# Patient Record
Sex: MALE | Race: WHITE | NOT HISPANIC OR LATINO | Employment: OTHER | URBAN - METROPOLITAN AREA
[De-identification: names, ages, dates, MRNs, and addresses within clinical notes are randomized per-mention and may not be internally consistent; named-entity substitution may affect disease eponyms.]

---

## 2023-01-30 PROBLEM — M19.049 OSTEOARTHRITIS, HAND: Status: ACTIVE | Noted: 2023-01-30

## 2023-01-30 PROBLEM — M25.569 PAIN IN JOINT, LOWER LEG: Status: ACTIVE | Noted: 2023-01-30

## 2023-01-30 PROBLEM — M19.039 OSTEOARTHRITIS OF WRIST: Status: ACTIVE | Noted: 2023-01-30

## 2023-01-30 PROBLEM — E78.2 COMBINED HYPERLIPIDEMIA: Status: ACTIVE | Noted: 2023-01-30

## 2023-01-30 PROBLEM — H93.13 TINNITUS OF BOTH EARS: Status: ACTIVE | Noted: 2023-01-30

## 2023-01-30 PROBLEM — R73.01 IMPAIRED FASTING GLUCOSE: Status: ACTIVE | Noted: 2023-01-30

## 2023-01-30 PROBLEM — R61 HYPERHIDROSIS: Status: ACTIVE | Noted: 2023-01-30

## 2023-01-30 PROBLEM — M47.816 DEGENERATIVE ARTHRITIS OF LUMBAR SPINE: Status: ACTIVE | Noted: 2023-01-30

## 2023-01-30 PROBLEM — M47.812 SPONDYLOSIS OF CERVICAL REGION WITHOUT MYELOPATHY OR RADICULOPATHY: Status: ACTIVE | Noted: 2023-01-30

## 2023-01-30 PROBLEM — M54.12 CERVICAL RADICULITIS: Status: ACTIVE | Noted: 2023-01-30

## 2023-01-30 PROBLEM — G89.29 BILATERAL CHRONIC KNEE PAIN: Status: ACTIVE | Noted: 2023-01-30

## 2023-01-30 PROBLEM — M25.561 BILATERAL CHRONIC KNEE PAIN: Status: ACTIVE | Noted: 2023-01-30

## 2023-01-30 PROBLEM — E53.8 B12 DEFICIENCY: Status: ACTIVE | Noted: 2023-01-30

## 2023-01-30 PROBLEM — H90.3 SENSORINEURAL HEARING LOSS, BILATERAL: Status: ACTIVE | Noted: 2023-01-30

## 2023-01-30 PROBLEM — M48.02 CERVICAL STENOSIS OF SPINE: Status: ACTIVE | Noted: 2023-01-30

## 2023-01-30 PROBLEM — M25.512 LEFT SHOULDER PAIN: Status: ACTIVE | Noted: 2023-01-30

## 2023-01-30 PROBLEM — M51.36 DISC DEGENERATION, LUMBAR: Status: ACTIVE | Noted: 2023-01-30

## 2023-01-30 PROBLEM — Z96.21 COCHLEAR IMPLANT STATUS: Status: ACTIVE | Noted: 2023-01-30

## 2023-01-30 PROBLEM — M96.1 LUMBAR POSTLAMINECTOMY SYNDROME: Status: ACTIVE | Noted: 2023-01-30

## 2023-01-30 PROBLEM — M54.50 LOW BACK PAIN: Status: ACTIVE | Noted: 2023-01-30

## 2023-01-30 PROBLEM — R53.1 WEAKNESS: Status: ACTIVE | Noted: 2023-01-30

## 2023-01-30 PROBLEM — R07.81 CHEST PAIN, PLEURITIC: Status: ACTIVE | Noted: 2023-01-30

## 2023-01-30 PROBLEM — H91.90 HEARING LOSS: Status: ACTIVE | Noted: 2023-01-30

## 2023-01-30 PROBLEM — K21.9 GERD (GASTROESOPHAGEAL REFLUX DISEASE): Status: ACTIVE | Noted: 2023-01-30

## 2023-01-30 PROBLEM — M25.562 BILATERAL CHRONIC KNEE PAIN: Status: ACTIVE | Noted: 2023-01-30

## 2023-01-30 PROBLEM — M75.42 IMPINGEMENT SYNDROME OF LEFT SHOULDER: Status: ACTIVE | Noted: 2023-01-30

## 2023-01-30 PROBLEM — R91.8 LUNG MASS: Status: ACTIVE | Noted: 2023-01-30

## 2023-01-30 PROBLEM — M79.18 MYOFASCIAL PAIN SYNDROME, CERVICAL: Status: ACTIVE | Noted: 2023-01-30

## 2023-01-30 PROBLEM — Z98.1 STATUS POST LUMBAR SPINAL FUSION: Status: ACTIVE | Noted: 2023-01-30

## 2023-01-30 PROBLEM — M51.369 DISC DEGENERATION, LUMBAR: Status: ACTIVE | Noted: 2023-01-30

## 2023-01-30 RX ORDER — ACETAMINOPHEN 500 MG
1 TABLET ORAL
COMMUNITY

## 2023-01-30 RX ORDER — ALUMINUM CHLORIDE 20 %
SOLUTION, NON-ORAL TOPICAL
COMMUNITY
Start: 2022-09-13 | End: 2024-02-08 | Stop reason: HOSPADM

## 2023-01-30 RX ORDER — OMEPRAZOLE 20 MG/1
1 CAPSULE, DELAYED RELEASE ORAL DAILY
COMMUNITY
Start: 2018-05-24 | End: 2023-05-08

## 2023-01-30 RX ORDER — ROSUVASTATIN CALCIUM 5 MG/1
1 TABLET, COATED ORAL DAILY
COMMUNITY
Start: 2020-10-27 | End: 2024-01-25

## 2023-01-30 RX ORDER — MULTIVIT-MIN/FA/LYCOPEN/LUTEIN .4-300-25
TABLET ORAL
COMMUNITY
Start: 2020-08-05

## 2023-01-30 RX ORDER — MELOXICAM 15 MG/1
1 TABLET ORAL DAILY
COMMUNITY
Start: 2020-06-01 | End: 2023-09-18 | Stop reason: SDUPTHER

## 2023-03-10 RX ORDER — OMEPRAZOLE 20 MG/1
1 TABLET, DELAYED RELEASE ORAL DAILY
COMMUNITY
End: 2023-10-24 | Stop reason: ALTCHOICE

## 2023-03-14 ENCOUNTER — OFFICE VISIT (OUTPATIENT)
Dept: PRIMARY CARE | Facility: CLINIC | Age: 73
End: 2023-03-14
Payer: MEDICARE

## 2023-03-14 ENCOUNTER — LAB (OUTPATIENT)
Dept: LAB | Facility: LAB | Age: 73
End: 2023-03-14
Payer: MEDICARE

## 2023-03-14 VITALS
DIASTOLIC BLOOD PRESSURE: 60 MMHG | SYSTOLIC BLOOD PRESSURE: 130 MMHG | HEART RATE: 77 BPM | BODY MASS INDEX: 24.88 KG/M2 | HEIGHT: 69 IN | WEIGHT: 168 LBS | OXYGEN SATURATION: 95 %

## 2023-03-14 DIAGNOSIS — E78.2 COMBINED HYPERLIPIDEMIA: Primary | ICD-10-CM

## 2023-03-14 DIAGNOSIS — Z12.5 SCREENING FOR MALIGNANT NEOPLASM OF PROSTATE: ICD-10-CM

## 2023-03-14 DIAGNOSIS — R73.01 IMPAIRED FASTING GLUCOSE: ICD-10-CM

## 2023-03-14 DIAGNOSIS — E78.2 COMBINED HYPERLIPIDEMIA: ICD-10-CM

## 2023-03-14 DIAGNOSIS — Z00.00 HEALTH MAINTENANCE EXAMINATION: ICD-10-CM

## 2023-03-14 LAB
ALANINE AMINOTRANSFERASE (SGPT) (U/L) IN SER/PLAS: 30 U/L (ref 10–52)
ALBUMIN (G/DL) IN SER/PLAS: 4.6 G/DL (ref 3.4–5)
ALKALINE PHOSPHATASE (U/L) IN SER/PLAS: 60 U/L (ref 33–136)
ANION GAP IN SER/PLAS: 13 MMOL/L (ref 10–20)
ASPARTATE AMINOTRANSFERASE (SGOT) (U/L) IN SER/PLAS: 24 U/L (ref 9–39)
BASOPHILS (10*3/UL) IN BLOOD BY AUTOMATED COUNT: 0.11 X10E9/L (ref 0–0.1)
BASOPHILS/100 LEUKOCYTES IN BLOOD BY AUTOMATED COUNT: 1.4 % (ref 0–2)
BILIRUBIN TOTAL (MG/DL) IN SER/PLAS: 0.9 MG/DL (ref 0–1.2)
CALCIUM (MG/DL) IN SER/PLAS: 9.5 MG/DL (ref 8.6–10.3)
CARBON DIOXIDE, TOTAL (MMOL/L) IN SER/PLAS: 29 MMOL/L (ref 21–32)
CHLORIDE (MMOL/L) IN SER/PLAS: 103 MMOL/L (ref 98–107)
CHOLESTEROL (MG/DL) IN SER/PLAS: 213 MG/DL (ref 0–199)
CHOLESTEROL IN HDL (MG/DL) IN SER/PLAS: 38.8 MG/DL
CHOLESTEROL/HDL RATIO: 5.5
CREATININE (MG/DL) IN SER/PLAS: 1.02 MG/DL (ref 0.5–1.3)
EOSINOPHILS (10*3/UL) IN BLOOD BY AUTOMATED COUNT: 0.49 X10E9/L (ref 0–0.4)
EOSINOPHILS/100 LEUKOCYTES IN BLOOD BY AUTOMATED COUNT: 6.1 % (ref 0–6)
ERYTHROCYTE DISTRIBUTION WIDTH (RATIO) BY AUTOMATED COUNT: 13.2 % (ref 11.5–14.5)
ERYTHROCYTE MEAN CORPUSCULAR HEMOGLOBIN CONCENTRATION (G/DL) BY AUTOMATED: 32.9 G/DL (ref 32–36)
ERYTHROCYTE MEAN CORPUSCULAR VOLUME (FL) BY AUTOMATED COUNT: 98 FL (ref 80–100)
ERYTHROCYTES (10*6/UL) IN BLOOD BY AUTOMATED COUNT: 5.05 X10E12/L (ref 4.5–5.9)
GFR MALE: 78 ML/MIN/1.73M2
GLUCOSE (MG/DL) IN SER/PLAS: 85 MG/DL (ref 74–99)
HEMATOCRIT (%) IN BLOOD BY AUTOMATED COUNT: 49.5 % (ref 41–52)
HEMOGLOBIN (G/DL) IN BLOOD: 16.3 G/DL (ref 13.5–17.5)
IMMATURE GRANULOCYTES/100 LEUKOCYTES IN BLOOD BY AUTOMATED COUNT: 0.4 % (ref 0–0.9)
LDL: 141 MG/DL (ref 0–99)
LEUKOCYTES (10*3/UL) IN BLOOD BY AUTOMATED COUNT: 8.1 X10E9/L (ref 4.4–11.3)
LYMPHOCYTES (10*3/UL) IN BLOOD BY AUTOMATED COUNT: 2.55 X10E9/L (ref 0.8–3)
LYMPHOCYTES/100 LEUKOCYTES IN BLOOD BY AUTOMATED COUNT: 31.6 % (ref 13–44)
MONOCYTES (10*3/UL) IN BLOOD BY AUTOMATED COUNT: 0.81 X10E9/L (ref 0.05–0.8)
MONOCYTES/100 LEUKOCYTES IN BLOOD BY AUTOMATED COUNT: 10 % (ref 2–10)
NEUTROPHILS (10*3/UL) IN BLOOD BY AUTOMATED COUNT: 4.08 X10E9/L (ref 1.6–5.5)
NEUTROPHILS/100 LEUKOCYTES IN BLOOD BY AUTOMATED COUNT: 50.5 % (ref 40–80)
PLATELETS (10*3/UL) IN BLOOD AUTOMATED COUNT: 337 X10E9/L (ref 150–450)
POTASSIUM (MMOL/L) IN SER/PLAS: 4.6 MMOL/L (ref 3.5–5.3)
PROSTATE SPECIFIC AG (NG/ML) IN SER/PLAS: 1.2 NG/ML (ref 0–4)
PROTEIN TOTAL: 7 G/DL (ref 6.4–8.2)
SODIUM (MMOL/L) IN SER/PLAS: 140 MMOL/L (ref 136–145)
TRIGLYCERIDE (MG/DL) IN SER/PLAS: 165 MG/DL (ref 0–149)
UREA NITROGEN (MG/DL) IN SER/PLAS: 23 MG/DL (ref 6–23)
VLDL: 33 MG/DL (ref 0–40)

## 2023-03-14 PROCEDURE — 80053 COMPREHEN METABOLIC PANEL: CPT

## 2023-03-14 PROCEDURE — 1160F RVW MEDS BY RX/DR IN RCRD: CPT | Performed by: FAMILY MEDICINE

## 2023-03-14 PROCEDURE — G0439 PPPS, SUBSEQ VISIT: HCPCS | Performed by: FAMILY MEDICINE

## 2023-03-14 PROCEDURE — 99214 OFFICE O/P EST MOD 30 MIN: CPT | Performed by: FAMILY MEDICINE

## 2023-03-14 PROCEDURE — 80061 LIPID PANEL: CPT

## 2023-03-14 PROCEDURE — 85025 COMPLETE CBC W/AUTO DIFF WBC: CPT

## 2023-03-14 PROCEDURE — 1159F MED LIST DOCD IN RCRD: CPT | Performed by: FAMILY MEDICINE

## 2023-03-14 PROCEDURE — 1170F FXNL STATUS ASSESSED: CPT | Performed by: FAMILY MEDICINE

## 2023-03-14 PROCEDURE — 1157F ADVNC CARE PLAN IN RCRD: CPT | Performed by: FAMILY MEDICINE

## 2023-03-14 PROCEDURE — 36415 COLL VENOUS BLD VENIPUNCTURE: CPT

## 2023-03-14 PROCEDURE — 1036F TOBACCO NON-USER: CPT | Performed by: FAMILY MEDICINE

## 2023-03-14 ASSESSMENT — ACTIVITIES OF DAILY LIVING (ADL)
BATHING: INDEPENDENT
DRESSING: INDEPENDENT
TAKING_MEDICATION: INDEPENDENT
MANAGING_FINANCES: INDEPENDENT
DOING_HOUSEWORK: INDEPENDENT
GROCERY_SHOPPING: INDEPENDENT

## 2023-03-14 ASSESSMENT — ENCOUNTER SYMPTOMS
UNEXPECTED WEIGHT CHANGE: 0
PALPITATIONS: 0
CONSTIPATION: 0
VOMITING: 0

## 2023-03-14 ASSESSMENT — PATIENT HEALTH QUESTIONNAIRE - PHQ9
SUM OF ALL RESPONSES TO PHQ9 QUESTIONS 1 AND 2: 0
1. LITTLE INTEREST OR PLEASURE IN DOING THINGS: NOT AT ALL
2. FEELING DOWN, DEPRESSED OR HOPELESS: NOT AT ALL

## 2023-03-14 NOTE — PROGRESS NOTES
"Subjective   Patient ID: Klever Moran is a 72 y.o. male who presents for Medicare Annual Wellness Visit Subsequent (Denies any concerns).    HPI   For follow-up of hyperlipidemia, able  Review of Systems   Constitutional:  Negative for unexpected weight change.   HENT:  Negative for congestion and ear discharge.    Cardiovascular:  Negative for chest pain and palpitations.   Gastrointestinal:  Negative for constipation and vomiting.   All other systems reviewed and are negative.  Last colonoscopy 2018, due again 2025, Dr Montana    Objective   /84   Pulse 77   Ht 1.746 m (5' 8.75\")   Wt 76.2 kg (168 lb)   SpO2 95%   BMI 24.99 kg/m²     Physical Exam  HENT:      Head: Normocephalic and atraumatic.      Nose: Nose normal.      Mouth/Throat:      Mouth: Mucous membranes are moist.      Pharynx: No oropharyngeal exudate.   Eyes:      Extraocular Movements: Extraocular movements intact.      Conjunctiva/sclera: Conjunctivae normal.      Pupils: Pupils are equal, round, and reactive to light.   Cardiovascular:      Rate and Rhythm: Normal rate and regular rhythm.   Pulmonary:      Effort: Pulmonary effort is normal.   Abdominal:      General: There is no distension.      Palpations: Abdomen is soft.   Musculoskeletal:      Cervical back: Normal range of motion and neck supple.   Lymphadenopathy:      Cervical: No cervical adenopathy.   Neurological:      General: No focal deficit present.      Mental Status: He is alert.   Psychiatric:         Attention and Perception: Attention normal.         Speech: Speech normal.         Behavior: Behavior is cooperative.         Assessment/Plan   Diagnoses and all orders for this visit:  Combined hyperlipidemia  -     Lipid Panel; Future  -     CBC and Auto Differential; Future  -     Comprehensive Metabolic Panel; Future  Impaired fasting glucose  Health maintenance examination  Screening for malignant neoplasm of prostate    PSA ordered, written, unable to put in " computer due to a glitch system

## 2023-03-15 ENCOUNTER — TELEPHONE (OUTPATIENT)
Dept: PRIMARY CARE | Facility: CLINIC | Age: 73
End: 2023-03-15
Payer: MEDICARE

## 2023-03-15 NOTE — TELEPHONE ENCOUNTER
Result Communication    Resulted Orders   Prostate Specific Antigen   Result Value Ref Range    PSA 1.20 0.00 - 4.00 ng/mL      Comment:      The FDA requires that the method used for PSA assay be   reported to the physician. Values obtained with different   assay methods must not be used interchangeably. This test   was performed at [facility] using the Siemens  Innometrix IncllA.P Avanashiappa Silk PSA method, which is a sandwich immunoassay using   chemiluminescence for quantitation. The assay is approved  for measurement of prostate-specific antigen (PSA) in   serum and may be used in conjunction with a digital rectal  examination in men 50 years and older as an aid in   detection of prostate cancer.   5-Alpha-reductase inhibitors (e.g. Proscar, Finasteride,   Avodart, Dutasteride and Brandy) for the treatment of BPH   have been shown to lower PSA levels by an average of 50%   after 6 months of treatment.         5:18 PM      Results were successfully communicated with the pts spouse and they acknowledged their understanding.

## 2023-03-16 ENCOUNTER — TELEPHONE (OUTPATIENT)
Dept: PRIMARY CARE | Facility: CLINIC | Age: 73
End: 2023-03-16
Payer: MEDICARE

## 2023-03-16 DIAGNOSIS — Z12.11 ENCOUNTER FOR SCREENING COLONOSCOPY: Primary | ICD-10-CM

## 2023-03-16 NOTE — PROGRESS NOTES
Pt. Called Dr. Low office to arrange a colonoscopy and they asked him to get verification from us. Reviewed with JU and will place a referral to Dr. Montana for colonoscopy.   Pt. Notified and will call her office tomorrow to arrange.

## 2023-03-16 NOTE — TELEPHONE ENCOUNTER
----- Message from Howard Wright MD sent at 3/14/2023  2:56 PM EDT -----  Labs look good except that cholesterol and triglycerides are up quite a bit from last time  Recommend low-fat low-cholesterol diet and stay active

## 2023-04-21 ENCOUNTER — HOSPITAL ENCOUNTER (OUTPATIENT)
Dept: DATA CONVERSION | Facility: HOSPITAL | Age: 73
End: 2023-04-21
Attending: SURGERY | Admitting: SURGERY
Payer: MEDICARE

## 2023-04-21 DIAGNOSIS — K21.9 GASTRO-ESOPHAGEAL REFLUX DISEASE WITHOUT ESOPHAGITIS: ICD-10-CM

## 2023-04-21 DIAGNOSIS — K63.3 ULCER OF INTESTINE: ICD-10-CM

## 2023-04-21 DIAGNOSIS — I10 ESSENTIAL (PRIMARY) HYPERTENSION: ICD-10-CM

## 2023-04-21 DIAGNOSIS — Z86.010 PERSONAL HISTORY OF COLONIC POLYPS: ICD-10-CM

## 2023-04-21 DIAGNOSIS — Z12.11 ENCOUNTER FOR SCREENING FOR MALIGNANT NEOPLASM OF COLON: ICD-10-CM

## 2023-05-03 LAB
COMPLETE PATHOLOGY REPORT: NORMAL
CONVERTED CLINICAL DIAGNOSIS-HISTORY: NORMAL
CONVERTED FINAL DIAGNOSIS: NORMAL
CONVERTED FINAL REPORT PDF LINK TO COPY AND PASTE: NORMAL
CONVERTED GROSS DESCRIPTION: NORMAL

## 2023-05-07 DIAGNOSIS — K21.9 GASTRO-ESOPHAGEAL REFLUX DISEASE WITHOUT ESOPHAGITIS: ICD-10-CM

## 2023-05-08 PROBLEM — Z86.0100 HISTORY OF COLON POLYPS: Status: ACTIVE | Noted: 2023-05-08

## 2023-05-08 PROBLEM — Z86.010 HISTORY OF COLON POLYPS: Status: ACTIVE | Noted: 2023-05-08

## 2023-05-08 RX ORDER — DIPHENHYDRAMINE HCL 25 MG
1 TABLET ORAL EVERY 4 HOURS PRN
COMMUNITY
End: 2023-10-24 | Stop reason: ALTCHOICE

## 2023-05-08 RX ORDER — OMEPRAZOLE 20 MG/1
CAPSULE, DELAYED RELEASE ORAL
Qty: 90 CAPSULE | Refills: 3 | Status: SHIPPED | OUTPATIENT
Start: 2023-05-08 | End: 2024-05-07

## 2023-05-08 RX ORDER — ACETAMINOPHEN AND CODEINE PHOSPHATE 300; 30 MG/1; MG/1
1 TABLET ORAL
COMMUNITY
Start: 2023-04-12 | End: 2023-10-24 | Stop reason: ALTCHOICE

## 2023-09-14 NOTE — H&P
History & Physical Reviewed:   I have reviewed the History and Physical dated:  13-Apr-2023   History and Physical reviewed and relevant findings noted. Patient examined to review pertinent physical  findings.: No significant changes   Home Medications Reviewed: no changes noted   Allergies Reviewed: no changes noted       ERAS (Enhanced Recovery After Surgery):  ·  ERAS Patient: no     Consent:   COVID-19 Consent:  ·  COVID-19 Risk Consent Surgeon has reviewed key risks related to the risk of luis fernando COVID-19 and if they contract COVID-19 what the risks are.       Electronic Signatures:  Gloria Montana)  (Signed 21-Apr-2023 10:02)   Authored: History & Physical Reviewed, ERAS, Consent,  Note Completion      Last Updated: 21-Apr-2023 10:02 by Gloria Montana)

## 2023-09-18 DIAGNOSIS — M25.569 ARTHRALGIA OF LOWER LEG, UNSPECIFIED LATERALITY: Primary | ICD-10-CM

## 2023-09-18 NOTE — TELEPHONE ENCOUNTER
MEDICATION REFILL    Pharmacy Name:  [facility]/   Medication requested  Meloxicam 15 mg  Dosage  1x daily  Quantity  90 days    Allergies     none given  Date of last visit    03/2023  Date of Next Appointment   10/16/2023    Pt is completely out of Rx.

## 2023-09-19 RX ORDER — MELOXICAM 15 MG/1
15 TABLET ORAL DAILY
Qty: 30 TABLET | Refills: 0 | Status: SHIPPED | OUTPATIENT
Start: 2023-09-19 | End: 2023-10-19

## 2023-09-21 ENCOUNTER — TELEPHONE (OUTPATIENT)
Dept: PRIMARY CARE | Facility: CLINIC | Age: 73
End: 2023-09-21
Payer: MEDICARE

## 2023-09-21 NOTE — TELEPHONE ENCOUNTER
Rx Refill Request Telephone Encounter      Pharmacy: [facility] [place]  Rx: Maloxican  Dose: 15mg once daily  Quantity: 90  Allergies: NKA  Last Office Visit: March 2023  Next Office Visit:   October 2023

## 2023-10-16 ENCOUNTER — APPOINTMENT (OUTPATIENT)
Dept: PRIMARY CARE | Facility: CLINIC | Age: 73
End: 2023-10-16
Payer: MEDICARE

## 2023-10-19 DIAGNOSIS — M25.569 ARTHRALGIA OF LOWER LEG, UNSPECIFIED LATERALITY: ICD-10-CM

## 2023-10-19 PROBLEM — C44.329 SQUAMOUS CELL CARCINOMA OF SKIN OF OTHER PARTS OF FACE: Status: ACTIVE | Noted: 2021-09-21

## 2023-10-19 PROBLEM — M19.012 PRIMARY OSTEOARTHRITIS, LEFT SHOULDER: Status: ACTIVE | Noted: 2023-10-19

## 2023-10-19 PROBLEM — C44.42 SQUAMOUS CELL CARCINOMA OF SKIN OF SCALP AND NECK: Status: ACTIVE | Noted: 2021-09-21

## 2023-10-19 RX ORDER — MELOXICAM 15 MG/1
15 TABLET ORAL DAILY
Qty: 30 TABLET | Refills: 0 | Status: SHIPPED | OUTPATIENT
Start: 2023-10-19 | End: 2023-10-24 | Stop reason: SDUPTHER

## 2023-10-24 ENCOUNTER — LAB (OUTPATIENT)
Dept: LAB | Facility: LAB | Age: 73
End: 2023-10-24
Payer: MEDICARE

## 2023-10-24 ENCOUNTER — OFFICE VISIT (OUTPATIENT)
Dept: PRIMARY CARE | Facility: CLINIC | Age: 73
End: 2023-10-24
Payer: MEDICARE

## 2023-10-24 VITALS
SYSTOLIC BLOOD PRESSURE: 130 MMHG | HEART RATE: 76 BPM | BODY MASS INDEX: 21.95 KG/M2 | DIASTOLIC BLOOD PRESSURE: 65 MMHG | OXYGEN SATURATION: 98 % | WEIGHT: 153 LBS

## 2023-10-24 DIAGNOSIS — M25.569 ARTHRALGIA OF LOWER LEG, UNSPECIFIED LATERALITY: ICD-10-CM

## 2023-10-24 DIAGNOSIS — Z23 NEED FOR PROPHYLACTIC VACCINATION AND INOCULATION AGAINST INFLUENZA: ICD-10-CM

## 2023-10-24 DIAGNOSIS — K21.9 GASTROESOPHAGEAL REFLUX DISEASE, UNSPECIFIED WHETHER ESOPHAGITIS PRESENT: ICD-10-CM

## 2023-10-24 DIAGNOSIS — E78.2 COMBINED HYPERLIPIDEMIA: ICD-10-CM

## 2023-10-24 DIAGNOSIS — E78.2 COMBINED HYPERLIPIDEMIA: Primary | ICD-10-CM

## 2023-10-24 LAB
ALBUMIN SERPL BCP-MCNC: 4.3 G/DL (ref 3.4–5)
ALP SERPL-CCNC: 61 U/L (ref 33–136)
ALT SERPL W P-5'-P-CCNC: 19 U/L (ref 10–52)
ANION GAP SERPL CALC-SCNC: 13 MMOL/L (ref 10–20)
AST SERPL W P-5'-P-CCNC: 20 U/L (ref 9–39)
BASOPHILS # BLD AUTO: 0.11 X10*3/UL (ref 0–0.1)
BASOPHILS NFR BLD AUTO: 1.8 %
BILIRUB SERPL-MCNC: 0.8 MG/DL (ref 0–1.2)
BUN SERPL-MCNC: 22 MG/DL (ref 6–23)
CALCIUM SERPL-MCNC: 8.8 MG/DL (ref 8.6–10.3)
CHLORIDE SERPL-SCNC: 105 MMOL/L (ref 98–107)
CHOLEST SERPL-MCNC: 164 MG/DL (ref 0–199)
CHOLESTEROL/HDL RATIO: 4.1
CO2 SERPL-SCNC: 27 MMOL/L (ref 21–32)
CREAT SERPL-MCNC: 0.9 MG/DL (ref 0.5–1.3)
EOSINOPHIL # BLD AUTO: 0.36 X10*3/UL (ref 0–0.4)
EOSINOPHIL NFR BLD AUTO: 5.9 %
ERYTHROCYTE [DISTWIDTH] IN BLOOD BY AUTOMATED COUNT: 13.3 % (ref 11.5–14.5)
GFR SERPL CREATININE-BSD FRML MDRD: 90 ML/MIN/1.73M*2
GLUCOSE SERPL-MCNC: 84 MG/DL (ref 74–99)
HCT VFR BLD AUTO: 46.3 % (ref 41–52)
HDLC SERPL-MCNC: 40.3 MG/DL
HGB BLD-MCNC: 14.8 G/DL (ref 13.5–17.5)
IMM GRANULOCYTES # BLD AUTO: 0.01 X10*3/UL (ref 0–0.5)
IMM GRANULOCYTES NFR BLD AUTO: 0.2 % (ref 0–0.9)
LDLC SERPL CALC-MCNC: 107 MG/DL
LYMPHOCYTES # BLD AUTO: 1.63 X10*3/UL (ref 0.8–3)
LYMPHOCYTES NFR BLD AUTO: 26.9 %
MCH RBC QN AUTO: 32.4 PG (ref 26–34)
MCHC RBC AUTO-ENTMCNC: 32 G/DL (ref 32–36)
MCV RBC AUTO: 101 FL (ref 80–100)
MONOCYTES # BLD AUTO: 0.47 X10*3/UL (ref 0.05–0.8)
MONOCYTES NFR BLD AUTO: 7.8 %
NEUTROPHILS # BLD AUTO: 3.48 X10*3/UL (ref 1.6–5.5)
NEUTROPHILS NFR BLD AUTO: 57.4 %
NON HDL CHOLESTEROL: 124 MG/DL (ref 0–149)
NRBC BLD-RTO: 0 /100 WBCS (ref 0–0)
PLATELET # BLD AUTO: 268 X10*3/UL (ref 150–450)
PMV BLD AUTO: 10.9 FL (ref 7.5–11.5)
POTASSIUM SERPL-SCNC: 4.9 MMOL/L (ref 3.5–5.3)
PROT SERPL-MCNC: 6.6 G/DL (ref 6.4–8.2)
RBC # BLD AUTO: 4.57 X10*6/UL (ref 4.5–5.9)
SODIUM SERPL-SCNC: 140 MMOL/L (ref 136–145)
TRIGL SERPL-MCNC: 86 MG/DL (ref 0–149)
VLDL: 17 MG/DL (ref 0–40)
WBC # BLD AUTO: 6.1 X10*3/UL (ref 4.4–11.3)

## 2023-10-24 PROCEDURE — 1160F RVW MEDS BY RX/DR IN RCRD: CPT | Performed by: FAMILY MEDICINE

## 2023-10-24 PROCEDURE — 80061 LIPID PANEL: CPT

## 2023-10-24 PROCEDURE — 1036F TOBACCO NON-USER: CPT | Performed by: FAMILY MEDICINE

## 2023-10-24 PROCEDURE — 1125F AMNT PAIN NOTED PAIN PRSNT: CPT | Performed by: FAMILY MEDICINE

## 2023-10-24 PROCEDURE — 1159F MED LIST DOCD IN RCRD: CPT | Performed by: FAMILY MEDICINE

## 2023-10-24 PROCEDURE — 36415 COLL VENOUS BLD VENIPUNCTURE: CPT

## 2023-10-24 PROCEDURE — G0008 ADMIN INFLUENZA VIRUS VAC: HCPCS | Performed by: FAMILY MEDICINE

## 2023-10-24 PROCEDURE — 80053 COMPREHEN METABOLIC PANEL: CPT

## 2023-10-24 PROCEDURE — 85025 COMPLETE CBC W/AUTO DIFF WBC: CPT

## 2023-10-24 PROCEDURE — 90662 IIV NO PRSV INCREASED AG IM: CPT | Performed by: FAMILY MEDICINE

## 2023-10-24 PROCEDURE — 99214 OFFICE O/P EST MOD 30 MIN: CPT | Performed by: FAMILY MEDICINE

## 2023-10-24 RX ORDER — MELOXICAM 15 MG/1
15 TABLET ORAL DAILY
Qty: 90 TABLET | Refills: 1 | Status: SHIPPED | OUTPATIENT
Start: 2023-10-24 | End: 2024-04-21

## 2023-10-24 ASSESSMENT — ENCOUNTER SYMPTOMS
CONSTIPATION: 0
VOMITING: 0
UNEXPECTED WEIGHT CHANGE: 0
PALPITATIONS: 0

## 2023-10-24 NOTE — PROGRESS NOTES
Doing well except for a lot of body aches all the time. Has been getting dizzy on standing doesn't last very long maybe a couple sec. Started last spring  Subjective   Patient ID: Klever Moran is a 73 y.o. male who presents for Follow-up.    HPI   Patient has hx of stable hypertension, hyperlipidemia.  Pt denies chest pain, shortness of breath and edema.  Patient's current treatment as listed in Rx.  Patient is compliant with treatment and complains of no side effects associated treatment.    Review of Systems   Constitutional:  Negative for unexpected weight change.   HENT:  Negative for congestion and ear discharge.    Cardiovascular:  Negative for chest pain and palpitations.   Gastrointestinal:  Negative for constipation and vomiting.   All other systems reviewed and are negative.      Objective   /65   Pulse 76   Wt 69.4 kg (153 lb)   SpO2 98%   BMI 21.95 kg/m²     Physical Exam  HENT:      Head: Normocephalic and atraumatic.      Nose: Nose normal.      Mouth/Throat:      Mouth: Mucous membranes are moist.      Pharynx: No oropharyngeal exudate.   Eyes:      Extraocular Movements: Extraocular movements intact.      Conjunctiva/sclera: Conjunctivae normal.      Pupils: Pupils are equal, round, and reactive to light.   Cardiovascular:      Rate and Rhythm: Normal rate and regular rhythm.   Pulmonary:      Effort: Pulmonary effort is normal.   Abdominal:      General: There is no distension.      Palpations: Abdomen is soft.   Musculoskeletal:      Cervical back: Normal range of motion and neck supple.   Lymphadenopathy:      Cervical: No cervical adenopathy.   Neurological:      General: No focal deficit present.      Mental Status: He is alert.   Psychiatric:         Attention and Perception: Attention normal.         Speech: Speech normal.         Behavior: Behavior is cooperative.         Assessment/Plan   Diagnoses and all orders for this visit:  Combined hyperlipidemia  Comments:  Continue  statin  Reviewed labs and recommend increasing to 10 or 20 mg daily but patient defers  Diet exercise weight discussed  Orders:  -     Lipid Panel; Future  -     CBC and Auto Differential; Future  -     Comprehensive Metabolic Panel; Future  Need for prophylactic vaccination and inoculation against influenza  -     Flu vaccine, quadrivalent, high-dose, preservative free, age 65y+ (FLUZONE)  -     Lipid Panel; Future  -     CBC and Auto Differential; Future  -     Comprehensive Metabolic Panel; Future  Gastroesophageal reflux disease, unspecified whether esophagitis present  Comments:  Controlled  Orders:  -     Lipid Panel; Future  -     CBC and Auto Differential; Future  -     Comprehensive Metabolic Panel; Future  Arthralgia of lower leg, unspecified laterality  Comments:  Lifestyle modification discussed  Orders:  -     meloxicam (Mobic) 15 mg tablet; Take 1 tablet (15 mg) by mouth once daily.  HM LM discussed  Check labs  Recheck 6 months sooner if any issues arise

## 2023-12-19 ENCOUNTER — TELEPHONE (OUTPATIENT)
Dept: PRIMARY CARE | Facility: CLINIC | Age: 73
End: 2023-12-19

## 2023-12-19 ENCOUNTER — OFFICE VISIT (OUTPATIENT)
Dept: ORTHOPEDIC SURGERY | Facility: CLINIC | Age: 73
End: 2023-12-19
Payer: MEDICARE

## 2023-12-19 DIAGNOSIS — Z01.818 PREOP EXAMINATION: ICD-10-CM

## 2023-12-19 DIAGNOSIS — M19.012 OSTEOARTHRITIS OF LEFT SHOULDER, UNSPECIFIED OSTEOARTHRITIS TYPE: Primary | ICD-10-CM

## 2023-12-19 PROCEDURE — 1125F AMNT PAIN NOTED PAIN PRSNT: CPT | Performed by: ORTHOPAEDIC SURGERY

## 2023-12-19 PROCEDURE — 1159F MED LIST DOCD IN RCRD: CPT | Performed by: ORTHOPAEDIC SURGERY

## 2023-12-19 PROCEDURE — 20611 DRAIN/INJ JOINT/BURSA W/US: CPT | Performed by: ORTHOPAEDIC SURGERY

## 2023-12-19 PROCEDURE — 1160F RVW MEDS BY RX/DR IN RCRD: CPT | Performed by: ORTHOPAEDIC SURGERY

## 2023-12-19 PROCEDURE — 1036F TOBACCO NON-USER: CPT | Performed by: ORTHOPAEDIC SURGERY

## 2023-12-19 PROCEDURE — 99213 OFFICE O/P EST LOW 20 MIN: CPT | Performed by: ORTHOPAEDIC SURGERY

## 2023-12-19 PROCEDURE — 2500000004 HC RX 250 GENERAL PHARMACY W/ HCPCS (ALT 636 FOR OP/ED): Performed by: ORTHOPAEDIC SURGERY

## 2023-12-19 PROCEDURE — 2500000005 HC RX 250 GENERAL PHARMACY W/O HCPCS: Performed by: ORTHOPAEDIC SURGERY

## 2023-12-19 RX ORDER — SODIUM CHLORIDE, SODIUM LACTATE, POTASSIUM CHLORIDE, CALCIUM CHLORIDE 600; 310; 30; 20 MG/100ML; MG/100ML; MG/100ML; MG/100ML
100 INJECTION, SOLUTION INTRAVENOUS CONTINUOUS
Status: CANCELLED | OUTPATIENT
Start: 2023-12-19

## 2023-12-19 RX ORDER — LIDOCAINE HYDROCHLORIDE 10 MG/ML
0.5 INJECTION INFILTRATION; PERINEURAL
Status: COMPLETED | OUTPATIENT
Start: 2023-12-19 | End: 2023-12-19

## 2023-12-19 RX ADMIN — TRIAMCINOLONE ACETONIDE 40 MG: 10 INJECTION, SUSPENSION INTRA-ARTICULAR; INTRALESIONAL at 10:09

## 2023-12-19 RX ADMIN — LIDOCAINE HYDROCHLORIDE 0.5 ML: 10 INJECTION, SOLUTION INFILTRATION; PERINEURAL at 10:09

## 2023-12-19 ASSESSMENT — ENCOUNTER SYMPTOMS
FATIGUE: 0
CHILLS: 0
WOUND: 0
FEVER: 0
WHEEZING: 0
TROUBLE SWALLOWING: 0
SHORTNESS OF BREATH: 0

## 2023-12-19 ASSESSMENT — PAIN SCALES - GENERAL: PAINLEVEL_OUTOF10: 7

## 2023-12-19 ASSESSMENT — PAIN - FUNCTIONAL ASSESSMENT: PAIN_FUNCTIONAL_ASSESSMENT: 0-10

## 2023-12-19 NOTE — PROGRESS NOTES
Reason for Appointment  Chief Complaint   Patient presents with    Right Shoulder - Injections    Left Shoulder - Pain     History of Present Illness  Patient is a 73 y.o. male here today for follow-up evaluation of continued left shoulder pain.  He has severe left shoulder rotator cuff arthritis.  He has had multiple injections in the past that have only given him short-term relief.  He is still very active and is having significant difficulties doing even simple daily activities due to pain and dysfunction in the shoulder.  His right shoulder is having more issues as well as he is having to use the right shoulder more.  Other changes in his past medical history, allergies, or medications.    Past Medical History:   Diagnosis Date    Encounter for screening for cardiovascular disorders 10/08/2019    Screening for abdominal aortic aneurysm    Encounter for screening for cardiovascular disorders 02/02/2021    Screening, heart disease, ischemic    Injury of conjunctiva and corneal abrasion without foreign body, left eye, initial encounter 04/16/2021    Abrasion of left cornea, initial encounter    Occipital neuralgia 09/12/2018    Occipital neuralgia of right side    Other chest pain 04/16/2021    Chest pain, atypical    Other conditions influencing health status     Adverse Reaction To Anesthesia    Other malaise 04/16/2021    Malaise and fatigue    Personal history of other diseases of the musculoskeletal system and connective tissue 04/16/2021    History of neck pain    Personal history of other diseases of the musculoskeletal system and connective tissue 06/01/2020    History of stiff neck    Personal history of other diseases of the nervous system and sense organs     History of hearing loss    Personal history of other specified conditions 06/05/2017    History of paresthesia    Personal history of other specified conditions 06/24/2020    History of weakness    Radiculopathy, lumbar region 09/10/2020    Lumbar  radiculitis    Superficial foreign body of unspecified hand, initial encounter 04/16/2021    Acute foreign body of hand    Unspecified hearing loss, right ear 04/16/2021    Hearing loss of right ear, unspecified hearing loss type       Past Surgical History:   Procedure Laterality Date    COCHLEAR IMPLANT  11/11/2022    HAND SURGERY  07/08/2013    Hand Surgery                                                                                                                                                          HERNIA REPAIR  06/25/2013    Hernia Repair    KNEE SURGERY  06/25/2013    Knee Surgery Right       Medication Documentation Review Audit       Reviewed by Kathy Jamison PA-C (Physician Assistant) on 12/19/23 at 1000      Medication Order Taking? Sig Documenting Provider Last Dose Status   acetaminophen (Tylenol) 500 mg tablet [id number] Yes Take 1 tablet (500 mg) by mouth. Repeat every 4 to 6 hours as needed Historical Provider, MD Taking Active   aluminum chloride (Drysol Dab-O-Matic) 20 % external solution [id number] Yes APPLY TO AFFECTED AREA DAILY AS NEEDED TO CONTROL SWEATING. Historical Provider, MD Taking Active   meloxicam (Mobic) 15 mg tablet [id number] Yes Take 1 tablet (15 mg) by mouth once daily. Howard Wright MD Taking Active   multivitamin-iron-minerals-folic acid (Centrum Silver) 0.4 mg-300 mcg- 250 mcg tablet [id number] Yes Centrum Silver Oral Tablet   Refills: 0        Start : 5-Aug-2020   Active Historical Provider, MD Taking Active   omeprazole (PriLOSEC) 20 mg DR capsule [id number] Yes TAKE 1 CAPSULE BY MOUTH EVERY DAY Howard Wright MD Taking Active   rosuvastatin (Crestor) 5 mg tablet [id number] Yes Take 1 tablet (5 mg) by mouth once daily. Historical Provider, MD Taking Active                    No Known Allergies    Review of Systems   Constitutional:  Negative for chills, fatigue and fever.   HENT:  Negative for postnasal drip and trouble swallowing.    Respiratory:  Negative for shortness  of breath and wheezing.    Cardiovascular:  Negative for chest pain and leg swelling.   Skin:  Negative for rash and wound.     Exam   On exam the left shoulder shows 100 degrees of active forward flexion, he has 140 degrees of active forward flexion on the right shoulder.  He has severe weakness with resisted external rotation on the left side, not as much  Assessment   Encounter Diagnoses   Name Primary?    Osteoarthritis of left shoulder, unspecified osteoarthritis type Yes    Preop examination        Plan   We discussed operative treatment today for the left shoulder, he has had multiple injections in with severe rotator cuff arthritis, his best chance for long-term function and outcome would be a reverse shoulder replacement.  He understands the lengthy recovery time needed and postoperative protocol fully.  He understands the risk of surgery including nerve, artery, tendon damage, infection, continued pain, need for future surgery.  He will call and schedule a left reverse shoulder replacement.  He would like a right shoulder injection today.  We sterilely injected under ultrasound guidance Kenalog and lidocaine in the right shoulder subacromial space.  Patient understands the small risk of infection and the signs look for as well as flare reaction.  Hopefully this gives him significant relief.    L Inj/Asp: R subacromial bursa on 12/19/2023 10:09 AM  Indications: pain  Details: 22 G needle, ultrasound-guided  Medications: 0.5 mL lidocaine 10 mg/mL (1 %); 40 mg triamcinolone acetonide 10 mg/mL  Outcome: tolerated well, no immediate complications    After discussing the risks and benefits of the procedure with proceeded with an injection.  Using ultrasound guidance we identified the acromion, humeral head and the subacromial bursa, images obtained. We then sterilely injected the right subacrominal space with a mixture of 40 mg of Kenalog and 2 cc of 1 % lidocaine. Pt tolerated the procedure well without any  adverse reactions.   Procedure, treatment alternatives, risks and benefits explained, specific risks discussed. Consent was given by the patient. Immediately prior to procedure a time out was called to verify the correct patient, procedure, equipment, support staff and site/side marked as required. Patient was prepped and draped in the usual sterile fashion.       Written by Kathy Blanco saw, evaluated, and treated the patient with the PA

## 2023-12-19 NOTE — TELEPHONE ENCOUNTER
Pt called in stating he has always taken rosuvastatin 5 mg but just realized he has rosuvastatin calcium 5 mg. Pt is wondering if the meds are the same or if he has the wrong medication. Please advise

## 2023-12-21 ENCOUNTER — HOSPITAL ENCOUNTER (OUTPATIENT)
Dept: RADIOLOGY | Facility: HOSPITAL | Age: 73
Discharge: HOME | End: 2023-12-21
Payer: MEDICARE

## 2023-12-21 DIAGNOSIS — M19.012 OSTEOARTHRITIS OF LEFT SHOULDER, UNSPECIFIED OSTEOARTHRITIS TYPE: ICD-10-CM

## 2023-12-21 DIAGNOSIS — Z01.818 PREOP EXAMINATION: ICD-10-CM

## 2023-12-21 PROCEDURE — 73200 CT UPPER EXTREMITY W/O DYE: CPT | Mod: LT

## 2023-12-21 PROCEDURE — 73030 X-RAY EXAM OF SHOULDER: CPT | Mod: LT

## 2024-01-09 ENCOUNTER — ANCILLARY PROCEDURE (OUTPATIENT)
Dept: RADIOLOGY | Facility: CLINIC | Age: 74
End: 2024-01-09
Payer: MEDICARE

## 2024-01-09 ENCOUNTER — FOLLOW-UP (OUTPATIENT)
Dept: NEUROSURGERY | Facility: CLINIC | Age: 74
End: 2024-01-09
Payer: MEDICARE

## 2024-01-09 VITALS
OXYGEN SATURATION: 98 % | TEMPERATURE: 97.4 F | WEIGHT: 161 LBS | BODY MASS INDEX: 23.05 KG/M2 | HEIGHT: 70 IN | SYSTOLIC BLOOD PRESSURE: 158 MMHG | RESPIRATION RATE: 18 BRPM | DIASTOLIC BLOOD PRESSURE: 87 MMHG | HEART RATE: 99 BPM

## 2024-01-09 DIAGNOSIS — G89.29 CHRONIC MIDLINE THORACIC BACK PAIN: ICD-10-CM

## 2024-01-09 DIAGNOSIS — M54.50 LUMBAR PAIN: ICD-10-CM

## 2024-01-09 DIAGNOSIS — M54.6 CHRONIC MIDLINE THORACIC BACK PAIN: ICD-10-CM

## 2024-01-09 DIAGNOSIS — M54.50 LUMBAR PAIN: Primary | ICD-10-CM

## 2024-01-09 PROCEDURE — 72100 X-RAY EXAM L-S SPINE 2/3 VWS: CPT

## 2024-01-09 PROCEDURE — 99213 OFFICE O/P EST LOW 20 MIN: CPT | Performed by: NURSE PRACTITIONER

## 2024-01-09 PROCEDURE — 72070 X-RAY EXAM THORAC SPINE 2VWS: CPT

## 2024-01-09 PROCEDURE — 99203 OFFICE O/P NEW LOW 30 MIN: CPT | Performed by: NURSE PRACTITIONER

## 2024-01-09 RX ORDER — AMOXICILLIN 500 MG/1
500 CAPSULE ORAL
COMMUNITY
End: 2024-01-22 | Stop reason: WASHOUT

## 2024-01-09 RX ORDER — CYCLOBENZAPRINE HCL 10 MG
10 TABLET ORAL 3 TIMES DAILY PRN
Qty: 90 TABLET | Refills: 0 | Status: SHIPPED | OUTPATIENT
Start: 2024-01-09 | End: 2024-04-29 | Stop reason: ALTCHOICE

## 2024-01-09 NOTE — PATIENT INSTRUCTIONS
I recommend conservative medical management at this time.  This includes over-the-counter Tylenol and naproxen sodium twice a day, topical pain relief medications.    I prescribed flexeril for pain as needed.  We will get spine x-rays today, I will call you if there is anything concerning.  Results should be available on [facility].  Please return for follow-up visit if your symptoms do not improve with this conservative management or in fact they worsen.  Ayesha Palmer, APRN-CNP

## 2024-01-09 NOTE — PROGRESS NOTES
Chief Complaint:   Klever Moran is a 73 y.o. year old man here for evaluation after sustaining a traumatic fall 10 days ago.    HPI  Mr Moran is a very nice 73 year old man wit Hx of lumbar fusion in 2015 and C3-7 ACDF in 2021 who now presents after experiencing a traumatic fall down several stairs off his deck 10 days ago.  He reports he landed flat on his back on the steps, he needed assistance to get up after several minutes she was able to walk.  He did not lose consciousness or hit his head.  Gait within normal limits he now still has thoracic paraspinal muscle soreness left shoulder soreness, lumbar paravertebral tenderness and pain.  He denies neck pain, no numbness or tingling or weakness in extremities.  Overall very sore with movement but able to walk.  Has trouble sleeping at night due to back pain.  Takes Tylenol and occasionally meloxicam with moderate relief.    Review of Systems  All other systems reviewed and negative other than what is already stated in this note.      Past Medical History:   Diagnosis Date    Encounter for screening for cardiovascular disorders 10/08/2019    Screening for abdominal aortic aneurysm    Encounter for screening for cardiovascular disorders 02/02/2021    Screening, heart disease, ischemic    Injury of conjunctiva and corneal abrasion without foreign body, left eye, initial encounter 04/16/2021    Abrasion of left cornea, initial encounter    Occipital neuralgia 09/12/2018    Occipital neuralgia of right side    Other chest pain 04/16/2021    Chest pain, atypical    Other conditions influencing health status     Adverse Reaction To Anesthesia    Other malaise 04/16/2021    Malaise and fatigue    Personal history of other diseases of the musculoskeletal system and connective tissue 04/16/2021    History of neck pain    Personal history of other diseases of the musculoskeletal system and connective tissue 06/01/2020    History of stiff neck    Personal history of  other diseases of the nervous system and sense organs     History of hearing loss    Personal history of other specified conditions 06/05/2017    History of paresthesia    Personal history of other specified conditions 06/24/2020    History of weakness    Radiculopathy, lumbar region 09/10/2020    Lumbar radiculitis    Superficial foreign body of unspecified hand, initial encounter 04/16/2021    Acute foreign body of hand    Unspecified hearing loss, right ear 04/16/2021    Hearing loss of right ear, unspecified hearing loss type       Patient Active Problem List   Diagnosis    B12 deficiency    Cervical radiculitis    Cervical stenosis of spine    Chest pain, pleuritic    Pain in joint, lower leg    Combined hyperlipidemia    Degenerative arthritis of lumbar spine    Disc degeneration, lumbar    GERD (gastroesophageal reflux disease)    Hearing loss    Hyperhidrosis    Impaired fasting glucose    Impingement syndrome of left shoulder    Left shoulder pain    Low back pain    Lumbar postlaminectomy syndrome    Lung mass    Myofascial pain syndrome, cervical    Osteoarthritis of wrist    Osteoarthritis, hand    Sensorineural hearing loss, bilateral    Spondylosis of cervical region without myelopathy or radiculopathy    Status post lumbar spinal fusion    Tinnitus of both ears    Weakness    Bilateral chronic knee pain    Cochlear implant status    Health maintenance examination    History of colon polyps    Primary osteoarthritis, left shoulder    Squamous cell carcinoma of skin of other parts of face    Squamous cell carcinoma of skin of scalp and neck    Osteoarthritis of left shoulder       Past Surgical History:   Procedure Laterality Date    COCHLEAR IMPLANT  11/11/2022    HAND SURGERY  07/08/2013    Hand Surgery                                                                                                                                                          HERNIA REPAIR  06/25/2013    Hernia Repair     KNEE SURGERY  06/25/2013    Knee Surgery Right       Family History   Problem Relation Name Age of Onset    No Known Problems Mother         Social History     Tobacco Use    Smoking status: Never    Smokeless tobacco: Never   Vaping Use    Vaping Use: Never used   Substance Use Topics    Alcohol use: Not Currently    Drug use: Never         Current Outpatient Medications:     acetaminophen (Tylenol) 500 mg tablet, Take 1 tablet (500 mg) by mouth. Repeat every 4 to 6 hours as needed, Disp: , Rfl:     aluminum chloride (Drysol Dab-O-Matic) 20 % external solution, APPLY TO AFFECTED AREA DAILY AS NEEDED TO CONTROL SWEATING., Disp: , Rfl:     amoxicillin (Amoxil) 500 mg capsule, Take 1 capsule (500 mg) by mouth., Disp: , Rfl:     meloxicam (Mobic) 15 mg tablet, Take 1 tablet (15 mg) by mouth once daily., Disp: 90 tablet, Rfl: 1    multivitamin-iron-minerals-folic acid (Centrum Silver) 0.4 mg-300 mcg- 250 mcg tablet, Centrum Silver Oral Tablet  Refills: 0      Start : 5-Aug-2020  Active, Disp: , Rfl:     omeprazole (PriLOSEC) 20 mg DR capsule, TAKE 1 CAPSULE BY MOUTH EVERY DAY, Disp: 90 capsule, Rfl: 3    rosuvastatin (Crestor) 5 mg tablet, Take 1 tablet (5 mg) by mouth once daily., Disp: , Rfl:     cyclobenzaprine (Flexeril) 10 mg tablet, Take 1 tablet (10 mg) by mouth 3 times a day as needed for muscle spasms., Disp: 90 tablet, Rfl: 0        Objective   Vitals:    01/09/24 0935   BP: 158/87   Pulse: 99   Resp: 18   Temp: 36.3 °C (97.4 °F)   SpO2: 98%       Exam  no acute distress, well developed man appearing his  stated age  normal sclera  moist mucus membranes  no peripheral edema   symmetric chest rise  nondistended abdomen  alert and oriented, pupils equal and round, extraocular movements intact, full strength in all extremities, normal sensation to light touch throughout, normal symmetric reflexes,   Vertebral muscle soreness in thoracic and lumbar spine as well as medial to left shoulder blade and and right  hip.  normal mood      Assessment/Plan   The primary encounter diagnosis was Lumbar pain. A diagnosis of Chronic midline thoracic back pain was also pertinent to this visit. Secondary to traumatic mechanical fall.  Mr. Moran continues to have paravertebral muscle soreness in the thoracic and lumbar spine after sustaining this fall 10 days ago, however he does not have any neurologic symptoms.exam within normal limits.  Recommendations are for conservative home management of muscle ache and strain with OTC NSAIDs, Tylenol, heat/cold locally, topical analgesic pain preparations.  Getting x-rays of thorax thoracic and lumbar spine to ensure lumbar hardware is intact and no fractures.        Ayesha Palmer, APRN-CNP      This note was created in part after personal review of documents in EMR including recent labs and available radiologic imaging. Total time spent in review of EMR, relevant imaging, time with patient and completion of this document is 30 minutes.

## 2024-01-22 PROBLEM — K63.3 ULCERATION OF INTESTINE: Status: ACTIVE | Noted: 2023-04-21

## 2024-01-22 PROBLEM — I10 PRIMARY HYPERTENSION: Status: ACTIVE | Noted: 2023-04-21

## 2024-01-22 PROBLEM — S05.02XA ABRASION OF LEFT CORNEA: Status: ACTIVE | Noted: 2024-01-22

## 2024-01-22 PROBLEM — M75.40 IMPINGEMENT SYNDROME OF SHOULDER REGION: Status: ACTIVE | Noted: 2024-01-22

## 2024-01-22 PROBLEM — Z86.69 HISTORY OF HEARING LOSS: Status: ACTIVE | Noted: 2024-01-22

## 2024-01-22 PROBLEM — Z98.890 POSTOPERATIVE STATE: Status: ACTIVE | Noted: 2024-01-22

## 2024-01-22 PROBLEM — R20.2 PARESTHESIA: Status: ACTIVE | Noted: 2024-01-22

## 2024-01-22 PROBLEM — Z86.39 HISTORY OF METABOLIC DISORDER: Status: ACTIVE | Noted: 2024-01-22

## 2024-01-22 RX ORDER — CALCIUM CARBONATE 600 MG
TABLET ORAL
COMMUNITY
End: 2024-04-29 | Stop reason: ALTCHOICE

## 2024-01-24 ENCOUNTER — PRE-ADMISSION TESTING (OUTPATIENT)
Dept: PREADMISSION TESTING | Facility: HOSPITAL | Age: 74
End: 2024-01-24
Payer: MEDICARE

## 2024-01-24 VITALS
SYSTOLIC BLOOD PRESSURE: 173 MMHG | HEIGHT: 70 IN | RESPIRATION RATE: 18 BRPM | HEART RATE: 97 BPM | DIASTOLIC BLOOD PRESSURE: 93 MMHG | BODY MASS INDEX: 23.19 KG/M2 | TEMPERATURE: 97 F | OXYGEN SATURATION: 100 % | WEIGHT: 162 LBS

## 2024-01-24 DIAGNOSIS — Z01.818 PRE-OP TESTING: Primary | ICD-10-CM

## 2024-01-24 LAB
ANION GAP SERPL CALC-SCNC: 9 MMOL/L
APPEARANCE UR: CLEAR
BASOPHILS # BLD AUTO: 0.09 X10*3/UL (ref 0–0.1)
BASOPHILS NFR BLD AUTO: 1.5 %
BILIRUB UR STRIP.AUTO-MCNC: NEGATIVE MG/DL
BUN SERPL-MCNC: 17 MG/DL (ref 8–25)
CALCIUM SERPL-MCNC: 9.5 MG/DL (ref 8.5–10.4)
CHLORIDE SERPL-SCNC: 104 MMOL/L (ref 97–107)
CO2 SERPL-SCNC: 26 MMOL/L (ref 24–31)
COLOR UR: COLORLESS
CREAT SERPL-MCNC: 1 MG/DL (ref 0.4–1.6)
EGFRCR SERPLBLD CKD-EPI 2021: 79 ML/MIN/1.73M*2
EOSINOPHIL # BLD AUTO: 0.41 X10*3/UL (ref 0–0.4)
EOSINOPHIL NFR BLD AUTO: 6.7 %
ERYTHROCYTE [DISTWIDTH] IN BLOOD BY AUTOMATED COUNT: 12.8 % (ref 11.5–14.5)
GLUCOSE SERPL-MCNC: 90 MG/DL (ref 65–99)
GLUCOSE UR STRIP.AUTO-MCNC: NORMAL MG/DL
HCT VFR BLD AUTO: 46 % (ref 41–52)
HGB BLD-MCNC: 15.4 G/DL (ref 13.5–17.5)
IMM GRANULOCYTES # BLD AUTO: 0.01 X10*3/UL (ref 0–0.5)
IMM GRANULOCYTES NFR BLD AUTO: 0.2 % (ref 0–0.9)
KETONES UR STRIP.AUTO-MCNC: NEGATIVE MG/DL
LEUKOCYTE ESTERASE UR QL STRIP.AUTO: NEGATIVE
LYMPHOCYTES # BLD AUTO: 1.42 X10*3/UL (ref 0.8–3)
LYMPHOCYTES NFR BLD AUTO: 23.4 %
MCH RBC QN AUTO: 33 PG (ref 26–34)
MCHC RBC AUTO-ENTMCNC: 33.5 G/DL (ref 32–36)
MCV RBC AUTO: 99 FL (ref 80–100)
MONOCYTES # BLD AUTO: 0.6 X10*3/UL (ref 0.05–0.8)
MONOCYTES NFR BLD AUTO: 9.9 %
NEUTROPHILS # BLD AUTO: 3.55 X10*3/UL (ref 1.6–5.5)
NEUTROPHILS NFR BLD AUTO: 58.3 %
NITRITE UR QL STRIP.AUTO: NEGATIVE
NRBC BLD-RTO: 0 /100 WBCS (ref 0–0)
PH UR STRIP.AUTO: 6 [PH]
PLATELET # BLD AUTO: 272 X10*3/UL (ref 150–450)
POTASSIUM SERPL-SCNC: 4.4 MMOL/L (ref 3.4–5.1)
PROT UR STRIP.AUTO-MCNC: NEGATIVE MG/DL
RBC # BLD AUTO: 4.67 X10*6/UL (ref 4.5–5.9)
RBC # UR STRIP.AUTO: NEGATIVE /UL
SODIUM SERPL-SCNC: 139 MMOL/L (ref 133–145)
SP GR UR STRIP.AUTO: 1
UROBILINOGEN UR STRIP.AUTO-MCNC: NORMAL MG/DL
WBC # BLD AUTO: 6.1 X10*3/UL (ref 4.4–11.3)

## 2024-01-24 PROCEDURE — 85025 COMPLETE CBC W/AUTO DIFF WBC: CPT | Performed by: PHYSICIAN ASSISTANT

## 2024-01-24 PROCEDURE — 93005 ELECTROCARDIOGRAM TRACING: CPT

## 2024-01-24 PROCEDURE — 80048 BASIC METABOLIC PNL TOTAL CA: CPT | Performed by: PHYSICIAN ASSISTANT

## 2024-01-24 PROCEDURE — 93010 ELECTROCARDIOGRAM REPORT: CPT | Performed by: INTERNAL MEDICINE

## 2024-01-24 PROCEDURE — 81003 URINALYSIS AUTO W/O SCOPE: CPT | Performed by: PHYSICIAN ASSISTANT

## 2024-01-24 PROCEDURE — 36415 COLL VENOUS BLD VENIPUNCTURE: CPT

## 2024-01-24 PROCEDURE — 87081 CULTURE SCREEN ONLY: CPT | Mod: TRILAB,WESLAB | Performed by: PHYSICIAN ASSISTANT

## 2024-01-24 PROCEDURE — 99204 OFFICE O/P NEW MOD 45 MIN: CPT | Performed by: PHYSICIAN ASSISTANT

## 2024-01-24 RX ORDER — DICLOFENAC SODIUM 10 MG/G
4 GEL TOPICAL 4 TIMES DAILY PRN
COMMUNITY
End: 2024-04-29 | Stop reason: ALTCHOICE

## 2024-01-24 RX ORDER — CHLORHEXIDINE GLUCONATE ORAL RINSE 1.2 MG/ML
SOLUTION DENTAL
Qty: 473 ML | Refills: 0 | Status: SHIPPED | OUTPATIENT
Start: 2024-02-06 | End: 2024-02-08 | Stop reason: HOSPADM

## 2024-01-24 ASSESSMENT — DUKE ACTIVITY SCORE INDEX (DASI)
CAN YOU PARTICIPATE IN STRENOUS SPORTS LIKE SWIMMING, SINGLES TENNIS, FOOTBALL, BASKETBALL, OR SKIING: YES
TOTAL_SCORE: 58.2
CAN YOU WALK A BLOCK OR TWO ON LEVEL GROUND: YES
DASI METS SCORE: 9.9
CAN YOU RUN A SHORT DISTANCE: YES
CAN YOU PARTICIPATE IN MODERATE RECREATIONAL ACTIVITIES LIKE GOLF, BOWLING, DANCING, DOUBLES TENNIS OR THROWING A BASEBALL OR FOOTBALL: YES
CAN YOU WALK INDOORS, SUCH AS AROUND YOUR HOUSE: YES
CAN YOU DO YARD WORK LIKE RAKING LEAVES, WEEDING OR PUSHING A MOWER: YES
CAN YOU DO MODERATE WORK AROUND THE HOUSE LIKE VACUUMING, SWEEPING FLOORS OR CARRYING GROCERIES: YES
CAN YOU DO LIGHT WORK AROUND THE HOUSE LIKE DUSTING OR WASHING DISHES: YES
CAN YOU CLIMB A FLIGHT OF STAIRS OR WALK UP A HILL: YES
CAN YOU DO HEAVY WORK AROUND THE HOUSE LIKE SCRUBBING FLOORS OR LIFTING AND MOVING HEAVY FURNITURE: YES
CAN YOU TAKE CARE OF YOURSELF (EAT, DRESS, BATHE, OR USE TOILET): YES
CAN YOU HAVE SEXUAL RELATIONS: YES

## 2024-01-24 ASSESSMENT — PAIN DESCRIPTION - DESCRIPTORS: DESCRIPTORS: ACHING;HEAVINESS

## 2024-01-24 ASSESSMENT — ENCOUNTER SYMPTOMS
PSYCHIATRIC NEGATIVE: 1
NEUROLOGICAL NEGATIVE: 1
EYES NEGATIVE: 1
ARTHRALGIAS: 1
ENDOCRINE NEGATIVE: 1
GASTROINTESTINAL NEGATIVE: 1
CARDIOVASCULAR NEGATIVE: 1
RESPIRATORY NEGATIVE: 1
CONSTITUTIONAL NEGATIVE: 1

## 2024-01-24 ASSESSMENT — ACTIVITIES OF DAILY LIVING (ADL): EFFECT OF PAIN ON DAILY ACTIVITIES: LIMITED ROM

## 2024-01-24 ASSESSMENT — PAIN SCALES - GENERAL: PAINLEVEL_OUTOF10: 3

## 2024-01-24 ASSESSMENT — PAIN - FUNCTIONAL ASSESSMENT: PAIN_FUNCTIONAL_ASSESSMENT: 0-10

## 2024-01-24 ASSESSMENT — CHADS2 SCORE
HYPERTENSION: YES
CHADS2 SCORE: 1
DIABETES: NO
AGE GREATER THAN OR EQUAL TO 75: NO
CHF: NO
PRIOR STROKE OR TIA OR THROMBOEMBOLISM: NO

## 2024-01-24 NOTE — PREPROCEDURE INSTRUCTIONS
PAT DISCHARGE INSTRUCTIONS    Please call the Same Day Surgery (SDS) Department of the hospital where your procedure will be performed after 2:00 PM the day before your surgery. If you are scheduled on a Monday, or a Tuesday following a Monday holiday, you will need to call on the last business day prior to your surgery.    [facility]  [address] [facility] [address], [address]  [phone]    [facility]  [address] [place] [address] [phone]    [facility]  [address]  [place] [id number]  [phone]    Please let your surgeon know if:      You develop any open sores, shingles, burning or painful urination as these may increase your risk of an infection.   You no longer wish to have the surgery.   Any other personal circumstances change that may lead to the need to cancel or defer this surgery-such as being sick or getting admitted to any hospital within one week of your planned procedure.    Your contact details change, such as a change of address or phone number.    Starting now:     Please DO NOT drink alcohol or smoke for 24 hours before surgery. It is well known that quitting smoking can make a huge difference to your health and recovery from surgery. The longer you abstain from smoking, the better your chances of a healthy recovery. If you need help with quitting, call 9-800-QUIT-NOW to be connected to a trained counselor who will discuss the best methods to help you quit.     Before your surgery:    Please stop all supplements 7 days prior to surgery. Or as directed by your surgeon.   Please stop taking NSAID pain medicine such as Advil and Motrin 7 days before surgery.    If you develop any fever, cough, cold, rashes, cuts, scratches, scrapes, urinary symptoms or infection anywhere on your body (including teeth and gums) prior to surgery, please call your surgeon’s office as soon as  possible. This may require treatment to reduce the chance of cancellation on the day of surgery.    The day before your surgery:   DIET- Do not eat any food after MIDNIGHT. May have 10 ounces of CLEAR LIQUIDS until TWO HOURS before your arrival time. This includes water, black tea or coffee (no milk ir cream), apple juice and electrolyte drinks (Gatorade). May chew gum until TWO hours before your surgery time.   Get a good night’s rest.  Use the special soap for bathing if you have been instructed to use one.    Scheduled surgery times may change and you will be notified if this occurs - please check your personal voicemail for any updates.     On the morning of surgery:   Wear comfortable, loose fitting clothes which open in the front. Please do not wear moisturizers, creams, lotions, makeup or perfume.    Please bring with you to surgery:   Photo ID and insurance card   Current list of medicines and allergies   Pacemaker/ Defibrillator/Heart stent cards   CPAP machine and mask    Slings/ splints/ crutches   A copy of your complete advanced directive/DHPOA.    Please do NOT bring with you to surgery:   All jewelry and valuables should be left at home.   Prosthetic devices such as contact lenses, hearing aids, dentures, eyelash extensions, hairpins and body piercings must be removed prior to going in to the surgical suite.    After outpatient surgery:   A responsible adult MUST accompany you at the time of discharge and stay with you for 24 hours after your surgery. You may NOT drive yourself home after surgery.    Do not drive, operate machinery, make critical decisions or do activities that require co-ordination or balance until after a night’s sleep.   Do not drink alcoholic beverages for 24 hours.   Instructions for resuming your medications will be provided by your surgeon.    CALL YOUR DOCTOR AFTER SURGERY IF YOU HAVE:     Chills and/or a fever of 101° F or higher.    Redness, swelling, pus or drainage from  your surgical wound or a bad smell from the wound.    Lightheadedness, fainting or confusion.    Persistent vomiting (throwing up) and are not able to eat or drink for 12 hours.    Three or more loose, watery bowel movements in 24 hours (diarrhea).   Difficulty or pain while urinating( after non-urological surgery)    Pain and swelling in your legs, especially if it is only on one side.    Difficulty breathing or are breathing faster than normal.    Any new concerning symptoms.     Home Preoperative Antibacterial Shower    What is a home preoperative antibacterial shower?  This shower is a way of cleaning the skin with a germ killing solution before surgery. The solution contains chlorhexidine, commonly known as CHG. CHG is a skin cleanser with germ killing ability. Let your doctor know if you are allergic to chlorhexidine.      Why do I need to take a preoperative antibacterial shower?  Skin is not sterile. It is best to try to make your skin as free of germs as possible before surgery. Proper cleansing with a germ killing soap before surgery can lower the number of germs on your skin. This helps to reduce the risk of infection at the surgical site. Following the instructions listed below will help you prepare your skin for surgery.    How do I use the solution?      Steps: Begin using your CHG soap FIVE DAYS BEFORE your scheduled surgery on __________________________________________________.  First, wash and rinse your hair using the CHG soap. Keep CHG away soap away from ear canals and eyes.  Rinse completely, do not condition. Hair extensions should be removed.  Wash your face with your normal soap and rinse.  Apply the CHG solution to a clean wet washcloth. Turn the water off or move away from the water spray to avoid premature rinsing of the CHG soap as you are applying.  Firmly lather your entire body from neck down. Do not use on face.  Pay special attention to the areas(s) where your incision(s) will be  located unless they are on your face.  Avoid scrubbing your skin too hard.  The important point is to have the CHG soap sit on your skin for 3 minutes.  DO NOT wash with regular soap after you have used the CHG soap solution.  Pat yourself dry with a clean, freshly laundered towel.  DO NOT apply powders, deodorants or lotions.  Dress in clean, freshly laundered night clothes.  Be sure to sleep with clean, freshly laundered sheets.  Be aware that CHG will cause stains on fabrics; if you wash them with bleach after use. Rinse your washcloth and other linens that have contact with CHG completely. Use only non-chlorine detergents to launder the items used.  The morning of surgery is the fifth day. Repeat the above steps and dress in clean comfortable clothing.      Who should I call if I have any questions regarding the use of CHG soap?  Call the [facility] at [phone] if you have any questions.       Patient Information: Oral/Dental Rinse    What is oral/dental rinse?  It is a mouthwash. It is a way of cleaning the mouth with a germ killing solution before your surgery. The solution contains chlorhexidine, commonly know as CHG.  It is used inside the mouth to kill bacteria known as Staphylococcus aureus.  Let your doctor know if you are allergic to chlorhexidine.    Why do I need to use CHG oral/dental rinse?  The CHG oral/dental rinse helps to kill bacteria in your mouth known as Staphylococcus aureus. This reduces the risk of infection at the surgical site.    Using your CHG oral/dental rinse.  STEPS: use your CHG oral/dental rinse after you brush your teeth the night before (at bedtime) and the morning of your surgery. Follow the directions on your prescription label.  Use the cap on the container to measure 15ml (fill cap to fill line)  Swish (gargle if you can) the mouthwash in your mouth for at least 30 seconds, (do not swallow) spit  out.  After you use your CHG rinse, do not rinse your mouth with water, drink or eat. Please refer to prescription label for the appropriate time to resume oral intake.    What side effects might I have using the CHG oral/dental rinse?  CHG rinse will stick to the plaque on the teeth. Brush and floss just before use. Teeth brushing will help avoid staining of plaque during use.    Who should I contact if I have questions about the CHG oral/dental rinse?  Please call [facility], [facility] at [phone] if you have any questions. Patient Information: Oral/Dental Rinse    What is oral/dental rinse?  It is a mouthwash. It is a way of cleaning the mouth with a germ killing solution before your surgery. The solution contains chlorhexidine, commonly know as CHG.  It is used inside the mouth to kill bacteria known as Staphylococcus aureus.  Let your doctor know if you are allergic to chlorhexidine.    Why do I need to use CHG oral/dental rinse?  The CHG oral/dental rinse helps to kill bacteria in your mouth known as Staphylococcus aureus. This reduces the risk of infection at the surgical site.    Using your CHG oral/dental rinse.  STEPS: use your CHG oral/dental rinse after you brush your teeth the night before (at bedtime) and the morning of your surgery. Follow the directions on your prescription label.  Use the cap on the container to measure 15ml (fill cap to fill line)  Swish (gargle if you can) the mouthwash in your mouth for at least 30 seconds, (do not swallow) spit out.  After you use your CHG rinse, do not rinse your mouth with water, drink or eat. Please refer to prescription label for the appropriate time to resume oral intake.    What side effects might I have using the CHG oral/dental rinse?  CHG rinse will stick to the plaque on the teeth. Brush and floss just before use. Teeth brushing will help avoid staining of plaque during use.    Who should I  contact if I have questions about the CHG oral/dental rinse?  Please call [facility], [facility] at [phone] if you have any questions.     Medication List            Accurate as of January 24, 2024  9:47 AM. Always use your most recent med list.                acetaminophen 500 mg tablet  Commonly known as: Tylenol  Medication Adjustments for Surgery: Take morning of surgery with sip of water, no other fluids     calcium carbonate 600 mg calcium (1,500 mg) tablet  Medication Adjustments for Surgery: Stop 7 days before surgery     Centrum Silver  Generic drug: multivitamin with minerals iron-free  Medication Adjustments for Surgery: Stop 7 days before surgery     cyclobenzaprine 10 mg tablet  Commonly known as: Flexeril  Take 1 tablet (10 mg) by mouth 3 times a day as needed for muscle spasms.  Medication Adjustments for Surgery: Stop 7 days before surgery     Drysol Dab-O-Matic 20 % external solution  Generic drug: aluminum chloride  Medication Adjustments for Surgery: Continue until night before surgery     meloxicam 15 mg tablet  Commonly known as: Mobic  Take 1 tablet (15 mg) by mouth once daily.  Medication Adjustments for Surgery: Stop 7 days before surgery     omeprazole 20 mg DR capsule  Commonly known as: PriLOSEC  TAKE 1 CAPSULE BY MOUTH EVERY DAY  Medication Adjustments for Surgery: Take morning of surgery with sip of water, no other fluids     rosuvastatin 5 mg tablet  Commonly known as: Crestor  Medication Adjustments for Surgery: Continue until night before surgery     Voltaren Arthritis Pain 1 % gel gel  Generic drug: diclofenac sodium  Medication Adjustments for Surgery: Stop 7 days before surgery

## 2024-01-24 NOTE — CPM/PAT H&P
"CPM/PAT Evaluation       Name: Klever Moran (Klever Moran)  /Age: 1950/73 y.o.     In-Person       Chief Complaint: \"shoulder pain\"    HPI  The patient is a 73 year old male.  For the past several years, but especially in the past several months he has experienced non-radiating left shoulder pain with reaching and lifting.  He has associated left shoulder weakness, but no swelling, numbness or tingling.  The pain resolves with rest and the frequency depends on his activities.  He was seen by Dr. Blanco and on 2023 a left shoulder CT demonstrated severe glenohumeral osteoarthrosis.  Surgical intervention is recommended.    Past Medical History:   Diagnosis Date    Encounter for screening for cardiovascular disorders 10/08/2019    Screening for abdominal aortic aneurysm    Encounter for screening for cardiovascular disorders 2021    Screening, heart disease, ischemic    GERD (gastroesophageal reflux disease)     Hyperlipidemia     Hypertension     Injury of conjunctiva and corneal abrasion without foreign body, left eye, initial encounter 2021    Abrasion of left cornea, initial encounter    Occipital neuralgia 2018    Occipital neuralgia of right side    Other chest pain 2021    Chest pain, atypical    Other conditions influencing health status     Adverse Reaction To Anesthesia    Other malaise 2021    Malaise and fatigue    Personal history of other diseases of the musculoskeletal system and connective tissue 2021    History of neck pain    Personal history of other diseases of the nervous system and sense organs     History of hearing loss    Personal history of other specified conditions 2017    History of paresthesia    Personal history of other specified conditions 2020    History of weakness    PONV (postoperative nausea and vomiting)     Post laminectomy syndrome     Radiculopathy, lumbar region 09/10/2020    Lumbar radiculitis    " Superficial foreign body of unspecified hand, initial encounter 2021    Acute foreign body of hand    Unspecified hearing loss, right ear 2021    Hearing loss of right ear, unspecified hearing loss type       Past Surgical History:   Procedure Laterality Date    ARTHROPLASTY Right     Right thumb arthroplasty    ARTHROPLASTY Left     Left thumb arthroplasty    CATARACT EXTRACTION Bilateral     CERVICAL FUSION      C3-C7    COCHLEAR IMPLANT  2022    COLONOSCOPY      ,     HAND SURGERY  2013    Hand Surgery                                                                                                                                                          HERNIA REPAIR  2009    Hernia Repair    KNEE ARTHROPLASTY      KNEE SURGERY  2009    Knee Surgery Right    ROTATOR CUFF REPAIR Right     ,     SPINAL FUSION  2015    L4-S1 fusion     Family History   Problem Relation Name Age of Onset    No Known Problems Mother       Social History     Tobacco Use    Smoking status: Former     Packs/day: 2.00     Years: 14.00     Additional pack years: 0.00     Total pack years: 28.00     Types: Cigarettes     Start date:      Quit date:      Years since quittin.0    Smokeless tobacco: Never   Substance Use Topics    Alcohol use: Not Currently     Social History     Substance and Sexual Activity   Drug Use Never     No Known Allergies    Current Outpatient Medications   Medication Sig Dispense Refill    acetaminophen (Tylenol) 500 mg tablet Take 1 tablet (500 mg) by mouth. Repeat every 4 to 6 hours as needed      aluminum chloride (Drysol Dab-O-Matic) 20 % external solution APPLY TO AFFECTED AREA DAILY AS NEEDED TO CONTROL SWEATING.      calcium carbonate 600 mg calcium (1,500 mg) tablet Calcium 600 MG Oral Tablet Refills: 0 DO Active      [START ON 2024] chlorhexidine (Peridex) 0.12 % solution Use as directed - patient may  prescription prior to  "2/6/2024. Do not start before February 6, 2024. 473 mL 0    cyclobenzaprine (Flexeril) 10 mg tablet Take 1 tablet (10 mg) by mouth 3 times a day as needed for muscle spasms. 90 tablet 0    diclofenac sodium (Voltaren Arthritis Pain) 1 % gel gel Apply 1 Application topically 4 times a day as needed.      meloxicam (Mobic) 15 mg tablet Take 1 tablet (15 mg) by mouth once daily. 90 tablet 1    multivitamin-iron-minerals-folic acid (Centrum Silver) 0.4 mg-300 mcg- 250 mcg tablet Centrum Silver Oral Tablet   Refills: 0        Start : 5-Aug-2020   Active      omeprazole (PriLOSEC) 20 mg DR capsule TAKE 1 CAPSULE BY MOUTH EVERY DAY 90 capsule 3    rosuvastatin (Crestor) 5 mg tablet Take 1 tablet (5 mg) by mouth once daily.       No current facility-administered medications for this visit.     Review of Systems   Constitutional: Negative.    HENT: Negative.     Eyes: Negative.    Respiratory: Negative.     Cardiovascular: Negative.    Gastrointestinal: Negative.    Endocrine: Negative.    Genitourinary: Negative.    Musculoskeletal:  Positive for arthralgias.   Neurological: Negative.    Psychiatric/Behavioral: Negative.       BP (!) 173/93   Pulse 97   Temp 36.1 °C (97 °F) (Temporal)   Resp 18   Ht 1.778 m (5' 10\")   Wt 73.5 kg (162 lb)   SpO2 100%   BMI 23.24 kg/m²     Physical Exam  Vitals reviewed.   Constitutional:       Appearance: Normal appearance.   HENT:      Head: Normocephalic and atraumatic.      Ears:      Comments: Left cochlear implant in place.     Mouth/Throat:      Mouth: Mucous membranes are moist.      Pharynx: Oropharynx is clear.   Eyes:      Extraocular Movements: Extraocular movements intact.      Pupils: Pupils are equal, round, and reactive to light.   Cardiovascular:      Rate and Rhythm: Normal rate and regular rhythm.      Heart sounds: Normal heart sounds.   Pulmonary:      Breath sounds: Normal breath sounds.   Abdominal:      General: Bowel sounds are normal.      Palpations: Abdomen " is soft.   Musculoskeletal:      Comments: Tenderness with palpation left shoulder.  Limited range of motion left shoulder.   Skin:     General: Skin is warm and dry.   Neurological:      General: No focal deficit present.      Mental Status: He is alert and oriented to person, place, and time.   Psychiatric:         Mood and Affect: Mood normal.         Behavior: Behavior normal.        PAT AIRWAY:   Airway:     Mallampati::  II    TM distance::  >3 FB    Neck ROM::  Full    ASA:  II  DASI RISK SCORE:  58.2  METS SCORE:  9.9  CHAD2 SCORE:  2.8%  REVISED CARDIAC RISK INDEX:  0.4%  STOP BANG SCORE:  2    Assessment and Plan:     Osteoarthritis of left shoulder, unspecified osteoarthritis type:  Left reverse total shoulder arthroplasty  Essential Hypertension - currently no medications - home BP readings are normal    Kaylee Gonzalez PA-C

## 2024-01-25 ENCOUNTER — OFFICE VISIT (OUTPATIENT)
Dept: PRIMARY CARE | Facility: CLINIC | Age: 74
End: 2024-01-25
Payer: MEDICARE

## 2024-01-25 VITALS
OXYGEN SATURATION: 99 % | HEIGHT: 70 IN | DIASTOLIC BLOOD PRESSURE: 60 MMHG | SYSTOLIC BLOOD PRESSURE: 126 MMHG | BODY MASS INDEX: 23.05 KG/M2 | WEIGHT: 161 LBS | HEART RATE: 86 BPM

## 2024-01-25 DIAGNOSIS — I10 PRIMARY HYPERTENSION: Primary | ICD-10-CM

## 2024-01-25 DIAGNOSIS — M19.012 PRIMARY OSTEOARTHRITIS, LEFT SHOULDER: ICD-10-CM

## 2024-01-25 DIAGNOSIS — E78.2 MIXED HYPERLIPIDEMIA: ICD-10-CM

## 2024-01-25 LAB
ATRIAL RATE: 101 BPM
P AXIS: 55 DEGREES
P OFFSET: 189 MS
P ONSET: 133 MS
PR INTERVAL: 182 MS
Q ONSET: 224 MS
QRS COUNT: 17 BEATS
QRS DURATION: 138 MS
QT INTERVAL: 372 MS
QTC CALCULATION(BAZETT): 482 MS
QTC FREDERICIA: 442 MS
R AXIS: -3 DEGREES
T AXIS: 44 DEGREES
T OFFSET: 410 MS
VENTRICULAR RATE: 101 BPM

## 2024-01-25 PROCEDURE — 1125F AMNT PAIN NOTED PAIN PRSNT: CPT | Performed by: FAMILY MEDICINE

## 2024-01-25 PROCEDURE — 1159F MED LIST DOCD IN RCRD: CPT | Performed by: FAMILY MEDICINE

## 2024-01-25 PROCEDURE — 1036F TOBACCO NON-USER: CPT | Performed by: FAMILY MEDICINE

## 2024-01-25 PROCEDURE — 99214 OFFICE O/P EST MOD 30 MIN: CPT | Performed by: FAMILY MEDICINE

## 2024-01-25 PROCEDURE — 1157F ADVNC CARE PLAN IN RCRD: CPT | Performed by: FAMILY MEDICINE

## 2024-01-25 PROCEDURE — 3078F DIAST BP <80 MM HG: CPT | Performed by: FAMILY MEDICINE

## 2024-01-25 PROCEDURE — 3074F SYST BP LT 130 MM HG: CPT | Performed by: FAMILY MEDICINE

## 2024-01-25 RX ORDER — ROSUVASTATIN CALCIUM 5 MG/1
5 TABLET, COATED ORAL DAILY
Qty: 90 TABLET | Refills: 3 | Status: SHIPPED | OUTPATIENT
Start: 2024-01-25

## 2024-01-25 ASSESSMENT — ENCOUNTER SYMPTOMS
SHORTNESS OF BREATH: 0
FATIGUE: 0
PALPITATIONS: 0

## 2024-01-25 ASSESSMENT — COLUMBIA-SUICIDE SEVERITY RATING SCALE - C-SSRS
1. IN THE PAST MONTH, HAVE YOU WISHED YOU WERE DEAD OR WISHED YOU COULD GO TO SLEEP AND NOT WAKE UP?: NO
2. HAVE YOU ACTUALLY HAD ANY THOUGHTS OF KILLING YOURSELF?: NO
6. HAVE YOU EVER DONE ANYTHING, STARTED TO DO ANYTHING, OR PREPARED TO DO ANYTHING TO END YOUR LIFE?: NO

## 2024-01-25 ASSESSMENT — PATIENT HEALTH QUESTIONNAIRE - PHQ9
2. FEELING DOWN, DEPRESSED OR HOPELESS: NOT AT ALL
1. LITTLE INTEREST OR PLEASURE IN DOING THINGS: NOT AT ALL
SUM OF ALL RESPONSES TO PHQ9 QUESTIONS 1 AND 2: 0

## 2024-01-25 NOTE — PROGRESS NOTES
"Subjective   Patient ID: Klever Moran is a 73 y.o. male who presents for Pre-op Exam (Arthroplasty Reverse Shoulder with Dr. Blanco on 2/7/24).    HPI     Here for pre-surgery clearance for L shoulder replacement. Has had several surgeries before. Typically gets emesis from anesthesia after. Never had issue waking up from anesthesia. Has hardware in R knee and back. No chest pain, shortness of breath, syncopal episodes. He has not been sick recently.    No side effects from current medications.     Dermatology gave document recommending nicotinamide and he wants to know if he should be taking supplement    Review of Systems   Constitutional:  Negative for fatigue.   Respiratory:  Negative for shortness of breath.    Cardiovascular:  Negative for chest pain and palpitations.       Objective   /60   Pulse 86   Ht 1.778 m (5' 10\")   Wt 73 kg (161 lb)   SpO2 99%   BMI 23.10 kg/m²     Physical Exam  NAD alert and oriented.  HEENT EOMI, PERRL No scleral icterus. Neck supple.  No lymphadenopathy or thyromegaly.      Lungs clear to auscultation.  Heart regular rate and rhythm.      Assessment/Plan   Problem List Items Addressed This Visit             ICD-10-CM    Primary osteoarthritis, left shoulder M19.012    Primary hypertension - Primary I10       Plan  Left shoulder arthroplasty; patient looks good for surgery will send clearance letter to surgeon  Discussed nicotinamide with patient, counseled him that he could take the supplement if he wanted to, risks are minimal but it is up to him    Note authored by Mary Cowden, MS4   Patient has already been instructed to stop NSAID and multivitamin  Moderate risk surgery, average cardiac risk, proceed with surgery  Recheck 6 months sooner if any issues arise  "

## 2024-01-26 ENCOUNTER — TELEPHONE (OUTPATIENT)
Dept: ORTHOPEDIC SURGERY | Facility: HOSPITAL | Age: 74
End: 2024-01-26
Payer: MEDICARE

## 2024-01-26 LAB — STAPHYLOCOCCUS SPEC CULT: NORMAL

## 2024-01-26 NOTE — TELEPHONE ENCOUNTER
Please call [phone] at your earliest convenience to discuss the medical equipment ordered by your surgeon for after your upcoming surgery.

## 2024-02-05 ENCOUNTER — ANESTHESIA EVENT (OUTPATIENT)
Dept: OPERATING ROOM | Facility: HOSPITAL | Age: 74
End: 2024-02-05
Payer: MEDICARE

## 2024-02-07 ENCOUNTER — APPOINTMENT (OUTPATIENT)
Dept: RADIOLOGY | Facility: HOSPITAL | Age: 74
End: 2024-02-07
Payer: MEDICARE

## 2024-02-07 ENCOUNTER — ANESTHESIA (OUTPATIENT)
Dept: OPERATING ROOM | Facility: HOSPITAL | Age: 74
End: 2024-02-07
Payer: MEDICARE

## 2024-02-07 ENCOUNTER — HOSPITAL ENCOUNTER (OUTPATIENT)
Facility: HOSPITAL | Age: 74
Setting detail: OBSERVATION
Discharge: HOME | End: 2024-02-08
Attending: ORTHOPAEDIC SURGERY | Admitting: ORTHOPAEDIC SURGERY
Payer: MEDICARE

## 2024-02-07 DIAGNOSIS — M19.012 PRIMARY OSTEOARTHRITIS OF LEFT SHOULDER: ICD-10-CM

## 2024-02-07 DIAGNOSIS — K59.03 DRUG-INDUCED CONSTIPATION: ICD-10-CM

## 2024-02-07 DIAGNOSIS — R33.9 URINARY RETENTION: ICD-10-CM

## 2024-02-07 DIAGNOSIS — M19.012 OSTEOARTHRITIS OF LEFT SHOULDER, UNSPECIFIED OSTEOARTHRITIS TYPE: Primary | ICD-10-CM

## 2024-02-07 PROBLEM — Z98.890 PONV (POSTOPERATIVE NAUSEA AND VOMITING): Status: ACTIVE | Noted: 2024-02-07

## 2024-02-07 PROBLEM — R11.2 PONV (POSTOPERATIVE NAUSEA AND VOMITING): Status: ACTIVE | Noted: 2024-02-07

## 2024-02-07 PROCEDURE — 2720000007 HC OR 272 NO HCPCS: Performed by: ORTHOPAEDIC SURGERY

## 2024-02-07 PROCEDURE — A4649 SURGICAL SUPPLIES: HCPCS | Performed by: ORTHOPAEDIC SURGERY

## 2024-02-07 PROCEDURE — 23430 REPAIR BICEPS TENDON: CPT | Performed by: PHYSICIAN ASSISTANT

## 2024-02-07 PROCEDURE — 73020 X-RAY EXAM OF SHOULDER: CPT | Mod: LT

## 2024-02-07 PROCEDURE — 2500000004 HC RX 250 GENERAL PHARMACY W/ HCPCS (ALT 636 FOR OP/ED): Performed by: STUDENT IN AN ORGANIZED HEALTH CARE EDUCATION/TRAINING PROGRAM

## 2024-02-07 PROCEDURE — 2500000005 HC RX 250 GENERAL PHARMACY W/O HCPCS: Performed by: ANESTHESIOLOGIST ASSISTANT

## 2024-02-07 PROCEDURE — A23472 PR RECONSTR TOTAL SHOULDER IMPLANT: Performed by: ANESTHESIOLOGIST ASSISTANT

## 2024-02-07 PROCEDURE — 2500000004 HC RX 250 GENERAL PHARMACY W/ HCPCS (ALT 636 FOR OP/ED): Performed by: PHYSICIAN ASSISTANT

## 2024-02-07 PROCEDURE — 2780000003 HC OR 278 NO HCPCS: Performed by: ORTHOPAEDIC SURGERY

## 2024-02-07 PROCEDURE — 2500000001 HC RX 250 WO HCPCS SELF ADMINISTERED DRUGS (ALT 637 FOR MEDICARE OP): Performed by: PHYSICIAN ASSISTANT

## 2024-02-07 PROCEDURE — 99100 ANES PT EXTEME AGE<1 YR&>70: CPT | Performed by: STUDENT IN AN ORGANIZED HEALTH CARE EDUCATION/TRAINING PROGRAM

## 2024-02-07 PROCEDURE — G0378 HOSPITAL OBSERVATION PER HR: HCPCS

## 2024-02-07 PROCEDURE — 2500000002 HC RX 250 W HCPCS SELF ADMINISTERED DRUGS (ALT 637 FOR MEDICARE OP, ALT 636 FOR OP/ED): Performed by: STUDENT IN AN ORGANIZED HEALTH CARE EDUCATION/TRAINING PROGRAM

## 2024-02-07 PROCEDURE — 2500000005 HC RX 250 GENERAL PHARMACY W/O HCPCS: Performed by: PHYSICIAN ASSISTANT

## 2024-02-07 PROCEDURE — 3700000001 HC GENERAL ANESTHESIA TIME - INITIAL BASE CHARGE: Performed by: ORTHOPAEDIC SURGERY

## 2024-02-07 PROCEDURE — 7100000001 HC RECOVERY ROOM TIME - INITIAL BASE CHARGE: Performed by: ORTHOPAEDIC SURGERY

## 2024-02-07 PROCEDURE — 2500000005 HC RX 250 GENERAL PHARMACY W/O HCPCS: Performed by: ORTHOPAEDIC SURGERY

## 2024-02-07 PROCEDURE — 23472 RECONSTRUCT SHOULDER JOINT: CPT | Performed by: ORTHOPAEDIC SURGERY

## 2024-02-07 PROCEDURE — 23430 REPAIR BICEPS TENDON: CPT | Performed by: ORTHOPAEDIC SURGERY

## 2024-02-07 PROCEDURE — 23472 RECONSTRUCT SHOULDER JOINT: CPT | Performed by: PHYSICIAN ASSISTANT

## 2024-02-07 PROCEDURE — 64415 NJX AA&/STRD BRCH PLXS IMG: CPT | Performed by: STUDENT IN AN ORGANIZED HEALTH CARE EDUCATION/TRAINING PROGRAM

## 2024-02-07 PROCEDURE — C1713 ANCHOR/SCREW BN/BN,TIS/BN: HCPCS | Performed by: ORTHOPAEDIC SURGERY

## 2024-02-07 PROCEDURE — 96361 HYDRATE IV INFUSION ADD-ON: CPT | Mod: 59

## 2024-02-07 PROCEDURE — A23472 PR RECONSTR TOTAL SHOULDER IMPLANT: Performed by: STUDENT IN AN ORGANIZED HEALTH CARE EDUCATION/TRAINING PROGRAM

## 2024-02-07 PROCEDURE — 3600000010 HC OR TIME - EACH INCREMENTAL 1 MINUTE - PROCEDURE LEVEL FIVE: Performed by: ORTHOPAEDIC SURGERY

## 2024-02-07 PROCEDURE — C1776 JOINT DEVICE (IMPLANTABLE): HCPCS | Performed by: ORTHOPAEDIC SURGERY

## 2024-02-07 PROCEDURE — 96365 THER/PROPH/DIAG IV INF INIT: CPT | Mod: 59

## 2024-02-07 PROCEDURE — 2500000001 HC RX 250 WO HCPCS SELF ADMINISTERED DRUGS (ALT 637 FOR MEDICARE OP): Performed by: STUDENT IN AN ORGANIZED HEALTH CARE EDUCATION/TRAINING PROGRAM

## 2024-02-07 PROCEDURE — 3600000005 HC OR TIME - INITIAL BASE CHARGE - PROCEDURE LEVEL FIVE: Performed by: ORTHOPAEDIC SURGERY

## 2024-02-07 PROCEDURE — 7100000002 HC RECOVERY ROOM TIME - EACH INCREMENTAL 1 MINUTE: Performed by: ORTHOPAEDIC SURGERY

## 2024-02-07 PROCEDURE — 3700000002 HC GENERAL ANESTHESIA TIME - EACH INCREMENTAL 1 MINUTE: Performed by: ORTHOPAEDIC SURGERY

## 2024-02-07 PROCEDURE — 2500000004 HC RX 250 GENERAL PHARMACY W/ HCPCS (ALT 636 FOR OP/ED): Performed by: ANESTHESIOLOGIST ASSISTANT

## 2024-02-07 DEVICE — DYNANITE VIP GLENOID PIN, NITINOL, 2.8MM
Type: IMPLANTABLE DEVICE | Site: SHOULDER | Status: NON-FUNCTIONAL
Brand: ARTHREX®

## 2024-02-07 DEVICE — 20MM CENTRAL POST, MODULAR
Type: IMPLANTABLE DEVICE | Site: SHOULDER | Status: FUNCTIONAL
Brand: ARTHREX®

## 2024-02-07 DEVICE — 5.5X28MM PERIPHERAL SCREW, LOCKING
Type: IMPLANTABLE DEVICE | Site: SHOULDER | Status: FUNCTIONAL
Brand: ARTHREX®

## 2024-02-07 DEVICE — 5.5X20MM PERIPHERAL SCREW, LOCKING
Type: IMPLANTABLE DEVICE | Site: SHOULDER | Status: FUNCTIONAL
Brand: ARTHREX®

## 2024-02-07 DEVICE — SCREW, NON-LOCKING, 4.5MM X 32MM: Type: IMPLANTABLE DEVICE | Site: SHOULDER | Status: FUNCTIONAL

## 2024-02-07 DEVICE — 39 +4 LAT/24 GLENOSPHERE
Type: IMPLANTABLE DEVICE | Site: SHOULDER | Status: FUNCTIONAL
Brand: ARTHREX®

## 2024-02-07 DEVICE — UNIVERS REVERS SPACER 39+9MM
Type: IMPLANTABLE DEVICE | Site: SHOULDER | Status: FUNCTIONAL
Brand: ARTHREX®

## 2024-02-07 DEVICE — IMPLANTABLE DEVICE: Type: IMPLANTABLE DEVICE | Site: SHOULDER | Status: FUNCTIONAL

## 2024-02-07 DEVICE — UNIVERS REVERS HUMERAL STEM, SIZE 10
Type: IMPLANTABLE DEVICE | Site: SHOULDER | Status: FUNCTIONAL
Brand: ARTHREX®

## 2024-02-07 DEVICE — 24MM +4 LAT BASEPLATE, MODULAR
Type: IMPLANTABLE DEVICE | Site: SHOULDER | Status: FUNCTIONAL
Brand: ARTHREX®

## 2024-02-07 RX ORDER — ACETAMINOPHEN 325 MG/1
650 TABLET ORAL EVERY 4 HOURS PRN
Status: DISCONTINUED | OUTPATIENT
Start: 2024-02-07 | End: 2024-02-07

## 2024-02-07 RX ORDER — OXYCODONE HYDROCHLORIDE 5 MG/1
5 TABLET ORAL EVERY 6 HOURS PRN
Status: DISCONTINUED | OUTPATIENT
Start: 2024-02-07 | End: 2024-02-08 | Stop reason: HOSPADM

## 2024-02-07 RX ORDER — MEPERIDINE HYDROCHLORIDE 25 MG/ML
12.5 INJECTION INTRAMUSCULAR; INTRAVENOUS; SUBCUTANEOUS EVERY 10 MIN PRN
Status: DISCONTINUED | OUTPATIENT
Start: 2024-02-07 | End: 2024-02-07 | Stop reason: HOSPADM

## 2024-02-07 RX ORDER — ONDANSETRON HYDROCHLORIDE 2 MG/ML
4 INJECTION, SOLUTION INTRAVENOUS EVERY 8 HOURS PRN
Status: DISCONTINUED | OUTPATIENT
Start: 2024-02-07 | End: 2024-02-08 | Stop reason: HOSPADM

## 2024-02-07 RX ORDER — PHENYLEPHRINE HCL IN 0.9% NACL 1 MG/10 ML
SYRINGE (ML) INTRAVENOUS AS NEEDED
Status: DISCONTINUED | OUTPATIENT
Start: 2024-02-07 | End: 2024-02-07

## 2024-02-07 RX ORDER — FENTANYL CITRATE 50 UG/ML
50 INJECTION, SOLUTION INTRAMUSCULAR; INTRAVENOUS EVERY 5 MIN PRN
Status: DISCONTINUED | OUTPATIENT
Start: 2024-02-07 | End: 2024-02-07 | Stop reason: HOSPADM

## 2024-02-07 RX ORDER — OXYCODONE HYDROCHLORIDE 5 MG/1
10 TABLET ORAL EVERY 4 HOURS PRN
Status: DISCONTINUED | OUTPATIENT
Start: 2024-02-07 | End: 2024-02-08 | Stop reason: HOSPADM

## 2024-02-07 RX ORDER — ESMOLOL HYDROCHLORIDE 10 MG/ML
INJECTION INTRAVENOUS AS NEEDED
Status: DISCONTINUED | OUTPATIENT
Start: 2024-02-07 | End: 2024-02-07

## 2024-02-07 RX ORDER — PROPOFOL 10 MG/ML
INJECTION, EMULSION INTRAVENOUS AS NEEDED
Status: DISCONTINUED | OUTPATIENT
Start: 2024-02-07 | End: 2024-02-07

## 2024-02-07 RX ORDER — NEOSTIGMINE METHYLSULFATE 1 MG/ML
INJECTION, SOLUTION INTRAVENOUS AS NEEDED
Status: DISCONTINUED | OUTPATIENT
Start: 2024-02-07 | End: 2024-02-07

## 2024-02-07 RX ORDER — TRANEXAMIC ACID 100 MG/ML
INJECTION, SOLUTION INTRAVENOUS AS NEEDED
Status: DISCONTINUED | OUTPATIENT
Start: 2024-02-07 | End: 2024-02-07 | Stop reason: HOSPADM

## 2024-02-07 RX ORDER — ONDANSETRON HYDROCHLORIDE 2 MG/ML
INJECTION, SOLUTION INTRAVENOUS AS NEEDED
Status: DISCONTINUED | OUTPATIENT
Start: 2024-02-07 | End: 2024-02-07

## 2024-02-07 RX ORDER — CEFAZOLIN SODIUM 2 G/100ML
2 INJECTION, SOLUTION INTRAVENOUS ONCE
Status: COMPLETED | OUTPATIENT
Start: 2024-02-07 | End: 2024-02-07

## 2024-02-07 RX ORDER — ACETAMINOPHEN 325 MG/1
650 TABLET ORAL EVERY 6 HOURS SCHEDULED
Status: DISCONTINUED | OUTPATIENT
Start: 2024-02-07 | End: 2024-02-08 | Stop reason: HOSPADM

## 2024-02-07 RX ORDER — SIMETHICONE 80 MG
80 TABLET,CHEWABLE ORAL 4 TIMES DAILY PRN
Status: DISCONTINUED | OUTPATIENT
Start: 2024-02-07 | End: 2024-02-08 | Stop reason: HOSPADM

## 2024-02-07 RX ORDER — LIDOCAINE HYDROCHLORIDE 10 MG/ML
INJECTION, SOLUTION EPIDURAL; INFILTRATION; INTRACAUDAL; PERINEURAL AS NEEDED
Status: DISCONTINUED | OUTPATIENT
Start: 2024-02-07 | End: 2024-02-07

## 2024-02-07 RX ORDER — NALOXONE HYDROCHLORIDE 0.4 MG/ML
0.2 INJECTION, SOLUTION INTRAMUSCULAR; INTRAVENOUS; SUBCUTANEOUS EVERY 5 MIN PRN
Status: DISCONTINUED | OUTPATIENT
Start: 2024-02-07 | End: 2024-02-08 | Stop reason: HOSPADM

## 2024-02-07 RX ORDER — SODIUM CHLORIDE, SODIUM LACTATE, POTASSIUM CHLORIDE, CALCIUM CHLORIDE 600; 310; 30; 20 MG/100ML; MG/100ML; MG/100ML; MG/100ML
100 INJECTION, SOLUTION INTRAVENOUS CONTINUOUS
Status: DISCONTINUED | OUTPATIENT
Start: 2024-02-07 | End: 2024-02-07 | Stop reason: HOSPADM

## 2024-02-07 RX ORDER — PANTOPRAZOLE SODIUM 20 MG/1
20 TABLET, DELAYED RELEASE ORAL
Status: DISCONTINUED | OUTPATIENT
Start: 2024-02-08 | End: 2024-02-08 | Stop reason: HOSPADM

## 2024-02-07 RX ORDER — NALOXONE HYDROCHLORIDE 0.4 MG/ML
0.2 INJECTION, SOLUTION INTRAMUSCULAR; INTRAVENOUS; SUBCUTANEOUS EVERY 5 MIN PRN
Status: DISCONTINUED | OUTPATIENT
Start: 2024-02-07 | End: 2024-02-07 | Stop reason: SDUPTHER

## 2024-02-07 RX ORDER — DEXAMETHASONE SODIUM PHOSPHATE 4 MG/ML
INJECTION, SOLUTION INTRA-ARTICULAR; INTRALESIONAL; INTRAMUSCULAR; INTRAVENOUS; SOFT TISSUE AS NEEDED
Status: DISCONTINUED | OUTPATIENT
Start: 2024-02-07 | End: 2024-02-07

## 2024-02-07 RX ORDER — SODIUM CHLORIDE, SODIUM LACTATE, POTASSIUM CHLORIDE, CALCIUM CHLORIDE 600; 310; 30; 20 MG/100ML; MG/100ML; MG/100ML; MG/100ML
100 INJECTION, SOLUTION INTRAVENOUS CONTINUOUS
Status: DISCONTINUED | OUTPATIENT
Start: 2024-02-07 | End: 2024-02-08 | Stop reason: HOSPADM

## 2024-02-07 RX ORDER — SODIUM CHLORIDE, SODIUM LACTATE, POTASSIUM CHLORIDE, CALCIUM CHLORIDE 600; 310; 30; 20 MG/100ML; MG/100ML; MG/100ML; MG/100ML
40 INJECTION, SOLUTION INTRAVENOUS CONTINUOUS
Status: DISCONTINUED | OUTPATIENT
Start: 2024-02-07 | End: 2024-02-07 | Stop reason: HOSPADM

## 2024-02-07 RX ORDER — FENTANYL CITRATE 50 UG/ML
50 INJECTION, SOLUTION INTRAMUSCULAR; INTRAVENOUS ONCE
Status: COMPLETED | OUTPATIENT
Start: 2024-02-07 | End: 2024-02-07

## 2024-02-07 RX ORDER — POLYETHYLENE GLYCOL 3350 17 G/17G
17 POWDER, FOR SOLUTION ORAL DAILY
Status: DISCONTINUED | OUTPATIENT
Start: 2024-02-08 | End: 2024-02-08 | Stop reason: HOSPADM

## 2024-02-07 RX ORDER — LABETALOL HYDROCHLORIDE 5 MG/ML
5 INJECTION, SOLUTION INTRAVENOUS EVERY 5 MIN PRN
Status: DISCONTINUED | OUTPATIENT
Start: 2024-02-07 | End: 2024-02-07 | Stop reason: HOSPADM

## 2024-02-07 RX ORDER — PHENYLEPHRINE 10 MG/250 ML(40 MCG/ML)IN 0.9 % SOD.CHLORIDE INTRAVENOUS
CONTINUOUS PRN
Status: DISCONTINUED | OUTPATIENT
Start: 2024-02-07 | End: 2024-02-07

## 2024-02-07 RX ORDER — TRANEXAMIC ACID 100 MG/ML
INJECTION, SOLUTION INTRAVENOUS AS NEEDED
Status: DISCONTINUED | OUTPATIENT
Start: 2024-02-07 | End: 2024-02-07

## 2024-02-07 RX ORDER — ALUMINUM HYDROXIDE, MAGNESIUM HYDROXIDE, AND SIMETHICONE 1200; 120; 1200 MG/30ML; MG/30ML; MG/30ML
20 SUSPENSION ORAL EVERY 4 HOURS PRN
Status: DISCONTINUED | OUTPATIENT
Start: 2024-02-07 | End: 2024-02-08 | Stop reason: HOSPADM

## 2024-02-07 RX ORDER — ROCURONIUM BROMIDE 10 MG/ML
INJECTION, SOLUTION INTRAVENOUS AS NEEDED
Status: DISCONTINUED | OUTPATIENT
Start: 2024-02-07 | End: 2024-02-07

## 2024-02-07 RX ORDER — ONDANSETRON 4 MG/1
4 TABLET, ORALLY DISINTEGRATING ORAL EVERY 8 HOURS PRN
Status: DISCONTINUED | OUTPATIENT
Start: 2024-02-07 | End: 2024-02-08 | Stop reason: HOSPADM

## 2024-02-07 RX ORDER — CEFAZOLIN SODIUM 2 G/100ML
2 INJECTION, SOLUTION INTRAVENOUS EVERY 8 HOURS
Status: COMPLETED | OUTPATIENT
Start: 2024-02-07 | End: 2024-02-08

## 2024-02-07 RX ORDER — GLYCOPYRROLATE 0.2 MG/ML
INJECTION INTRAMUSCULAR; INTRAVENOUS AS NEEDED
Status: DISCONTINUED | OUTPATIENT
Start: 2024-02-07 | End: 2024-02-07

## 2024-02-07 RX ORDER — SCOLOPAMINE TRANSDERMAL SYSTEM 1 MG/1
1 PATCH, EXTENDED RELEASE TRANSDERMAL ONCE
Status: DISCONTINUED | OUTPATIENT
Start: 2024-02-07 | End: 2024-02-07

## 2024-02-07 RX ORDER — ASPIRIN 325 MG
325 TABLET, DELAYED RELEASE (ENTERIC COATED) ORAL 2 TIMES DAILY
Status: DISCONTINUED | OUTPATIENT
Start: 2024-02-07 | End: 2024-02-08 | Stop reason: HOSPADM

## 2024-02-07 RX ORDER — CYCLOBENZAPRINE HCL 10 MG
10 TABLET ORAL 3 TIMES DAILY PRN
Status: DISCONTINUED | OUTPATIENT
Start: 2024-02-07 | End: 2024-02-08 | Stop reason: HOSPADM

## 2024-02-07 RX ORDER — MIDAZOLAM HYDROCHLORIDE 1 MG/ML
2 INJECTION, SOLUTION INTRAMUSCULAR; INTRAVENOUS ONCE
Status: COMPLETED | OUTPATIENT
Start: 2024-02-07 | End: 2024-02-07

## 2024-02-07 RX ORDER — ATORVASTATIN CALCIUM 20 MG/1
10 TABLET, FILM COATED ORAL DAILY
Status: DISCONTINUED | OUTPATIENT
Start: 2024-02-07 | End: 2024-02-08 | Stop reason: HOSPADM

## 2024-02-07 RX ORDER — DOCUSATE SODIUM 50 MG/5ML
100 LIQUID ORAL 2 TIMES DAILY
Status: DISCONTINUED | OUTPATIENT
Start: 2024-02-07 | End: 2024-02-08 | Stop reason: HOSPADM

## 2024-02-07 RX ORDER — IPRATROPIUM BROMIDE 0.5 MG/2.5ML
500 SOLUTION RESPIRATORY (INHALATION) EVERY 30 MIN PRN
Status: DISCONTINUED | OUTPATIENT
Start: 2024-02-07 | End: 2024-02-07 | Stop reason: HOSPADM

## 2024-02-07 RX ORDER — TALC
3 POWDER (GRAM) TOPICAL NIGHTLY PRN
Status: DISCONTINUED | OUTPATIENT
Start: 2024-02-07 | End: 2024-02-08 | Stop reason: HOSPADM

## 2024-02-07 RX ORDER — ONDANSETRON HYDROCHLORIDE 2 MG/ML
4 INJECTION, SOLUTION INTRAVENOUS ONCE AS NEEDED
Status: DISCONTINUED | OUTPATIENT
Start: 2024-02-07 | End: 2024-02-07 | Stop reason: HOSPADM

## 2024-02-07 RX ORDER — ALBUTEROL SULFATE 0.83 MG/ML
2.5 SOLUTION RESPIRATORY (INHALATION) EVERY 30 MIN PRN
Status: DISCONTINUED | OUTPATIENT
Start: 2024-02-07 | End: 2024-02-07 | Stop reason: HOSPADM

## 2024-02-07 RX ORDER — APREPITANT 40 MG/1
40 CAPSULE ORAL ONCE
Status: COMPLETED | OUTPATIENT
Start: 2024-02-07 | End: 2024-02-07

## 2024-02-07 RX ADMIN — MIDAZOLAM 2 MG: 1 INJECTION INTRAMUSCULAR; INTRAVENOUS at 10:06

## 2024-02-07 RX ADMIN — DEXAMETHASONE SODIUM PHOSPHATE 4 MG: 4 INJECTION, SOLUTION INTRAMUSCULAR; INTRAVENOUS at 10:40

## 2024-02-07 RX ADMIN — BENZOCAINE AND MENTHOL 1 LOZENGE: 15; 3.6 LOZENGE ORAL at 21:45

## 2024-02-07 RX ADMIN — Medication 100 MCG: at 10:34

## 2024-02-07 RX ADMIN — NEOSTIGMINE METHYLSULFATE 1.5 MG: 1 INJECTION INTRAVENOUS at 12:00

## 2024-02-07 RX ADMIN — PROPOFOL 30 MG: 10 INJECTION, EMULSION INTRAVENOUS at 10:25

## 2024-02-07 RX ADMIN — PROPOFOL 20 MG: 10 INJECTION, EMULSION INTRAVENOUS at 10:24

## 2024-02-07 RX ADMIN — DOCUSATE SODIUM 100 MG: 50 LIQUID ORAL at 21:42

## 2024-02-07 RX ADMIN — GLYCOPYRROLATE 0.2 MG: 0.2 INJECTION INTRAMUSCULAR; INTRAVENOUS at 11:58

## 2024-02-07 RX ADMIN — SODIUM CHLORIDE, SODIUM LACTATE, POTASSIUM CHLORIDE, AND CALCIUM CHLORIDE 100 ML/HR: 600; 310; 30; 20 INJECTION, SOLUTION INTRAVENOUS at 09:22

## 2024-02-07 RX ADMIN — ONDANSETRON 4 MG: 2 INJECTION INTRAMUSCULAR; INTRAVENOUS at 11:57

## 2024-02-07 RX ADMIN — Medication 2 L/MIN: at 13:45

## 2024-02-07 RX ADMIN — Medication 0.3 MCG/KG/MIN: at 10:38

## 2024-02-07 RX ADMIN — SCOPALAMINE 1 PATCH: 1 PATCH, EXTENDED RELEASE TRANSDERMAL at 09:22

## 2024-02-07 RX ADMIN — TRANEXAMIC ACID 1000 MG: 1 INJECTION, SOLUTION INTRAVENOUS at 10:49

## 2024-02-07 RX ADMIN — Medication 100 MCG: at 11:10

## 2024-02-07 RX ADMIN — ACETAMINOPHEN 650 MG: 325 TABLET ORAL at 18:28

## 2024-02-07 RX ADMIN — NEOSTIGMINE METHYLSULFATE 1.5 MG: 1 INJECTION INTRAVENOUS at 11:58

## 2024-02-07 RX ADMIN — Medication 100 MCG: at 10:31

## 2024-02-07 RX ADMIN — ASPIRIN 325 MG: 325 TABLET, COATED ORAL at 21:42

## 2024-02-07 RX ADMIN — ACETAMINOPHEN 650 MG: 325 TABLET ORAL at 14:40

## 2024-02-07 RX ADMIN — PROPOFOL 150 MG: 10 INJECTION, EMULSION INTRAVENOUS at 10:22

## 2024-02-07 RX ADMIN — CEFAZOLIN SODIUM 2 G: 2 INJECTION, SOLUTION INTRAVENOUS at 10:25

## 2024-02-07 RX ADMIN — ESMOLOL HYDROCHLORIDE 20 MG: 100 INJECTION, SOLUTION INTRAVENOUS at 12:07

## 2024-02-07 RX ADMIN — ROCURONIUM BROMIDE 50 MG: 10 INJECTION, SOLUTION INTRAVENOUS at 10:22

## 2024-02-07 RX ADMIN — Medication 100 MCG: at 10:45

## 2024-02-07 RX ADMIN — ESMOLOL HYDROCHLORIDE 20 MG: 100 INJECTION, SOLUTION INTRAVENOUS at 10:25

## 2024-02-07 RX ADMIN — FENTANYL CITRATE 50 MCG: 0.05 INJECTION, SOLUTION INTRAMUSCULAR; INTRAVENOUS at 10:06

## 2024-02-07 RX ADMIN — APREPITANT 40 MG: 40 CAPSULE ORAL at 09:22

## 2024-02-07 RX ADMIN — CEFAZOLIN SODIUM 2 G: 2 INJECTION, SOLUTION INTRAVENOUS at 18:30

## 2024-02-07 RX ADMIN — ATORVASTATIN CALCIUM 10 MG: 20 TABLET, FILM COATED ORAL at 14:40

## 2024-02-07 RX ADMIN — LIDOCAINE HYDROCHLORIDE 3.5 ML: 10 INJECTION, SOLUTION EPIDURAL; INFILTRATION; INTRACAUDAL; PERINEURAL at 10:22

## 2024-02-07 RX ADMIN — GLYCOPYRROLATE 0.2 MG: 0.2 INJECTION INTRAMUSCULAR; INTRAVENOUS at 12:00

## 2024-02-07 RX ADMIN — SODIUM CHLORIDE, SODIUM LACTATE, POTASSIUM CHLORIDE, AND CALCIUM CHLORIDE 100 ML/HR: 600; 310; 30; 20 INJECTION, SOLUTION INTRAVENOUS at 14:40

## 2024-02-07 RX ADMIN — ESMOLOL HYDROCHLORIDE 30 MG: 100 INJECTION, SOLUTION INTRAVENOUS at 11:55

## 2024-02-07 RX ADMIN — ACETAMINOPHEN 650 MG: 325 TABLET ORAL at 23:55

## 2024-02-07 SDOH — ECONOMIC STABILITY: HOUSING INSECURITY

## 2024-02-07 SDOH — ECONOMIC STABILITY: FOOD INSECURITY: WITHIN THE PAST 12 MONTHS, THE FOOD YOU BOUGHT JUST DIDN'T LAST AND YOU DIDN'T HAVE MONEY TO GET MORE.: NEVER TRUE

## 2024-02-07 SDOH — ECONOMIC STABILITY: INCOME INSECURITY: IN THE LAST 12 MONTHS, WAS THERE A TIME WHEN YOU WERE NOT ABLE TO PAY THE MORTGAGE OR RENT ON TIME?: NO

## 2024-02-07 SDOH — ECONOMIC STABILITY: FOOD INSECURITY

## 2024-02-07 SDOH — ECONOMIC STABILITY: FOOD INSECURITY: WITHIN THE PAST 12 MONTHS, YOU WORRIED THAT YOUR FOOD WOULD RUN OUT BEFORE YOU GOT MONEY TO BUY MORE.: NEVER TRUE

## 2024-02-07 SDOH — SOCIAL STABILITY: SOCIAL INSECURITY: WERE YOU ABLE TO COMPLETE ALL THE BEHAVIORAL HEALTH SCREENINGS?: YES

## 2024-02-07 SDOH — ECONOMIC STABILITY: TRANSPORTATION INSECURITY
IN THE PAST 12 MONTHS, HAS LACK OF TRANSPORTATION KEPT YOU FROM MEETINGS, WORK, OR FROM GETTING THINGS NEEDED FOR DAILY LIVING?: NO

## 2024-02-07 SDOH — ECONOMIC STABILITY: TRANSPORTATION INSECURITY: IN THE PAST 12 MONTHS, HAS LACK OF TRANSPORTATION KEPT YOU FROM MEDICAL APPOINTMENTS OR FROM GETTING MEDICATIONS?: NO

## 2024-02-07 SDOH — ECONOMIC STABILITY: FOOD INSECURITY: WITHIN THE PAST 12 MONTHS, YOU WORRIED THAT YOUR FOOD WOULD RUN OUT BEFORE YOU GOT THE MONEY TO BUY MORE.: NEVER TRUE

## 2024-02-07 SDOH — SOCIAL STABILITY: SOCIAL INSECURITY: DO YOU FEEL UNSAFE GOING BACK TO THE PLACE WHERE YOU ARE LIVING?: NO

## 2024-02-07 SDOH — ECONOMIC STABILITY: HOUSING INSECURITY: IN THE LAST 12 MONTHS, HOW MANY PLACES HAVE YOU LIVED?: 1

## 2024-02-07 SDOH — HEALTH STABILITY: MENTAL HEALTH: CURRENT SMOKER: 0

## 2024-02-07 SDOH — SOCIAL STABILITY: SOCIAL INSECURITY: HAVE YOU HAD THOUGHTS OF HARMING ANYONE ELSE?: NO

## 2024-02-07 SDOH — ECONOMIC STABILITY: HOUSING INSECURITY: IN THE PAST 12 MONTHS HAS THE ELECTRIC, GAS, OIL, OR WATER COMPANY THREATENED TO SHUT OFF SERVICES IN YOUR HOME?: NO

## 2024-02-07 SDOH — SOCIAL STABILITY: SOCIAL INSECURITY: DOES ANYONE TRY TO KEEP YOU FROM HAVING/CONTACTING OTHER FRIENDS OR DOING THINGS OUTSIDE YOUR HOME?: NO

## 2024-02-07 SDOH — SOCIAL STABILITY: SOCIAL INSECURITY: ARE THERE ANY APPARENT SIGNS OF INJURIES/BEHAVIORS THAT COULD BE RELATED TO ABUSE/NEGLECT?: NO

## 2024-02-07 SDOH — ECONOMIC STABILITY: HOUSING INSECURITY: IN THE LAST 12 MONTHS, WAS THERE A TIME WHEN YOU WERE NOT ABLE TO PAY THE MORTGAGE OR RENT ON TIME?: NO

## 2024-02-07 SDOH — SOCIAL STABILITY: SOCIAL INSECURITY: DO YOU FEEL ANYONE HAS EXPLOITED OR TAKEN ADVANTAGE OF YOU FINANCIALLY OR OF YOUR PERSONAL PROPERTY?: NO

## 2024-02-07 SDOH — ECONOMIC STABILITY: HOUSING INSECURITY
IN THE LAST 12 MONTHS, WAS THERE A TIME WHEN YOU DID NOT HAVE A STEADY PLACE TO SLEEP OR SLEPT IN A SHELTER (INCLUDING NOW)?: NO

## 2024-02-07 SDOH — ECONOMIC STABILITY: FOOD INSECURITY: WITHIN THE PAST 12 MONTHS, THE FOOD YOU BOUGHT JUST DIDN’T LAST AND YOU DIDN’T HAVE MONEY TO GET MORE.: NEVER TRUE

## 2024-02-07 SDOH — SOCIAL STABILITY: SOCIAL INSECURITY: ARE YOU OR HAVE YOU BEEN THREATENED OR ABUSED PHYSICALLY, EMOTIONALLY, OR SEXUALLY BY ANYONE?: NO

## 2024-02-07 SDOH — ECONOMIC STABILITY: TRANSPORTATION INSECURITY

## 2024-02-07 SDOH — ECONOMIC STABILITY: GENERAL

## 2024-02-07 SDOH — ECONOMIC STABILITY: TRANSPORTATION INSECURITY
IN THE PAST 12 MONTHS, HAS THE LACK OF TRANSPORTATION KEPT YOU FROM MEDICAL APPOINTMENTS OR FROM GETTING MEDICATIONS?: NO

## 2024-02-07 SDOH — SOCIAL STABILITY: SOCIAL INSECURITY: ABUSE: ADULT

## 2024-02-07 SDOH — SOCIAL STABILITY: SOCIAL INSECURITY: HAS ANYONE EVER THREATENED TO HURT YOUR FAMILY OR YOUR PETS?: NO

## 2024-02-07 ASSESSMENT — ACTIVITIES OF DAILY LIVING (ADL)
ASSISTIVE_DEVICE: SLING LUE
LACK_OF_TRANSPORTATION: NO
FEEDING YOURSELF: INDEPENDENT
JUDGMENT_ADEQUATE_SAFELY_COMPLETE_DAILY_ACTIVITIES: YES
PATIENT'S MEMORY ADEQUATE TO SAFELY COMPLETE DAILY ACTIVITIES?: YES
HEARING - RIGHT EAR: HEARING AID
BATHING: INDEPENDENT
ADEQUATE_TO_COMPLETE_ADL: YES
GROOMING: INDEPENDENT
TOILETING: INDEPENDENT
WALKS IN HOME: INDEPENDENT
HEARING - LEFT EAR: COCHLEAR IMPLANT
LACK_OF_TRANSPORTATION: NO
DRESSING YOURSELF: INDEPENDENT

## 2024-02-07 ASSESSMENT — COGNITIVE AND FUNCTIONAL STATUS - GENERAL
HELP NEEDED FOR BATHING: A LITTLE
STANDING UP FROM CHAIR USING ARMS: A LITTLE
DRESSING REGULAR LOWER BODY CLOTHING: TOTAL
STANDING UP FROM CHAIR USING ARMS: A LOT
DRESSING REGULAR UPPER BODY CLOTHING: A LITTLE
CLIMB 3 TO 5 STEPS WITH RAILING: A LITTLE
MOVING TO AND FROM BED TO CHAIR: A LITTLE
DAILY ACTIVITIY SCORE: 23
DRESSING REGULAR LOWER BODY CLOTHING: A LITTLE
MOBILITY SCORE: 22
TURNING FROM BACK TO SIDE WHILE IN FLAT BAD: A LITTLE
MOVING FROM LYING ON BACK TO SITTING ON SIDE OF FLAT BED WITH BEDRAILS: A LITTLE
PATIENT BASELINE BEDBOUND: NO
DAILY ACTIVITIY SCORE: 17
TOILETING: A LITTLE
MOBILITY SCORE: 19
PERSONAL GROOMING: A LITTLE

## 2024-02-07 ASSESSMENT — PAIN - FUNCTIONAL ASSESSMENT
PAIN_FUNCTIONAL_ASSESSMENT: 0-10
PAIN_FUNCTIONAL_ASSESSMENT: WONG-BAKER FACES
PAIN_FUNCTIONAL_ASSESSMENT: 0-10

## 2024-02-07 ASSESSMENT — COLUMBIA-SUICIDE SEVERITY RATING SCALE - C-SSRS
6. HAVE YOU EVER DONE ANYTHING, STARTED TO DO ANYTHING, OR PREPARED TO DO ANYTHING TO END YOUR LIFE?: NO
2. HAVE YOU ACTUALLY HAD ANY THOUGHTS OF KILLING YOURSELF?: NO
1. IN THE PAST MONTH, HAVE YOU WISHED YOU WERE DEAD OR WISHED YOU COULD GO TO SLEEP AND NOT WAKE UP?: NO

## 2024-02-07 ASSESSMENT — PATIENT HEALTH QUESTIONNAIRE - PHQ9
SUM OF ALL RESPONSES TO PHQ9 QUESTIONS 1 & 2: 0
2. FEELING DOWN, DEPRESSED OR HOPELESS: NOT AT ALL
1. LITTLE INTEREST OR PLEASURE IN DOING THINGS: NOT AT ALL

## 2024-02-07 ASSESSMENT — PAIN SCALES - GENERAL
PAINLEVEL_OUTOF10: 0 - NO PAIN
PAINLEVEL_OUTOF10: 4
PAINLEVEL_OUTOF10: 0 - NO PAIN

## 2024-02-07 ASSESSMENT — LIFESTYLE VARIABLES
AUDIT-C TOTAL SCORE: 0
HOW MANY STANDARD DRINKS CONTAINING ALCOHOL DO YOU HAVE ON A TYPICAL DAY: PATIENT DOES NOT DRINK
AUDIT-C TOTAL SCORE: 0
HOW OFTEN DO YOU HAVE A DRINK CONTAINING ALCOHOL: NEVER
SKIP TO QUESTIONS 9-10: 1
HOW OFTEN DO YOU HAVE 6 OR MORE DRINKS ON ONE OCCASION: NEVER

## 2024-02-07 ASSESSMENT — SOCIAL DETERMINANTS OF HEALTH (SDOH): IN THE PAST 12 MONTHS, HAS THE ELECTRIC, GAS, OIL, OR WATER COMPANY THREATENED TO SHUT OFF SERVICE IN YOUR HOME?: NO

## 2024-02-07 ASSESSMENT — PAIN DESCRIPTION - DESCRIPTORS: DESCRIPTORS: ACHING;STABBING

## 2024-02-07 NOTE — NURSING NOTE
Assumed care of pt. Pt resting in bed with call light in reach. Sling and ice pack in place. 2+ pulses and cap refill brisk. Pt c/o numbness and tingling in LUE. Denies any pain or needs at this time.

## 2024-02-07 NOTE — PERIOPERATIVE NURSING NOTE
Pt awake, reoriented, hearing aid placed on left ear. Pt follows commands, repositioned.  Pt appropriate, xray @ bedside.

## 2024-02-07 NOTE — ANESTHESIA PREPROCEDURE EVALUATION
Patient: Klever Moran    Procedure Information       Date/Time: 02/07/24 1030    Procedure: Arthroplasty Reverse Shoulder (ARTHREX) (Left: Arm Upper)    Location: [facility] / [facility]    Surgeons: Josué Blanco MD          Past Medical History:   Diagnosis Date    Arthritis 01 /01/2000    Cancer (CMS/HCC) 01/01/2000    Encounter for screening for cardiovascular disorders 10/08/2019    Screening for abdominal aortic aneurysm    Encounter for screening for cardiovascular disorders 02/02/2021    Screening, heart disease, ischemic    GERD (gastroesophageal reflux disease)     Hyperlipidemia     Hypertension     Injury of conjunctiva and corneal abrasion without foreign body, left eye, initial encounter 04/16/2021    Abrasion of left cornea, initial encounter    Occipital neuralgia 09/12/2018    Occipital neuralgia of right side    Other chest pain 04/16/2021    Chest pain, atypical    Other conditions influencing health status     Adverse Reaction To Anesthesia    Other malaise 04/16/2021    Malaise and fatigue    Personal history of other diseases of the musculoskeletal system and connective tissue 04/16/2021    History of neck pain    Personal history of other diseases of the nervous system and sense organs     History of hearing loss    Personal history of other specified conditions 06/05/2017    History of paresthesia    Personal history of other specified conditions 06/24/2020    History of weakness    PONV (postoperative nausea and vomiting)     Post laminectomy syndrome     Radiculopathy, lumbar region 09/10/2020    Lumbar radiculitis    Superficial foreign body of unspecified hand, initial encounter 04/16/2021    Acute foreign body of hand    Unspecified hearing loss, right ear 04/16/2021    Hearing loss of right ear, unspecified hearing loss type     Past Surgical History:   Procedure Laterality Date    ARTHROPLASTY Right 2010    Right thumb arthroplasty    ARTHROPLASTY Left 2011    Left thumb  arthroplasty    BACK SURGERY  09/03/2015    CATARACT EXTRACTION Bilateral 2016    CERVICAL FUSION  2021    C3-C7    COCHLEAR IMPLANT  11/11/2022    COLONOSCOPY      2011, 2018    HAND SURGERY  07/08/2013    Hand Surgery                                                                                                                                                          HERNIA REPAIR  2009    Hernia Repair    JOINT REPLACEMENT  02/24/2022    KNEE ARTHROPLASTY  2022    KNEE SURGERY  2009    Knee Surgery Right    ROTATOR CUFF REPAIR Right     2012, 2013    SPINAL FUSION  2015    L4-S1 fusion         Relevant Problems   Anesthesia   (+) Abrasion of left cornea   (+) PONV (postoperative nausea and vomiting)      Cardiovascular   (+) Chest pain, pleuritic   (+) Combined hyperlipidemia   (+) Primary hypertension      GI   (+) GERD (gastroesophageal reflux disease)      Neuro/Psych   (+) Cervical radiculitis      Musculoskeletal   (+) Cervical stenosis of spine   (+) Degenerative arthritis of lumbar spine   (+) Disc degeneration, lumbar   (+) Lumbar stenosis with neurogenic claudication   (+) Myofascial pain syndrome, cervical   (+) Osteoarthritis of left shoulder   (+) Osteoarthritis of wrist   (+) Osteoarthritis, hand   (+) Primary osteoarthritis, left shoulder   (+) Spondylosis of cervical region without myelopathy or radiculopathy      Eyes, Ears, Nose, and Throat   (+) Hearing loss   (+) Sensorineural hearing loss, bilateral      Other   (+) Impingement syndrome of left shoulder       Clinical information reviewed:                   NPO Detail:  No data recorded     Physical Exam    Airway  Mallampati: II  TM distance: >3 FB  Neck ROM: full     Cardiovascular    Dental    Pulmonary    Abdominal            Anesthesia Plan    History of general anesthesia?: yes  History of complications of general anesthesia?: yes    ASA 2     general and regional     The patient is not a current smoker.  Patient was not previously  instructed to abstain from smoking on day of procedure.  Patient did not smoke on day of procedure.  Education provided regarding risk of obstructive sleep apnea.  intravenous induction   Postoperative administration of opioids is intended.  Anesthetic plan and risks discussed with patient.  Use of blood products discussed with patient who consented to blood products.    Plan discussed with CRNA and CAA.

## 2024-02-07 NOTE — ANESTHESIA PROCEDURE NOTES
Peripheral Block    Patient location during procedure: pre-op  Start time: 2/7/2024 10:09 AM  End time: 2/7/2024 10:11 AM  Reason for block: at surgeon's request and post-op pain management  Staffing  Performed: attending   Authorized by: Viraj Calixto DO    Performed by: Viraj Calixto DO  Preanesthetic Checklist  Completed: patient identified, IV checked, site marked, risks and benefits discussed, surgical consent, monitors and equipment checked, pre-op evaluation and timeout performed   Timeout performed at: 2/7/2024 10:05 AM  Peripheral Block  Patient position: sitting  Prep: ChloraPrep  Patient monitoring: heart rate, cardiac monitor and continuous pulse ox  Block type: interscalene  Laterality: left  Injection technique: single-shot  Guidance: ultrasound guided  Local infiltration: ropivacaine  Infiltration strength: 0.5 %  Dose: 20 mL  Needle  Needle gauge: 22 G  Needle length: 5 cm  Needle localization: ultrasound guidance  Assessment  Injection assessment: negative aspiration for heme, no paresthesia on injection, incremental injection and local visualized surrounding nerve on ultrasound  Paresthesia pain: none  Heart rate change: no  Slow fractionated injection: yes  Additional Notes  With 4mg Decadron

## 2024-02-07 NOTE — PERIOPERATIVE NURSING NOTE
Pt received from OR via cart, moniotrs on, report received. Pt opens eyes, follows commands. Report received. Operative extremity in sling, ice pack placed.  Remaining assessment completed as documented, orders reviewed, safety maintained.

## 2024-02-07 NOTE — PERIOPERATIVE NURSING NOTE
"Pt awake, Attending anesthesia @ bedside, PONV assessed. Pt states \"much better that it would've been normally\" Pt declined any additional meds for intervention.  Pt tolerating ice chips, VSS, no pain.  "

## 2024-02-07 NOTE — PERIOPERATIVE NURSING NOTE
Pt meets criteria for tx to RNF, reprt given to receiving nurse. Family updated  Pt tx'd to 2nd floor via cart, accompanied by PACU RN.

## 2024-02-07 NOTE — OP NOTE
Arthroplasty Reverse Shoulder (ARTHREX) (L) Operative Note     Date: 2024  OR Location: [facility]    Name: Klever Moran, : 1950, Age: 73 y.o., MRN: [id number], Sex: male    Diagnosis  Pre-op Diagnosis     * Osteoarthritis of left shoulder, unspecified osteoarthritis type [M19.012] Post-op Diagnosis     * Osteoarthritis of left shoulder, unspecified osteoarthritis type [M19.012]     Procedures  Arthroplasty Reverse Shoulder (ARTHREX)  07023 - [place] ARTHROPLASTY GLENOHUMERAL JOINT TOTAL SHOULDER    #1 left Arthrex reverse total shoulder arthroplasty  #2 left shoulder open biceps tenodesis  Surgeons      * Josué Blanco - Primary    Resident/Fellow/Other Assistant:  Surgeon(s) and Role:  Kathy Jamison PA-C  Procedure Summary  Anesthesia: General  ASA: II  Anesthesia Staff: Anesthesiologist: Viraj Calixto DO  C-AA: DERICK Rodriguez  Estimated Blood Loss: 50mL  Intra-op Medications:   Administrations occurring from 1030 to 1230 on 24:   Medication Name Total Dose   tranexamic acid (Cyklokapron) injection 2 g   lactated Ringer's infusion Cannot be calculated              Anesthesia Record               Intraprocedure I/O Totals          Intake    Tranexamic Acid 0.00 mL    The total shown is the total volume documented since Anesthesia Start was filed.    Phenylephrine Drip 0.00 mL    The total shown is the total volume documented since Anesthesia Start was filed.    lactated Ringer's infusion 1300.00 mL    ceFAZolin in dextrose (iso-os) (Ancef) IVPB 2 g 100.00 mL    Total Intake 1400 mL       Output    Est. Blood Loss 50 mL    Total Output 50 mL       Net    Net Volume 1350 mL          Specimen: No specimens collected     Staff:   Circulator: Linda Cronin RN  Scrub Person: Annette Medina PA-C; Robby Hough         Drains and/or Catheters: * None in log *    Tourniquet Times:         Implants:  Implants       Type Name Action Serial No.      Joint GLENOID PIN, TARGETER,  2.8MM, STAINLESS - [id number] Implanted      Joint POST, MODULAR CENTRAL, 20MM - [id number] Implanted      Joint BASEPLATE, GLENIOD, MODULAR, 24MM +4 LAT - [id number] Implanted      Screw SCREW, LOCKING, 5.5MM X 28MM - [id number] Implanted      Screw SCREW, LOCKING, 5.5MM X 20MM - [id number] Implanted      Screw SCREW, LOCKING, 5.5MM X 20MM - [id number] Implanted      Screw SCREW, NON-LOCKING, 4.5MM X 32MM - [id number] Implanted      Joint GLENOSPHERE, LATERALIZED, 39+ 4, 24MM BASEPLATE TAPER - [id number] Implanted      Joint STEM, HUMERAL, UNIVERS REVERS, SZ 10 - [id number] Implanted      Joint SUTURE CUP, UNIVERS REVERS, 39, NEUTRAL - [id number] Implanted      Joint SPACER, HUMERAL, 39MM +9MM - [id number] Implanted      Screw INSERT, HUMERAL, M/39 +3, CONST - [id number] Implanted               Findings: Severe arthritis and severe contracture and poor rotator cuff function    Indications: Klever Moran is an 73 y.o. male who is having surgery for Osteoarthritis of left shoulder, unspecified osteoarthritis type [M19.012].  Gentleman has had longstanding pain in the shoulder with deteriorating function.  On preoperative exam today it is difficult for him to get above 80 to 90 degrees he has weakness and pain in external rotation.  We talked about primary versus reverse replacement and with the findings and age and comorbidities, most likely reverse replacement will be performed at the time of surgery with the severe contracture and poor function and thinning especially of the anterior cuff, reverse replacement was performed.  He understands that with reverse replacement there are significant risk such as nerve artery tendon damage infection continued pain stress fracture need for revision surgery and dislocation.  He understands there can be great variability in terms of pain relief and range of motion.  Wish to proceed.    The patient was seen in the preoperative area. The risks, benefits, complications, treatment options,  non-operative alternatives, expected recovery and outcomes were discussed with the patient. The possibilities of reaction to medication, pulmonary aspiration, injury to surrounding structures, bleeding, recurrent infection, the need for additional procedures, failure to diagnose a condition, and creating a complication requiring transfusion or operation were discussed with the patient. The patient concurred with the proposed plan, giving informed consent.  The site of surgery was properly noted/marked if necessary per policy. The patient has been actively warmed in preoperative area. Preoperative antibiotics have been ordered and given within 1 hours of incision. Venous thrombosis prophylaxis have been ordered including bilateral sequential compression devices    Procedure Details: Pleasant gentleman brought to the operating room after sterilely prepping draping and performing timeout with placed the patient in the upright position after sterilely prepping draping and did a standard deltopectoral incision and there was a partial tearing of the cephalic vein that was tied off but otherwise good deltoid good pack to oralis muscle.  At this point we partially raise the pec and did a biceps tenodesis to the lateral edge of the release tendon we had an excellent exposure with capsular release protecting the axillary nerve.  At this point we still the deterioration of the anterior cuff but we did get a good sling of subscapularis with good inset of the humerus we were able to repair this.  We exposed the glenoid with preoperative templating put our baseplate in excellent position used a depth reamer and reamed and removed osteophytes around the baseplate we used +4 on the baseplate and +4 on the glenoid all screws had good purchase good bone quality overall.  Baseplate and glenoid was placed with central screw secured.  We went up to a size 10 on the humerus and used +12 conforming insert and we placed sutures #2  FiberWire through the cup and then went through the outside bone as the implant was impacted we placed our final implant and reduced the shoulder.  We had good subscapularis to repair back onto the edge of the cup and onto the bone with Pelon-Michael sutures.  No undue tension was placed on the shoulder but good stability and good range of motion.  At this point prior to the case we did not IV TXA and we did some topical TXA at the end bleeding was controlled nicely.  We placed 1 g of vancomycin powder throughout the wound.  Wound was dry.  Layered closure performed.  There were no complications.  Kathy Jamison PA-C acted as a surgical assistant during this case and assistance greatly reduced operative time and aided in performance of the case  Complications:  None; patient tolerated the procedure well.    Disposition: PACU - hemodynamically stable.  Condition: stable         Additional Details: 0    Attending Attestation: I performed the procedure.    Josué Blanco  Phone Number: [phone]

## 2024-02-07 NOTE — ANESTHESIA PROCEDURE NOTES
Airway  Date/Time: 2/7/2024 10:24 AM  Urgency: elective      Staffing  Performed: DERICK   Authorized by: Viraj Calixto DO    Performed by: DERICK Rodriguez  Patient location during procedure: OR    Indications and Patient Condition  Indications for airway management: anesthesia  Spontaneous Ventilation: absent  Sedation level: deep  Preoxygenated: yes  Mask difficulty assessment: 1 - vent by mask    Final Airway Details  Final airway type: endotracheal airway      Successful airway: ETT  Cuffed: yes   Successful intubation technique: direct laryngoscopy  Facilitating devices/methods: intubating stylet and cricoid pressure  Blade: Davion  Blade size: #4  ETT size (mm): 7.5  Cormack-Lehane Classification: grade IIa - partial view of glottis  Placement verified by: chest auscultation and capnometry   Cuff volume (mL): 8  Measured from: teeth  ETT to teeth (cm): 22  Number of attempts at approach: 1

## 2024-02-07 NOTE — CARE PLAN
The patient's goals for the shift include  remain pain free     The clinical goals for the shift include remain pain free

## 2024-02-07 NOTE — ANESTHESIA POSTPROCEDURE EVALUATION
Patient: Klever Moran    Procedure Summary       Date: 02/07/24 Room / Location: [facility]    Anesthesia Start: 1016 Anesthesia Stop: 1217    Procedure: Arthroplasty Reverse Shoulder (ARTHREX) (Left: Arm Upper) Diagnosis:       Osteoarthritis of left shoulder, unspecified osteoarthritis type      (Osteoarthritis of left shoulder, unspecified osteoarthritis type [M19.012])    Surgeons: Josué Blanco MD Responsible Provider: Viraj Calixto DO    Anesthesia Type: general, regional ASA Status: 2            Anesthesia Type: general, regional    Vitals Value Taken Time   /94 02/07/24 1221   Temp 36 02/07/24 1221   Pulse 76 02/07/24 1221   Resp 16 02/07/24 1221   SpO2 96 02/07/24 1221       Anesthesia Post Evaluation    Patient location during evaluation: bedside  Patient participation: complete - patient participated  Level of consciousness: awake and alert  Pain management: adequate  Multimodal analgesia pain management approach  Airway patency: patent  Two or more strategies used to mitigate risk of obstructive sleep apnea  Cardiovascular status: acceptable  Respiratory status: acceptable  Hydration status: acceptable  Postoperative Nausea and Vomiting: none        There were no known notable events for this encounter.

## 2024-02-07 NOTE — INTERVAL H&P NOTE
H&P reviewed. The patient was examined and there are no changes to the H&P.   Cosentyx Counseling:  I discussed with the patient the risks of Cosentyx including but not limited to worsening of Crohn's disease, immunosuppression, allergic reactions and infections.  The patient understands that monitoring is required including a PPD at baseline and must alert us or the primary physician if symptoms of infection or other concerning signs are noted.

## 2024-02-08 ENCOUNTER — APPOINTMENT (OUTPATIENT)
Dept: CARDIOLOGY | Facility: HOSPITAL | Age: 74
End: 2024-02-08
Payer: MEDICARE

## 2024-02-08 VITALS
RESPIRATION RATE: 16 BRPM | DIASTOLIC BLOOD PRESSURE: 80 MMHG | TEMPERATURE: 97 F | HEIGHT: 70 IN | SYSTOLIC BLOOD PRESSURE: 158 MMHG | HEART RATE: 100 BPM | OXYGEN SATURATION: 94 % | WEIGHT: 156.53 LBS | BODY MASS INDEX: 22.41 KG/M2

## 2024-02-08 PROBLEM — Z98.890 PONV (POSTOPERATIVE NAUSEA AND VOMITING): Status: RESOLVED | Noted: 2024-02-07 | Resolved: 2024-02-08

## 2024-02-08 PROBLEM — M19.012 OSTEOARTHRITIS OF LEFT SHOULDER, UNSPECIFIED OSTEOARTHRITIS TYPE: Status: RESOLVED | Noted: 2024-02-07 | Resolved: 2024-02-08

## 2024-02-08 PROBLEM — M19.012 OSTEOARTHRITIS OF LEFT SHOULDER: Status: RESOLVED | Noted: 2023-12-19 | Resolved: 2024-02-08

## 2024-02-08 PROBLEM — R11.2 PONV (POSTOPERATIVE NAUSEA AND VOMITING): Status: RESOLVED | Noted: 2024-02-07 | Resolved: 2024-02-08

## 2024-02-08 LAB
ANION GAP SERPL CALC-SCNC: 13 MMOL/L (ref 10–20)
BUN SERPL-MCNC: 19 MG/DL (ref 6–23)
CALCIUM SERPL-MCNC: 8.9 MG/DL (ref 8.6–10.3)
CHLORIDE SERPL-SCNC: 105 MMOL/L (ref 98–107)
CO2 SERPL-SCNC: 23 MMOL/L (ref 21–32)
CREAT SERPL-MCNC: 1.01 MG/DL (ref 0.5–1.3)
EGFRCR SERPLBLD CKD-EPI 2021: 79 ML/MIN/1.73M*2
ERYTHROCYTE [DISTWIDTH] IN BLOOD BY AUTOMATED COUNT: 12.5 % (ref 11.5–14.5)
GLUCOSE SERPL-MCNC: 128 MG/DL (ref 74–99)
HCT VFR BLD AUTO: 40.8 % (ref 41–52)
HGB BLD-MCNC: 13.6 G/DL (ref 13.5–17.5)
MCH RBC QN AUTO: 32.3 PG (ref 26–34)
MCHC RBC AUTO-ENTMCNC: 33.3 G/DL (ref 32–36)
MCV RBC AUTO: 97 FL (ref 80–100)
NRBC BLD-RTO: ABNORMAL /100{WBCS}
PLATELET # BLD AUTO: 237 X10*3/UL (ref 150–450)
POTASSIUM SERPL-SCNC: 4.2 MMOL/L (ref 3.5–5.3)
RBC # BLD AUTO: 4.21 X10*6/UL (ref 4.5–5.9)
SODIUM SERPL-SCNC: 137 MMOL/L (ref 136–145)
WBC # BLD AUTO: 16.3 X10*3/UL (ref 4.4–11.3)

## 2024-02-08 PROCEDURE — 96361 HYDRATE IV INFUSION ADD-ON: CPT

## 2024-02-08 PROCEDURE — 97530 THERAPEUTIC ACTIVITIES: CPT | Mod: GP

## 2024-02-08 PROCEDURE — 80048 BASIC METABOLIC PNL TOTAL CA: CPT | Performed by: PHYSICIAN ASSISTANT

## 2024-02-08 PROCEDURE — 2500000004 HC RX 250 GENERAL PHARMACY W/ HCPCS (ALT 636 FOR OP/ED): Performed by: PHYSICIAN ASSISTANT

## 2024-02-08 PROCEDURE — 2500000001 HC RX 250 WO HCPCS SELF ADMINISTERED DRUGS (ALT 637 FOR MEDICARE OP): Performed by: PHYSICIAN ASSISTANT

## 2024-02-08 PROCEDURE — 93005 ELECTROCARDIOGRAM TRACING: CPT

## 2024-02-08 PROCEDURE — 96376 TX/PRO/DX INJ SAME DRUG ADON: CPT

## 2024-02-08 PROCEDURE — 51701 INSERT BLADDER CATHETER: CPT

## 2024-02-08 PROCEDURE — 97161 PT EVAL LOW COMPLEX 20 MIN: CPT | Mod: GP

## 2024-02-08 PROCEDURE — 36415 COLL VENOUS BLD VENIPUNCTURE: CPT | Performed by: PHYSICIAN ASSISTANT

## 2024-02-08 PROCEDURE — G0378 HOSPITAL OBSERVATION PER HR: HCPCS

## 2024-02-08 PROCEDURE — 85027 COMPLETE CBC AUTOMATED: CPT | Performed by: PHYSICIAN ASSISTANT

## 2024-02-08 RX ORDER — OXYCODONE HYDROCHLORIDE 10 MG/1
5 TABLET ORAL EVERY 4 HOURS PRN
Qty: 15 TABLET | Refills: 0 | Status: SHIPPED | OUTPATIENT
Start: 2024-02-08 | End: 2024-02-15

## 2024-02-08 RX ORDER — POLYETHYLENE GLYCOL 3350 17 G/17G
17 POWDER, FOR SOLUTION ORAL DAILY
Qty: 30 PACKET | Refills: 0 | Status: SHIPPED | OUTPATIENT
Start: 2024-02-09 | End: 2024-03-10

## 2024-02-08 RX ORDER — TAMSULOSIN HYDROCHLORIDE 0.4 MG/1
0.4 CAPSULE ORAL DAILY
Qty: 7 CAPSULE | Refills: 0 | Status: SHIPPED | OUTPATIENT
Start: 2024-02-09 | End: 2024-02-16

## 2024-02-08 RX ORDER — TAMSULOSIN HYDROCHLORIDE 0.4 MG/1
0.4 CAPSULE ORAL DAILY
Status: DISCONTINUED | OUTPATIENT
Start: 2024-02-08 | End: 2024-02-08 | Stop reason: HOSPADM

## 2024-02-08 RX ORDER — ASPIRIN 325 MG
325 TABLET, DELAYED RELEASE (ENTERIC COATED) ORAL 2 TIMES DAILY
Qty: 15 TABLET | Refills: 0 | Status: SHIPPED | OUTPATIENT
Start: 2024-02-08

## 2024-02-08 RX ORDER — DOCUSATE SODIUM 50 MG/5ML
100 LIQUID ORAL 2 TIMES DAILY
Qty: 100 ML | Refills: 0 | Status: SHIPPED | OUTPATIENT
Start: 2024-02-08 | End: 2024-04-29 | Stop reason: ALTCHOICE

## 2024-02-08 RX ADMIN — OXYCODONE 5 MG: 5 TABLET ORAL at 02:58

## 2024-02-08 RX ADMIN — DOCUSATE SODIUM 100 MG: 50 LIQUID ORAL at 08:36

## 2024-02-08 RX ADMIN — PANTOPRAZOLE SODIUM 20 MG: 20 TABLET, DELAYED RELEASE ORAL at 06:09

## 2024-02-08 RX ADMIN — ACETAMINOPHEN 650 MG: 325 TABLET ORAL at 11:25

## 2024-02-08 RX ADMIN — ATORVASTATIN CALCIUM 10 MG: 20 TABLET, FILM COATED ORAL at 08:24

## 2024-02-08 RX ADMIN — TAMSULOSIN HYDROCHLORIDE 0.4 MG: 0.4 CAPSULE ORAL at 10:04

## 2024-02-08 RX ADMIN — SODIUM CHLORIDE, SODIUM LACTATE, POTASSIUM CHLORIDE, AND CALCIUM CHLORIDE 100 ML/HR: 600; 310; 30; 20 INJECTION, SOLUTION INTRAVENOUS at 02:31

## 2024-02-08 RX ADMIN — ASPIRIN 325 MG: 325 TABLET, COATED ORAL at 08:24

## 2024-02-08 RX ADMIN — POLYETHYLENE GLYCOL 3350 17 G: 17 POWDER, FOR SOLUTION ORAL at 08:25

## 2024-02-08 RX ADMIN — ACETAMINOPHEN 650 MG: 325 TABLET ORAL at 05:31

## 2024-02-08 RX ADMIN — CEFAZOLIN SODIUM 2 G: 2 INJECTION, SOLUTION INTRAVENOUS at 02:31

## 2024-02-08 ASSESSMENT — COGNITIVE AND FUNCTIONAL STATUS - GENERAL
HELP NEEDED FOR BATHING: A LITTLE
DRESSING REGULAR LOWER BODY CLOTHING: A LITTLE
MOVING TO AND FROM BED TO CHAIR: A LITTLE
DAILY ACTIVITIY SCORE: 20
TOILETING: A LITTLE
MOBILITY SCORE: 22
DRESSING REGULAR UPPER BODY CLOTHING: A LITTLE
MOBILITY SCORE: 19
STANDING UP FROM CHAIR USING ARMS: A LITTLE
CLIMB 3 TO 5 STEPS WITH RAILING: A LITTLE
MOVING FROM LYING ON BACK TO SITTING ON SIDE OF FLAT BED WITH BEDRAILS: A LITTLE
TURNING FROM BACK TO SIDE WHILE IN FLAT BAD: A LITTLE
TURNING FROM BACK TO SIDE WHILE IN FLAT BAD: A LITTLE
MOVING FROM LYING ON BACK TO SITTING ON SIDE OF FLAT BED WITH BEDRAILS: A LITTLE

## 2024-02-08 ASSESSMENT — ACTIVITIES OF DAILY LIVING (ADL): ADL_ASSISTANCE: INDEPENDENT

## 2024-02-08 ASSESSMENT — PAIN SCALES - GENERAL
PAINLEVEL_OUTOF10: 3
PAINLEVEL_OUTOF10: 4
PAINLEVEL_OUTOF10: 2

## 2024-02-08 ASSESSMENT — PAIN DESCRIPTION - DESCRIPTORS: DESCRIPTORS: ACHING

## 2024-02-08 ASSESSMENT — PAIN - FUNCTIONAL ASSESSMENT
PAIN_FUNCTIONAL_ASSESSMENT: 0-10

## 2024-02-08 NOTE — NURSING NOTE
Pt bladder scanned at this time by MAKSIM Avila and showed 380mL. Pt attempted to use the restroom and was able to void 125mL. Pt was given scheduled Flomax after attempt and will try again later. No further orders at this time.

## 2024-02-08 NOTE — CARE PLAN
The patient's goals for the shift include  pain control and urinate     The clinical goals for the shift include pain control

## 2024-02-08 NOTE — NURSING NOTE
Pt post void residual was 781, then pt voided 200cc. US showed 581. Dr Garcia aware and pt straight cathed for 500 ml with post US residual of 163. Was difficult to pass catheter through prostate and 14 fr non latex straight cath was used. Note sent to Dr Garcia.

## 2024-02-08 NOTE — NURSING NOTE
Pt up in chair, L arm in sling and iced. Still no feeling nor movement in L arm per pt. Pt [place] but denies any pain. Will continue to monitor

## 2024-02-08 NOTE — NURSING NOTE
Reviewed medications and discharge instructions with patient at the bedside. Pt verbalized understanding. IV discontinued at 1200 by ROME Davidson without incidence. Site free of bleeding. Sling in place. Pt wheeled down via wheelchair by ARLETH Mitchell and assisted into vehicle.

## 2024-02-08 NOTE — DISCHARGE SUMMARY
Discharge Diagnosis  Osteoarthritis of left shoulder    Issues Requiring Follow-Up  2 week post-op     Test Results Pending At Discharge  Pending Labs       No current pending labs.            Hospital Course   Urinary retention    Pertinent Physical Exam At Time of Discharge  Physical Exam  Shoulder wound is clean and dry, no early signs of infection. Axillary nerve sensation intact. Neurovascularly intact distally    Home Medications     Medication List      START taking these medications     aspirin 325 mg EC tablet; Take 1 tablet (325 mg) by mouth 2 times a day.   docusate sodium 50 mg/5 mL oral liquid; Commonly known as: Colace; Take   10 mL (100 mg) by mouth 2 times a day.   oxyCODONE 10 mg immediate release tablet; Commonly known as: Roxicodone;   Take 0.5 tablets (5 mg) by mouth every 4 hours if needed for severe pain   (7 - 10) for up to 7 days.   polyethylene glycol 17 gram packet; Commonly known as: Glycolax,   Miralax; Take 17 g by mouth once daily. Do not start before February 9, 2024.; Start taking on: February 9, 2024   tamsulosin 0.4 mg 24 hr capsule; Commonly known as: Flomax; Take 1   capsule (0.4 mg) by mouth once daily for 7 days. Do not start before   February 9, 2024.; Start taking on: February 9, 2024     CONTINUE taking these medications     acetaminophen 500 mg tablet; Commonly known as: Tylenol   calcium carbonate 600 mg calcium (1,500 mg) tablet   Centrum Silver; Generic drug: multivitamin with minerals iron-free   cyclobenzaprine 10 mg tablet; Commonly known as: Flexeril; Take 1 tablet   (10 mg) by mouth 3 times a day as needed for muscle spasms.   meloxicam 15 mg tablet; Commonly known as: Mobic; Take 1 tablet (15 mg)   by mouth once daily.   omeprazole 20 mg DR capsule; Commonly known as: PriLOSEC; TAKE 1 CAPSULE   BY MOUTH EVERY DAY   rosuvastatin 5 mg tablet; Commonly known as: Crestor; TAKE 1 TABLET BY   MOUTH EVERY DAY   Voltaren Arthritis Pain 1 % gel; Generic drug: diclofenac  sodium     STOP taking these medications     chlorhexidine 0.12 % solution; Commonly known as: Peridex   Drysol Dab-O-Matic 20 % external solution; Generic drug: aluminum   chloride       Outpatient Follow-Up  Future Appointments   Date Time Provider Department Center   3/4/2024  8:00 AM Marianne Barba MD [id number] [facility]   4/29/2024  9:30 AM Howard Wright MD [id number] [facility]       CARINA CordobaC

## 2024-02-08 NOTE — PROGRESS NOTES
Physical Therapy    Physical Therapy Evaluation & Treatment    Patient Name: Klever Moran  MRN: [id number]  Today's Date: 2/8/2024   Time Calculation  Start Time: 0910  Stop Time: 0944  Time Calculation (min): 34 min    Assessment/Plan   PT Assessment  PT Assessment Results: Decreased strength, Impaired balance, Decreased mobility, Orthopedic restrictions, Pain  Rehab Prognosis: Excellent  Evaluation/Treatment Tolerance: Patient tolerated treatment well  Medical Staff Made Aware: Yes  Strengths: Ability to acquire knowledge, Access to adaptive/assistive products, Attitude of self, Capable of completing ADLs semi/independent, Coping skills, Insight into problems, Premorbid level of function, Rehab experience, Support of Caregivers  Barriers to Participation: Comorbidities, Housing layout  End of Session Communication: Bedside nurse  Assessment Comment: PT eval low. Pt presenting to eval with deficits in mobility, balance, and strength on LUE however limited from surgical restrictions post op. Pt understanding of HEP and L shoulder precautions as well as sling usage. PT adjusted sling for fit. PT recommends outpatient PT with assist as needed and educated patient on use of cane for safety  End of Session Patient Position: Up in chair, Alarm off, not on at start of session   IP OR SWING BED PT PLAN  Inpatient or Swing Bed: Inpatient  PT Plan  Treatment/Interventions: Bed mobility, Transfer training, Gait training, Stair training, Balance training, Strengthening, Range of motion, Therapeutic activity, Home exercise program, Positioning  PT Plan: Skilled PT  PT Frequency: BID  PT Discharge Recommendations: Low intensity level of continued care  Equipment Recommended upon Discharge: Straight cane  PT Recommended Transfer Status: Stand by assist  PT - OK to Discharge: Yes      Subjective     General Visit Information:  General  Reason for Referral: pt [place] [facility] [place] for L RTSR by Dr. Blanco.  Past Medical  History Relevant to Rehab: spine surgery, ex smoker, hernia repair, OA, Cancer, HTN, HLD, knee surgery, joint replacements, cervical fusion, gerd, PONV  Family/Caregiver Present: No  Prior to Session Communication: Bedside nurse  Patient Position Received: Bed, 3 rail up  General Comment: cleared by RN and agreeable to session  Home Living:  Home Living  Type of Home: House  Lives With: Spouse  Home Adaptive Equipment: Cane  Home Layout: Stairs to alternate level with rails  Alternate Level Stairs-Number of Steps: 12 steps to bedroom/bathroom with 1 railing  Home Access: Stairs to enter without rails  Entrance Stairs-Number of Steps: 2 steps into home  Home Living Comments: 2 steps to enter kitchen as well  Prior Level of Function:  Prior Function Per Pt/Caregiver Report  Level of [place]: Independent with ADLs and functional transfers  ADL Assistance: Independent  Homemaking Assistance: Independent  Ambulatory Assistance: Independent  Vocational: Retired  Precautions:  Precautions  UE Weight Bearing Status: Left Non-Weight Bearing  Medical Precautions: Fall precautions  Post-Surgical Precautions: Left shoulder precautions  Braces Applied: sling donned; PT adjusted sling for better fit and comfort. Educated on how to place sling on and off.  Vital Signs:       Objective   Pain:  Pain Assessment  Pain Assessment: 0-10  Pain Score: 4  Pain Type: Surgical pain  Pain Location: Shoulder  Pain Orientation: Left  Pain Descriptors: Aching  Pain Interventions: Cold applied, Rest, Repositioned, Elevated  Response to Interventions: continued therapy  Cognition:  Cognition  Overall Cognitive Status: Within Functional Limits    General Assessments:  General Observation  General Observation: dressing dry and intact on L shoulder               Activity Tolerance  Endurance: Endurance does not limit participation in activity    Sensation  Light Touch: No apparent deficits    Coordination  Movements are Fluid and Coordinated:  Yes    Postural Control  Postural Control: Within Functional Limits    Static Sitting Balance  Static Sitting-Balance Support: No upper extremity supported  Static Sitting-Level of Assistance: Independent  Dynamic Sitting Balance  Dynamic Sitting-Balance Support: No upper extremity supported  Dynamic Sitting-Comments: independent    Static Standing Balance  Static Standing-Balance Support: No upper extremity supported  Static Standing-Level of Assistance: Distant supervision  Static Standing-Comment/Number of Minutes: no device  Dynamic Standing Balance  Dynamic Standing-Balance Support: No upper extremity supported  Dynamic Standing-Comments: distant supervision  Functional Assessments:  Bed Mobility  Bed Mobility: Yes  Bed Mobility 1  Bed Mobility 1: Supine to sitting, Long sit, Scooting  Level of Assistance 1: Minimum assistance  Bed Mobility Comments 1: pt able to sit up from supine without assistance from PT but did require min A x1 for scooting to EOB.    Transfers  Transfer: Yes  Transfer 1  Technique 1: Sit to stand, Stand to sit  Transfer Level of Assistance 1: Minimum assistance, Close supervision  Trials/Comments 1: pt transferred from bed without armrest at mi nA x1 and then from recliner with armrests at CSx1 without buckle or lOB however slower to upright position due to protecting LUE. Pt mildly unsteady at standing however stabilized once fully upright    Ambulation/Gait Training  Ambulation/Gait Training Performed: Yes  Ambulation/Gait Training 1  Device 1: No device  Assistance 1: Minimum assistance, Close supervision  Quality of Gait 1: Soft knee(s)  Comments/Distance (ft) 1: pt ambulated 10 feetx2 at DSx1 and then 120 feetx1 with CSx1 with episode of lateral LOB requiring PT to assist min Ax 1 for stabilization. Pt reported feeling dizzy during episode and needed to sit downto rest. RN notified. pt ambulated  additional 120 afterwards with CSx1 and no episodes of LOB. no device used however  educated on use of cane for safety in RUE to improve balance.    Stairs  Stairs: Yes  Stairs  Rails 1: Right  Device 1: No device  Assistance 1: Close supervision  Comment/Number of Steps 1: pt completed 3 steps with 1 railing laterally on R side with RUE. Pt using step to pattern and had no buckle or LOB but did express dizziness was returning. Pt seated to rest for a moment. spo2 on room air 90s and HR 65-69 bpm. RN notified of dizziness.  Extremity/Trunk Assessments:  RUE   RUE : Within Functional Limits  LUE   LUE:  (limited assessment of LUE due to surgical precautions however elbow/wrist/hand mobility atleast 3/5)  RLE   RLE : Within Functional Limits  Treatments:  Therapeutic Exercise  Therapeutic Exercise Performed: Yes  Therapeutic Exercise Activity 2: hand open close x10  Therapeutic Exercise Activity 3: wrist flexion/extension x10  Therapeutic Exercise Activity 4: supination/pronation x10  Therapeutic Exercise Activity 5: elbow flexion/extensionx 10    Therapeutic Activity  Therapeutic Activity Performed: Yes  Therapeutic Activity 1: pt toileted without PT assistance. Pt able to sit and stand to urinate without LOB or buckling. PT provided DS for activity however no hand son assistance. Pt able to complete hygiene without assist as well.  Outcome Measures:  [facility] Basic Mobility  Turning from your back to your side while in a flat bed without using bedrails: A little  Moving from lying on your back to sitting on the side of a flat bed without using bedrails: A little  Moving to and from bed to chair (including a wheelchair): None  Standing up from a chair using your arms (e.g. wheelchair or bedside chair): None  To walk in hospital room: None  Climbing 3-5 steps with railing: None  Basic Mobility - Total Score: 22        Education Documentation  Handouts, taught by Maria Eugenia Cosme PT at 2/8/2024  2:20 PM.  Learner: Patient  Readiness: Acceptance  Method: Explanation, Demonstration, Handout  Response:  Verbalizes Understanding, Demonstrated Understanding    Precautions, taught by Maria Eugenia Cosme PT at 2/8/2024  2:20 PM.  Learner: Patient  Readiness: Acceptance  Method: Explanation, Demonstration, Handout  Response: Verbalizes Understanding, Demonstrated Understanding    Body Mechanics, taught by Maria Eugenia Cosme PT at 2/8/2024  2:20 PM.  Learner: Patient  Readiness: Acceptance  Method: Explanation, Demonstration, Handout  Response: Verbalizes Understanding, Demonstrated Understanding    Home Exercise Program, taught by Maria Eugenia Cosme PT at 2/8/2024  2:20 PM.  Learner: Patient  Readiness: Acceptance  Method: Explanation, Demonstration, Handout  Response: Verbalizes Understanding, Demonstrated Understanding    Mobility Training, taught by Maria Eugenia Cosme PT at 2/8/2024  2:20 PM.  Learner: Patient  Readiness: Acceptance  Method: Explanation, Demonstration, Handout  Response: Verbalizes Understanding, Demonstrated Understanding    Education Comments  No comments found.    Maria Eugenia Cosme, PT, DPT

## 2024-02-08 NOTE — PROGRESS NOTES
"Klever Moran is a 73 y.o. male on day 0 of admission presenting with Osteoarthritis of left shoulder.    Subjective   Doing well overall, pain under control and nerve block wearing off. Having issues with urinary retention, did need straight cath this morning. Will need to limit narcotic use post-op to avoid further urinary retention issues. Ok to dc from ortho standpoint if cleared by medicine.        Objective     Physical Exam  Shoulder wound is clean and dry, able to move wrist and digits. Axillary nerve sensation intact. Good pulses and sensation in the upper extremity,    Last Recorded Vitals  Blood pressure 158/80, pulse 100, temperature 36.1 °C (97 °F), temperature source Temporal, resp. rate 16, height 1.778 m (5' 10\"), weight 71 kg (156 lb 8.4 oz), SpO2 94 %.  Intake/Output last 3 Shifts:  I/O last 3 completed shifts:  In: 2073.3 (29.2 mL/kg) [P.O.:320; I.V.:1653.3 (23.3 mL/kg); IV Piggyback:100]  Out: 1175 (16.5 mL/kg) [Urine:1125 (0.4 mL/kg/hr); Blood:50]  Weight: 71 kg     Relevant Results      Scheduled medications  acetaminophen, 650 mg, oral, q6h NIYA  aspirin, 325 mg, oral, BID  atorvastatin, 10 mg, oral, Daily  docusate sodium, 100 mg, oral, BID  pantoprazole, 20 mg, oral, Daily before breakfast  polyethylene glycol, 17 g, oral, Daily      Continuous medications  lactated Ringer's, 100 mL/hr, Last Rate: Stopped (02/08/24 0400)  oxygen, 2 L/min      PRN medications  PRN medications: alum-mag hydroxide-simeth, benzocaine-menthol, cyclobenzaprine, HYDROmorphone, lubricating eye drops, melatonin, naloxone, ondansetron ODT **OR** ondansetron, oxyCODONE, oxyCODONE, simethicone, sodium chloride  Results for orders placed or performed during the hospital encounter of 02/07/24 (from the past 24 hour(s))   CBC   Result Value Ref Range    WBC 16.3 (H) 4.4 - 11.3 x10*3/uL    nRBC      RBC 4.21 (L) 4.50 - 5.90 x10*6/uL    Hemoglobin 13.6 13.5 - 17.5 g/dL    Hematocrit 40.8 (L) 41.0 - 52.0 %    MCV 97 80 - " 100 fL    MCH 32.3 26.0 - 34.0 pg    MCHC 33.3 32.0 - 36.0 g/dL    RDW 12.5 11.5 - 14.5 %    Platelets 237 150 - 450 x10*3/uL   Basic metabolic panel   Result Value Ref Range    Glucose 128 (H) 74 - 99 mg/dL    Sodium 137 136 - 145 mmol/L    Potassium 4.2 3.5 - 5.3 mmol/L    Chloride 105 98 - 107 mmol/L    Bicarbonate 23 21 - 32 mmol/L    Anion Gap 13 10 - 20 mmol/L    Urea Nitrogen 19 6 - 23 mg/dL    Creatinine 1.01 0.50 - 1.30 mg/dL    eGFR 79 >60 mL/min/1.73m*2    Calcium 8.9 8.6 - 10.3 mg/dL                Assessment/Plan   Principal Problem:    Osteoarthritis of left shoulder  Active Problems:    PONV (postoperative nausea and vomiting)    Osteoarthritis of left shoulder, unspecified osteoarthritis type    Ok from ortho to dc if medically stable and able to void or have urology follow up. Will need to limit post-op narcotics due to urinary retention issues. Will be shown home exercises and can follow up with us in 2 weeks as an outpt.            I spent 15 minutes in the professional and overall care of this patient.      Kathy Jamison PA-C

## 2024-02-08 NOTE — NURSING NOTE
Assumed care of patient from nightshift, RN. Pt resting in bed with call light in reach. Pt states pain is 3/10 and tolerable. Sling in place and surgical arm elevated on  pillow. Cap refill brisk. No states needs at this time.

## 2024-02-08 NOTE — CARE PLAN
TCC met with patient at the bedside during MDR to discuss care/discharge plan.  Pt POD#1 left shoulder repair with Dr. Blanco.  Pt feeling much better, pain tolerated well.   Given flomax to assist with urine retention.  Plan is home today with no skilled needs.

## 2024-02-08 NOTE — CONSULTS
Inpatient consult to Medicine  Consult performed by: Cele Garcia MD  Consult ordered by: Kathy Jamison PA-C          Reason For Consult  Medical management of GERD.    History Of Present Illness  Klever Moran is a 73 y.o. male who is postop day 0 status post left reverse total shoulder arthroplasty.  States that his block is still in effect and he cannot move his fingers and has no pain.  Vital signs have been stable postoperatively.  He has voided multiple times and is tolerating p.o. intake.  No acute complaints.     Past Medical History  He has a past medical history of Arthritis (01 /01/2000), Cancer (CMS/[facility]) (01/01/2000), Cochlear implant in place, Encounter for screening for cardiovascular disorders (10/08/2019), Encounter for screening for cardiovascular disorders (02/02/2021), GERD (gastroesophageal reflux disease), Hyperlipidemia, Hypertension, Injury of conjunctiva and corneal abrasion without foreign body, left eye, initial encounter (04/16/2021), Occipital neuralgia (09/12/2018), Other chest pain (04/16/2021), Other conditions influencing health status, Other malaise (04/16/2021), Personal history of other diseases of the musculoskeletal system and connective tissue (04/16/2021), Personal history of other diseases of the nervous system and sense organs, Personal history of other specified conditions (06/05/2017), Personal history of other specified conditions (06/24/2020), PONV (postoperative nausea and vomiting), Post laminectomy syndrome, Radiculopathy, lumbar region (09/10/2020), Superficial foreign body of unspecified hand, initial encounter (04/16/2021), and Unspecified hearing loss, right ear (04/16/2021).    Surgical History  He has a past surgical history that includes Hernia repair (2009); Knee surgery (2009); Hand surgery (07/08/2013); Cochlear implant (11/11/2022); Arthroplasty (Right, 2010); Arthroplasty (Left, 2011); Colonoscopy; Rotator cuff repair (Right); Spinal fusion  (2015); Cataract extraction (Bilateral, 2016); Cervical fusion (2021); Knee Arthroplasty (2022); Back surgery (09/03/2015); and Joint replacement (02/24/2022).     Social History  He reports that he quit smoking about 47 years ago. His smoking use included cigarettes. He started smoking about 57 years ago. He has a 30.00 pack-year smoking history. He has never used smokeless tobacco. He reports that he does not currently use alcohol. He reports that he does not use drugs.    Family History  Family History   Problem Relation Name Age of Onset    Arthritis Mother Mom     Cancer Mother Mom     Hearing loss Father Father         Allergies  Patient has no known allergies.    Review of Systems   All other systems reviewed and are negative.       Physical Exam  Constitutional:       General: He is not in acute distress.     Appearance: Normal appearance.   HENT:      Head: Normocephalic and atraumatic.      Nose: Nose normal.      Mouth/Throat:      Mouth: Mucous membranes are moist.      Pharynx: Oropharynx is clear.   Cardiovascular:      Rate and Rhythm: Normal rate and regular rhythm.   Pulmonary:      Effort: Pulmonary effort is normal. No respiratory distress.      Breath sounds: Normal breath sounds.   Abdominal:      General: Bowel sounds are normal.      Palpations: Abdomen is soft.      Tenderness: There is no abdominal tenderness. There is no rebound.   Musculoskeletal:         General: No swelling, deformity or signs of injury.      Cervical back: Normal range of motion and neck supple.      Comments: Left arm in sling   Skin:     General: Skin is warm and dry.   Neurological:      General: No focal deficit present.      Mental Status: He is alert and oriented to person, place, and time. Mental status is at baseline.   Psychiatric:         Attention and Perception: Attention and perception normal.         Mood and Affect: Mood normal.         Behavior: Behavior normal.         Judgment: Judgment normal.         "  Last Recorded Vitals  Blood pressure 112/68, pulse 79, temperature 36.1 °C (97 °F), temperature source Temporal, resp. rate 16, height 1.778 m (5' 10\"), weight 71 kg (156 lb 8.4 oz), SpO2 96 %.    Relevant Results  XR shoulder left 1 view    Result Date: 2/7/2024  Interpreted By:  Luke Sanchez, STUDY: XR SHOULDER LEFT 1 VIEW; ;  2/7/2024 12:32 pm   INDICATION: Signs/Symptoms:post-op please take in PACU.   COMPARISON: December 2023   ACCESSION NUMBER(S): [id number]   ORDERING CLINICIAN: JUSTINE NGUYỄN   FINDINGS: There is interval placement of reversed total left shoulder arthroplasty. The hardware is intact. No acute fracture is seen. There is no lucency around the hardware to suggest loosening. The alignment is anatomic. Surgical staples overlie the soft tissues.       Intact left shoulder arthroplasty without acute fracture.   Signed by: Luke Sanchez 2/7/2024 1:04 PM Dictation workstation:   [id number]             Assessment/Plan     Left shoulder osteoarthritis  Management per primary  PT/OT  Pain control, bowel regimen  Antibiotic and DVT prophylaxis per primary  GERD  Continue PPI  History of hypertension  Blood pressure is well-controlled off any medications  Continue to monitor              "

## 2024-02-08 NOTE — PROGRESS NOTES
Interdisciplinary Rounds were completed at the bedside with Patient.  Staff participating in rounds included: Clinical Nurse Orthopedic Coordinator Transitional Care Coordinator Director of Nursing/Assistant Nurse Manager Hospitalist MD/PA Physical Therapist.  Topics discussed included: Today's Plan of Care Discharge Plan and Accommodations Physical Therapy Medications/Preferred Pharmacy and the Patient was given the opportunity to ask additional questions or bring up any concerns at that time.  During the final discharge discussion and review of instructions, they will have another opportunity to review questions or concerns prior to leaving our care.  Patient was given information on who to call post-discharge should new questions or concerns arise.      The patient's plan includes:    Discharge Date/Disposition:  Home, Today with, No Services Necessary  Discharge Needs: No Equipment Needs Identified  Medications/Pharmacy: Patient will  discharge prescriptions at primary pharmacy on file

## 2024-02-09 ASSESSMENT — PAIN SCALES - GENERAL: PAINLEVEL_OUTOF10: 0 - NO PAIN

## 2024-02-16 ENCOUNTER — TELEPHONE (OUTPATIENT)
Dept: ORTHOPEDIC SURGERY | Facility: CLINIC | Age: 74
End: 2024-02-16
Payer: MEDICARE

## 2024-02-16 NOTE — TELEPHONE ENCOUNTER
Had surgery on the 7th he has not got a rash. Its on the left arm just below his incision down to the wrist and under the arm pit.     [facility] in [place]

## 2024-02-20 ENCOUNTER — HOSPITAL ENCOUNTER (OUTPATIENT)
Dept: RADIOLOGY | Facility: CLINIC | Age: 74
Discharge: HOME | End: 2024-02-20
Payer: MEDICARE

## 2024-02-20 ENCOUNTER — OFFICE VISIT (OUTPATIENT)
Dept: ORTHOPEDIC SURGERY | Facility: CLINIC | Age: 74
End: 2024-02-20
Payer: MEDICARE

## 2024-02-20 DIAGNOSIS — M19.012 OSTEOARTHRITIS OF LEFT SHOULDER, UNSPECIFIED OSTEOARTHRITIS TYPE: ICD-10-CM

## 2024-02-20 PROCEDURE — 1036F TOBACCO NON-USER: CPT | Performed by: ORTHOPAEDIC SURGERY

## 2024-02-20 PROCEDURE — 1160F RVW MEDS BY RX/DR IN RCRD: CPT | Performed by: ORTHOPAEDIC SURGERY

## 2024-02-20 PROCEDURE — 73030 X-RAY EXAM OF SHOULDER: CPT | Mod: LT

## 2024-02-20 PROCEDURE — 73030 X-RAY EXAM OF SHOULDER: CPT | Mod: LEFT SIDE | Performed by: RADIOLOGY

## 2024-02-20 PROCEDURE — 1125F AMNT PAIN NOTED PAIN PRSNT: CPT | Performed by: ORTHOPAEDIC SURGERY

## 2024-02-20 PROCEDURE — 1157F ADVNC CARE PLAN IN RCRD: CPT | Performed by: ORTHOPAEDIC SURGERY

## 2024-02-20 PROCEDURE — 99024 POSTOP FOLLOW-UP VISIT: CPT | Performed by: ORTHOPAEDIC SURGERY

## 2024-02-20 PROCEDURE — 1159F MED LIST DOCD IN RCRD: CPT | Performed by: ORTHOPAEDIC SURGERY

## 2024-02-20 ASSESSMENT — PAIN - FUNCTIONAL ASSESSMENT: PAIN_FUNCTIONAL_ASSESSMENT: 0-10

## 2024-02-20 ASSESSMENT — PAIN SCALES - GENERAL: PAINLEVEL_OUTOF10: 2

## 2024-02-20 NOTE — PROGRESS NOTES
Reason for Appointment  Chief Complaint   Patient presents with    Left Shoulder - Post-op     History of Present Illness  Patient is here today 2 weeks s/p a left reverse shoulder replacement 2/7/24. Patient is doing well overall. X-rays today show excellent alignment, no loosening. He has some swelling in the elbow. Incision looks excellent. He has minimal pain and is doing well with early motion. We discussed increasing shoulder motion and scheduling therapy. He will stay in the sling for the next 4 weeks but come out often to straighten the elbow. For the elbow swelling, we went over elbow stretching and massage. Follow-up in 6 weeks with repeat X-ray.     Assessment   Encounter Diagnosis   Name Primary?    Osteoarthritis of left shoulder, unspecified osteoarthritis type        I, Heike Diaz, attgerardo that this documentation has been prepared under the direction and in the presence of Josué Blanco MD. By signing below, I, Josué Blanco MD, personally performed the services described in this documentation. All medical record entries made by the scribe were at my direction and in my presence. I have reviewed the chart and agree that the record reflects my personal performance and is accurate and complete. 02/20/24

## 2024-02-22 ENCOUNTER — EVALUATION (OUTPATIENT)
Dept: OCCUPATIONAL THERAPY | Facility: CLINIC | Age: 74
End: 2024-02-22
Payer: MEDICARE

## 2024-02-22 DIAGNOSIS — M19.012 OSTEOARTHRITIS OF LEFT SHOULDER, UNSPECIFIED OSTEOARTHRITIS TYPE: ICD-10-CM

## 2024-02-22 DIAGNOSIS — M19.012 PRIMARY OSTEOARTHRITIS, LEFT SHOULDER: Primary | ICD-10-CM

## 2024-02-22 PROCEDURE — 97110 THERAPEUTIC EXERCISES: CPT | Mod: GO | Performed by: OCCUPATIONAL THERAPIST

## 2024-02-22 PROCEDURE — 97165 OT EVAL LOW COMPLEX 30 MIN: CPT | Mod: GO | Performed by: OCCUPATIONAL THERAPIST

## 2024-02-22 ASSESSMENT — PAIN - FUNCTIONAL ASSESSMENT: PAIN_FUNCTIONAL_ASSESSMENT: 0-10

## 2024-02-22 ASSESSMENT — ENCOUNTER SYMPTOMS
OCCASIONAL FEELINGS OF UNSTEADINESS: 0
LOSS OF SENSATION IN FEET: 0
DEPRESSION: 0

## 2024-02-22 ASSESSMENT — PAIN SCALES - GENERAL: PAINLEVEL_OUTOF10: 0 - NO PAIN

## 2024-02-22 NOTE — PROGRESS NOTES
"    Occupational Therapy  Occupational Therapy Orthopedic Evaluation    Patient Name: Klever Moran \"Don\"  MRN: [id number]  Today's Date: 2/22/2024       Insurance:  Visit number: 1 of 28  Insurance Type: Medicare       General:  Reason for visit: L TSR  Referred by: Arcadio    Current Problem  1. Primary osteoarthritis, left shoulder        2. Osteoarthritis of left shoulder, unspecified osteoarthritis type  Referral to Occupational Therapy          Precautions: 6 weeks=unrestricted AROM of L shoulder             12 weeks=full strengthening of L shoulder        Medical History Form: Reviewed (scanned into chart)    Subjective:   Chief Complaint: \"It has been sore for a very long time.\"  Onset: 2/7/17; Patient reports progressive worsening of L shoulder pain, stiffness, and weakness x 7 years due to repetitive use. Patient reports having\"3-4 cortisone injections\" prior to surgery.   SULEMAN: Overuse   DOI/DOS: 2/7/24=L TSR      Hand Dominance: Right    Current Condition since injury:   better      PAIN  Pain Assessment: 0-10  Pain Score: 0 - No pain  Aggravating Factors: Reaching overhead, Reaching behind back, Lifting/Carrying, Driving, and Dressing/Grooming   Relieving Factors: Rest, Ice, and Heat     Relevant Information (PMH & Previous Tests/Imaging): +X-rays , + L cochlear implant   Previous Interventions/Treatments: Physical Therapy/Occupational Therapy PT [place] [facility] [place]    Prior Level of Function (PLOF)  Exercise/Physical Activity: Patient reports being independent with all ADL's prior to injury.   Work/School: Patient is a retired /[employer]  Current ADL/IADL Status: Assist is needed with all ADL's involving L shoulder movement.      Patients Living Environment: Reviewed and no concern. Patient lives with his wife.     Primary Language: English    Pt goals for therapy: \"Normal use\" of L UE with all ADL's.     Red Flags: Do you have any of the following? " No  Fever/chills, unexplained weight changes, dizziness/fainting, unexplained change in bowel or bladder functions, unexplained malaise or muscle weakness, night pain/sweats, numbness or tingling    Objective:  Evaluation Complexity=low    ROM        Shoulder AROM (Degrees)   R L   Flexion 125 NT   Extension WFL NT   Abduction 155 NT   IR at 0 degrees abd WFL NT   ER at 0 degrees abd WFL NT   IR at 90 degrees abd 55 NT   Er at 90 degrees abd 45 NT    *Patient has limited AROM of R shoulder due to OA.    Shoulder PROM (Degrees)   R L   Flexion NT 55   Extension NT NT   Abduction NT 35   IR at 0 degrees abd NT WFL   ER at 0 degrees abd NT 20   IR at 90 degrees abd NT NT   Er at 90 degrees abd NT NT      Elbow  AROM (Degrees)   R L   Extension WFL WFL   Flexion WFL WFL   Pronation WFL WFL   Supination WFL WFL    *Initial stiffness was noted in all AROM of distal L UE.    Palpation: L shoulder is tender to the touch.     Observation: SX incision is intact with no signs of infection.     Radicular Symptoms: none  Numbness/Tingling: none    Edema: moderate in L elbow and biceps region.       Outcome Measures:  UEFI: 24/80    EDUCATION: home exercise program, plan of care, activity modifications, pain management, and injury pathology       Goals:  Active       OT Goals       Patient is independent with entire HEP.       Start:  02/22/24    Expected End:  03/23/24            Patient c/o 2/10 or less pain in L shoulder during ADL's and all exercises.        Start:  02/22/24    Expected End:  03/23/24            PROM: L shoulder [id number] or greater to assist with ADL completion.        Start:  02/22/24    Expected End:  04/07/24            L shoulder AROM is sufficient for independent completion of all ADL's and home management.        Start:  02/22/24    Expected End:  06/07/24            L shoulder strength is sufficient for independent        Start:  02/22/24    Expected End:  06/07/24                Plan of care was  developed with input and agreement by the patient    Treatments:     Therapeutic Exercise:   25 min  Therapeutic Exercise  Therapeutic Exercise Performed: Yes  Therapeutic Exercise Activity 1: AROM x 10 reps each with digits 1-5 E/F and opposition  Therapeutic Exercise Activity 2: AROM x 10 reps each with wrist E/F and UD/RD  Therapeutic Exercise Activity 3: AROM x 10 reps each with FA sup/pron and elbow E/F  Therapeutic Exercise Activity 4: Scapular retraction, elevation, CW rolls, and CCW rolls x 10 reps each  Therapeutic Exercise Activity 5: Pendums x 10 reps each  Therapeutic Exercise Activity 6: Forward flexion leans x 10 reps each      Assessment: Patient is a 74 yo male  s/p L TSR resulting in limited participation in pain-free ADLs and inability to perform at their prior level of function. Pt would benefit from occupational therapy to address the impairments found & listed previously in the objective section in order to return to safe and pain-free ADLs and prior level of function.       Plan:      Planned Interventions include: therapeutic exercise, therapeutic activity, self-care home management, manual therapy, therapeutic activities, gait training, neuromuscular coordination, vasopneumatic, dry needling, aquatic therapy, electric stimulation, fluidotherapy, ultrasound, kinesiotaping, orthosis fabrication, wound care  Frequency: 1-2 x Week  Duration: 14 weeks    Leanne James OT

## 2024-02-27 ENCOUNTER — TREATMENT (OUTPATIENT)
Dept: OCCUPATIONAL THERAPY | Facility: CLINIC | Age: 74
End: 2024-02-27
Payer: MEDICARE

## 2024-02-27 DIAGNOSIS — M19.012 PRIMARY OSTEOARTHRITIS, LEFT SHOULDER: ICD-10-CM

## 2024-02-27 PROCEDURE — 97140 MANUAL THERAPY 1/> REGIONS: CPT | Mod: GO | Performed by: OCCUPATIONAL THERAPIST

## 2024-02-27 PROCEDURE — 97110 THERAPEUTIC EXERCISES: CPT | Mod: GO | Performed by: OCCUPATIONAL THERAPIST

## 2024-02-27 ASSESSMENT — PAIN - FUNCTIONAL ASSESSMENT: PAIN_FUNCTIONAL_ASSESSMENT: 0-10

## 2024-02-27 ASSESSMENT — PAIN SCALES - GENERAL: PAINLEVEL_OUTOF10: 2

## 2024-02-27 NOTE — PROGRESS NOTES
"    Occupational Therapy  Occupational Therapy Treatment    Patient Name: Klever Moran  MRN: [id number]  Today's Date: 2/27/2024         Insurance:  Visit number: 2 of 28  Authorization info: no authorization  Insurance Type: Medicare A & B    General:  Reason for visit: L TSR  Referred by: Arcadio     Current Problem  1. Primary osteoarthritis, left shoulder  Follow Up In Occupational Therapy          Precautions   6 weeks=shoulder AROM       Subjective:   Patient reports \"I feel like I am moving better.:    Performing HEP?: Yes    Pain  Pain Assessment: 0-10  Pain Score: 2  Pain Type: Surgical pain  Pain Location: Shoulder  Pain Orientation: Left    Objective:     PROM: L shoulder [id number] with forward leans     Physical Observation: SX tape has been removed   Edema: none    Sensory: intact   Numbness/Tingling: none    Treatments:     Modalities:      5 min  Modalities  Modalities Used: Yes  Modality 1: Untimed Hotpack    Therapeutic Exercise:   3 0min  Therapeutic Exercise  Therapeutic Exercise Performed: Yes  Therapeutic Exercise Activity 1: AROM x 10 reps each with digit 1-5 E/F and opposition  Therapeutic Exercise Activity 2: AROM x 10 reps each with wrist E/F and UD/RD  Therapeutic Exercise Activity 3: AROMx 10 reps each with FA sup/pron and elbow E/F  Therapeutic Exercise Activity 4: Scapular mobs x 10 reps each  Therapeutic Exercise Activity 5: pendulums x 10 reps each  Therapeutic Exercise Activity 6: forward flexion leans x 10 reps    Manual Therapy:     10 min  Manual Therapy  Manual Therapy Performed: Yes  Manual Therapy Activity 1: L upper trapezius tissue mobilization      Assessment: Improvement is noted with forward flexion of L shoulder        Plan: Continue with plan of care 1-2x/wk.       Leanne James OT  "

## 2024-03-04 ENCOUNTER — OFFICE VISIT (OUTPATIENT)
Dept: DERMATOLOGY | Facility: CLINIC | Age: 74
End: 2024-03-04
Payer: MEDICARE

## 2024-03-04 DIAGNOSIS — C44.329 SQUAMOUS CELL CARCINOMA OF FOREHEAD: Primary | ICD-10-CM

## 2024-03-04 PROCEDURE — 1036F TOBACCO NON-USER: CPT | Performed by: DERMATOLOGY

## 2024-03-04 PROCEDURE — 1160F RVW MEDS BY RX/DR IN RCRD: CPT | Performed by: DERMATOLOGY

## 2024-03-04 PROCEDURE — 1125F AMNT PAIN NOTED PAIN PRSNT: CPT | Performed by: DERMATOLOGY

## 2024-03-04 PROCEDURE — 13132 CMPLX RPR F/C/C/M/N/AX/G/H/F: CPT | Performed by: DERMATOLOGY

## 2024-03-04 PROCEDURE — 1157F ADVNC CARE PLAN IN RCRD: CPT | Performed by: DERMATOLOGY

## 2024-03-04 PROCEDURE — 17311 MOHS 1 STAGE H/N/HF/G: CPT | Performed by: DERMATOLOGY

## 2024-03-04 PROCEDURE — 99214 OFFICE O/P EST MOD 30 MIN: CPT | Performed by: DERMATOLOGY

## 2024-03-04 PROCEDURE — 17312 MOHS ADDL STAGE: CPT | Performed by: DERMATOLOGY

## 2024-03-04 PROCEDURE — 1159F MED LIST DOCD IN RCRD: CPT | Performed by: DERMATOLOGY

## 2024-03-04 NOTE — LETTER
March 6, 2024     Mee Qureshi  [address] [id number]    Patient: Jose Moran   YOB: 1950   Date of Visit: 3/4/2024       Dear Dr. Mee Qureshi:    Thank you for referring Jose Moran to me for evaluation. Below are my notes for this consultation.  If you have questions, please do not hesitate to call me. I look forward to following your patient along with you.       Sincerely,     Marianne Barba MD      CC: No Recipients  ______________________________________________________________________________________    Mohs Surgery Operative Note    Date of Surgery:  3/4/2024  Surgeon:  Marianne Barba MD  Office Location: [address] [address]  Dept: [phone]  Dept Fax: [phone]  Referring Provider: Mee Qureshi  [address] [place] [address]      Assessment/Plan  Pre-procedure:   Obtained informed consent: written from patient  The surgical site was identified and confirmed with the patient.     Intra-operative:   Audible time out called at : 8:15 AM 03/04/24  by: Mary Chatman LPN   Verified patient name, birthdate, site, specimen bottle label & requisition.    The planned procedure(s) was again reviewed with the patient. The risks of bleeding, infection, nerve damage and scarring were reviewed. Written authorization was obtained. The patient identity, surgical site, and planned procedure(s) were verified. The provider acted as both surgeon and pathologist.     Squamous cell carcinoma of skin  Right Superior Forehead  Mohs surgery  Consent obtained: written    Universal Protocol:  Procedure explained and questions answered to patient or proxy's satisfaction: Yes    Test results available and properly labeled: Yes    Pathology report reviewed: Yes    External notes reviewed: Yes    Photo or diagram used for site identification: Yes    Site/side marked: Yes    Slide independently reviewed  by Mohs surgeon: Yes    Immediately prior to procedure a time out was called: Yes    Patient identity confirmed: verbally with patient  Preparation: Patient was prepped and draped in usual sterile fashion      Anticoagulation:  Is the patient taking prescription anticoagulant and/or aspirin prescribed/recommended by a physician? Yes    Was the anticoagulation regimen changed prior to Mohs? No      Anesthesia:  Anesthesia method: local infiltration  Local anesthetic: 0.5% Lidocaine with Epinephrine.    Procedure Details:  Biopsy accession number: [id number]  Date of biopsy: 1/18/2024  Pre-Op diagnosis: squamous cell carcinoma  SCC subtype: in situ  Surgery side: right  Surgical site (from skin exam): Right Superior Forehead  Pre-operative length (cm): 1.1  Pre-operative width (cm): 1.5  Indications for Mohs surgery: ill-defined borders  Previously treated? No      Micrographic Surgery Details:  Post-operative length (cm): 2  Post-operative width (cm): 2.2  Number of Mohs stages: 2    Stage 1     Comments: The patient was brought into the operating room and placed in the procedure chair in the appropriate position.  The area positive by previous biopsy was identified and confirmed with the patient. The area of clinically obvious tumor was debulked using a curette and/or scalpel as needed. An incision was made following the Mohs approach through the skin. The specimen was taken to the lab, divided into 2 piece(s) and appropriately chromacoded and processed.  Tumor was seen on both the lateral and deep margins as indicated on the on the Mohs map.  Invasive squamous cell carcinoma. Histologic examination revealed atypical keratinocytes with large nuclear to cytoplasmic ratio with areas of keratinization.   Tumor features identified on Mohs section: squamous cell carcinoma   Depth of invasion: dermis    Stage 2     Comments: The area of positivity as noted on the Mohs map in the previous stage was identified and removed  using the Mohs technique. The specimen was taken to the lab and appropriately chromacoded and processed in 1 piece(s).  Tumor features identified on Mohs section: no tumor identified  Depth of defect: subcutaneous fat    Patient tolerance of procedure: tolerated well, no immediate complications    Reconstruction:  Was the defect reconstructed? Yes    Was reconstruction performed by the same Mohs surgeon? Yes    Setting of reconstruction: outpatient office  When was reconstruction performed? same day  Type of reconstruction: linear  Linear reconstruction: complex  Length of linear repair (cm): 5.5  Subcutaneous Layers (Deep Stitches)   Suture size:  3-0  Suture type:  Vicryl  Stitches:  Buried vertical mattress  Fine/surface layer approximation (top stitches)   Epidermal/Superficial suture size:  5-0  Epidermal/Superficial suture type:  Fast-absorbing gut  Stitches: simple running    Hemostasis achieved with: electrodesiccation  Outcome: patient tolerated procedure well with no complications    Post-procedure details: sterile dressing applied and wound care instructions given    Dressing type: petrolatum, pressure dressing, Telfa pad and bandage      Complex Linear Repair - Wide Undermining:  Given the location and size of the defect, it was determined that a complex layered linear closure was required to restore normal anatomy and function. The repair was considered complex because extensive undermining was required and performed. The amount of undermining performed was greater than the maximum width of the defect as measured perpendicular to the closure line along at least one entire edge of the defect. Standing cutaneous cones were removed using Burow's triangles. The wound edges were brought into close approximation with buried vertical mattress sutures. The remainder of the wound was then closed with epidermal top sutures.    The final repair measured 5.5 cm              Wound care was discussed, and the patient  "was given written post-operative wound care instructions.      The patient will follow up with Marianne Barba MD as needed for any post operative problems or concerns, and will follow up with their primary dermatologist as scheduled.        Office Visit Note  Date: 3/4/2024  Surgeon:  Marianne Barba MD  Office Location:  [address] [facility]  [address] [facility] [address]  [facility] [address]  Dept: [phone]  Dept Fax: [phone]  Referring Provider: Mee Qureshi  [address] [place], [place] [id number]    Subjective  Klever Moran \"Don\" is a 73 y.o. male who presents for the following: MOHS Surgery    According to the patient, the lesion has been present for approximately greater than 1 year at the time of diagnosis.  The lesion is painful.  The lesion has not been treated previously.    The patient does not have a pacemaker / defibrillator.  The patient does have a heart valve / joint replacement.    The patient is not on blood thinners.  The patient does not have a history of hepatitis B or C.  The patient does not have a history of HIV.  The patient does not have a history of immunosuppression (e.g. organ transplantation, malignancy, medications)    Review of Systems:  No other skin or systemic complaints other than what is documented elsewhere in the note.    MEDICAL HISTORY: clinically relevant history including significant past medical history, medications and allergies was reviewed and documented in Epic.    Objective  Well appearing patient in no apparent distress; mood and affect are within normal limits.  Vital signs: See record.  Noted on the Right Superior Forehead  Is a 1.1 x 1.5 cm scar    The patient confirmed the identified site.    Discussion:  The nature of the diagnosis was explained. The lesion is a skin cancer.  It has a risk of local growth and distant spread. The condition is associated with sun exposure.  Warning signs of non-melanoma skin cancer " discussed. Patient was instructed to perform monthly self skin examination.  We recommended that the patient have regular full skin exams given an increased risk of subsequent skin cancers. The patient was instructed to use sun protective behaviors including use of broad spectrum sunscreens and sun protective clothing to reduce risk of skin cancers.      Risks, benefits, side effects of Mohs surgery were discussed with patient and the patient voiced understanding.  It was explained that even though the cure rate of Mohs is very high it is not 100%. Risks of surgery including but not limited to bleeding, infection, numbness, nerve damage, and scar were reviewed.  Discussion included wound care requirements, activity restrictions, likely scar outcome and time to heal.     After Mohs surgery, the defect may need to be repaired surgically and the scar may be longer than the original lesion.  Reconstruction options, risks, and benefits were reviewed including second intention healing, linear repair (4-1 ratio was explained), local flaps, skin grafts, cartilage grafts and interpolation flaps (the need for multiple surgeries was explained). Possible outcomes were reviewed including likely scar appearance, failure of flap survival, infection, bleeding and the need for revision surgery.     The pathology was reviewed, the photograph was reviewed, and the referring physician's note was reviewed.    Patient elected for Mohs surgery.

## 2024-03-04 NOTE — PROGRESS NOTES
"Office Visit Note  Date: 3/4/2024  Surgeon:  Marianne Barba MD  Office Location: DO [address] [facility]  [address] [facility] [address]  [facility] [address]  Dept: [phone]  Dept Fax: [phone]  Referring Provider: Mee Qureshi  [address] [place] [address]    Subjective   Klever Moran \"Don\" is a 73 y.o. male who presents for the following: MOHS Surgery    According to the patient, the lesion has been present for approximately greater than 1 year at the time of diagnosis.  The lesion is painful.  The lesion has not been treated previously.    The patient does not have a pacemaker / defibrillator.  The patient does have a heart valve / joint replacement.    The patient is not on blood thinners.  The patient does not have a history of hepatitis B or C.  The patient does not have a history of HIV.  The patient does not have a history of immunosuppression (e.g. organ transplantation, malignancy, medications)    Review of Systems:  No other skin or systemic complaints other than what is documented elsewhere in the note.    MEDICAL HISTORY: clinically relevant history including significant past medical history, medications and allergies was reviewed and documented in Epic.    Objective   Well appearing patient in no apparent distress; mood and affect are within normal limits.  Vital signs: See record.  Noted on the Right Superior Forehead  Is a 1.1 x 1.5 cm scar    The patient confirmed the identified site.    Discussion:  The nature of the diagnosis was explained. The lesion is a skin cancer.  It has a risk of local growth and distant spread. The condition is associated with sun exposure.  Warning signs of non-melanoma skin cancer discussed. Patient was instructed to perform monthly self skin examination.  We recommended that the patient have regular full skin exams given an increased risk of subsequent skin cancers. The patient was instructed to use sun protective behaviors " including use of broad spectrum sunscreens and sun protective clothing to reduce risk of skin cancers.      Risks, benefits, side effects of Mohs surgery were discussed with patient and the patient voiced understanding.  It was explained that even though the cure rate of Mohs is very high it is not 100%. Risks of surgery including but not limited to bleeding, infection, numbness, nerve damage, and scar were reviewed.  Discussion included wound care requirements, activity restrictions, likely scar outcome and time to heal.     After Mohs surgery, the defect may need to be repaired surgically and the scar may be longer than the original lesion.  Reconstruction options, risks, and benefits were reviewed including second intention healing, linear repair (4-1 ratio was explained), local flaps, skin grafts, cartilage grafts and interpolation flaps (the need for multiple surgeries was explained). Possible outcomes were reviewed including likely scar appearance, failure of flap survival, infection, bleeding and the need for revision surgery.     The pathology was reviewed, the photograph was reviewed, and the referring physician's note was reviewed.    Patient elected for Mohs surgery.

## 2024-03-04 NOTE — LETTER
March 8, 2024     Mee Qureshi  [address]  [facility] [address]    Patient: Jose Moran   YOB: 1950   Date of Visit: 3/4/2024       Dear Dr. Mee Qureshi:    Thank you for referring Jose Moran to me for evaluation. Below are my notes for this consultation.  If you have questions, please do not hesitate to call me. I look forward to following your patient along with you.       Sincerely,     Marianne Barba MD      CC: No Recipients  ______________________________________________________________________________________    Mohs Surgery Operative Note    Date of Surgery:  3/4/2024  Surgeon:  Marianne Barba MD  Office Location: [address] [address]  Dept: [phone]  Dept Fax: [phone]  Referring Provider: Mee Qureshi  [address] [place] [address]      Assessment/Plan  Pre-procedure:   Obtained informed consent: written from patient  The surgical site was identified and confirmed with the patient.     Intra-operative:   Audible time out called at : 8:15 AM 03/04/24  by: Mary Chatman LPN   Verified patient name, birthdate, site, specimen bottle label & requisition.    The planned procedure(s) was again reviewed with the patient. The risks of bleeding, infection, nerve damage and scarring were reviewed. Written authorization was obtained. The patient identity, surgical site, and planned procedure(s) were verified. The provider acted as both surgeon and pathologist.     Squamous cell carcinoma of skin  Right Superior Forehead  Mohs surgery  Consent obtained: written    Universal Protocol:  Procedure explained and questions answered to patient or proxy's satisfaction: Yes    Test results available and properly labeled: Yes    Pathology report reviewed: Yes    External notes reviewed: Yes    Photo or diagram used for site identification: Yes    Site/side marked: Yes    Slide independently  reviewed by Mohs surgeon: Yes    Immediately prior to procedure a time out was called: Yes    Patient identity confirmed: verbally with patient  Preparation: Patient was prepped and draped in usual sterile fashion      Anticoagulation:  Is the patient taking prescription anticoagulant and/or aspirin prescribed/recommended by a physician? Yes    Was the anticoagulation regimen changed prior to Mohs? No      Anesthesia:  Anesthesia method: local infiltration  Local anesthetic: 0.5% Lidocaine with Epinephrine.    Procedure Details:  Biopsy accession number: [id number]  Date of biopsy: 1/18/2024  Pre-Op diagnosis: squamous cell carcinoma  SCC subtype: in situ  Surgery side: right  Surgical site (from skin exam): Right Superior Forehead  Pre-operative length (cm): 1.1  Pre-operative width (cm): 1.5  Indications for Mohs surgery: ill-defined borders  Previously treated? No      Micrographic Surgery Details:  Post-operative length (cm): 2  Post-operative width (cm): 2.2  Number of Mohs stages: 2    Stage 1     Comments: The patient was brought into the operating room and placed in the procedure chair in the appropriate position.  The area positive by previous biopsy was identified and confirmed with the patient. The area of clinically obvious tumor was debulked using a curette and/or scalpel as needed. An incision was made following the Mohs approach through the skin. The specimen was taken to the lab, divided into 2 piece(s) and appropriately chromacoded and processed.  Tumor was seen on both the lateral and deep margins as indicated on the on the Mohs map.  Invasive squamous cell carcinoma. Histologic examination revealed atypical keratinocytes with large nuclear to cytoplasmic ratio with areas of keratinization.   Tumor features identified on Mohs section: squamous cell carcinoma   Depth of invasion: dermis    Stage 2     Comments: The area of positivity as noted on the Mohs map in the previous stage was identified and  removed using the Mohs technique. The specimen was taken to the lab and appropriately chromacoded and processed in 1 piece(s).  Tumor features identified on Mohs section: no tumor identified  Depth of defect: subcutaneous fat    Patient tolerance of procedure: tolerated well, no immediate complications    Reconstruction:  Was the defect reconstructed? Yes    Was reconstruction performed by the same Mohs surgeon? Yes    Setting of reconstruction: outpatient office  When was reconstruction performed? same day  Type of reconstruction: linear  Linear reconstruction: complex  Length of linear repair (cm): 5.5  Subcutaneous Layers (Deep Stitches)   Suture size:  3-0  Suture type:  Vicryl  Stitches:  Buried vertical mattress  Fine/surface layer approximation (top stitches)   Epidermal/Superficial suture size:  5-0  Epidermal/Superficial suture type:  Fast-absorbing gut  Stitches: simple running    Hemostasis achieved with: electrodesiccation  Outcome: patient tolerated procedure well with no complications    Post-procedure details: sterile dressing applied and wound care instructions given    Dressing type: petrolatum, pressure dressing, Telfa pad and bandage      Complex Linear Repair - Wide Undermining:  Given the location and size of the defect, it was determined that a complex layered linear closure was required to restore normal anatomy and function. The repair was considered complex because extensive undermining was required and performed. The amount of undermining performed was greater than the maximum width of the defect as measured perpendicular to the closure line along at least one entire edge of the defect. Standing cutaneous cones were removed using Burow's triangles. The wound edges were brought into close approximation with buried vertical mattress sutures. The remainder of the wound was then closed with epidermal top sutures.    The final repair measured 5.5 cm              Wound care was discussed, and the  "patient was given written post-operative wound care instructions.      The patient will follow up with Marianne Barba MD as needed for any post operative problems or concerns, and will follow up with their primary dermatologist as scheduled.        Office Visit Note  Date: 3/4/2024  Surgeon:  Marianne Barba MD  Office Location:  [address] [facility]  [address] [facility] [address]  [facility] [address]  Dept: [phone]  Dept Fax: [phone]  Referring Provider: Mee Qureshi  [address] [place] [address]    Subjective  Klever Moran \"Don\" is a 73 y.o. male who presents for the following: MOHS Surgery    According to the patient, the lesion has been present for approximately greater than 1 year at the time of diagnosis.  The lesion is painful.  The lesion has not been treated previously.    The patient does not have a pacemaker / defibrillator.  The patient does have a heart valve / joint replacement.    The patient is not on blood thinners.  The patient does not have a history of hepatitis B or C.  The patient does not have a history of HIV.  The patient does not have a history of immunosuppression (e.g. organ transplantation, malignancy, medications)    Review of Systems:  No other skin or systemic complaints other than what is documented elsewhere in the note.    MEDICAL HISTORY: clinically relevant history including significant past medical history, medications and allergies was reviewed and documented in Epic.    Objective  Well appearing patient in no apparent distress; mood and affect are within normal limits.  Vital signs: See record.  Noted on the Right Superior Forehead  Is a 1.1 x 1.5 cm scar    The patient confirmed the identified site.    Discussion:  The nature of the diagnosis was explained. The lesion is a skin cancer.  It has a risk of local growth and distant spread. The condition is associated with sun exposure.  Warning signs of non-melanoma skin " cancer discussed. Patient was instructed to perform monthly self skin examination.  We recommended that the patient have regular full skin exams given an increased risk of subsequent skin cancers. The patient was instructed to use sun protective behaviors including use of broad spectrum sunscreens and sun protective clothing to reduce risk of skin cancers.      Risks, benefits, side effects of Mohs surgery were discussed with patient and the patient voiced understanding.  It was explained that even though the cure rate of Mohs is very high it is not 100%. Risks of surgery including but not limited to bleeding, infection, numbness, nerve damage, and scar were reviewed.  Discussion included wound care requirements, activity restrictions, likely scar outcome and time to heal.     After Mohs surgery, the defect may need to be repaired surgically and the scar may be longer than the original lesion.  Reconstruction options, risks, and benefits were reviewed including second intention healing, linear repair (4-1 ratio was explained), local flaps, skin grafts, cartilage grafts and interpolation flaps (the need for multiple surgeries was explained). Possible outcomes were reviewed including likely scar appearance, failure of flap survival, infection, bleeding and the need for revision surgery.     The pathology was reviewed, the photograph was reviewed, and the referring physician's note was reviewed.    Patient elected for Mohs surgery.

## 2024-03-04 NOTE — PROGRESS NOTES
Mohs Surgery Operative Note    Date of Surgery:  3/4/2024  Surgeon:  Marianne Barba MD  Office Location:  [address] [facility]  [address] [facility] [address]  [facility] [address]  Dept: [phone]  Dept Fax: [phone]  Referring Provider: Mee Qureshi  [address] [place], [place] [id number]      Assessment/Plan   Pre-procedure:   Obtained informed consent: written from patient  The surgical site was identified and confirmed with the patient.     Intra-operative:   Audible time out called at : 8:15 AM 03/04/24  by: Mary Chatman LPN   Verified patient name, birthdate, site, specimen bottle label & requisition.    The planned procedure(s) was again reviewed with the patient. The risks of bleeding, infection, nerve damage and scarring were reviewed. Written authorization was obtained. The patient identity, surgical site, and planned procedure(s) were verified. The provider acted as both surgeon and pathologist.     Squamous cell carcinoma of skin  Right Superior Forehead  Mohs surgery  Consent obtained: written    Universal Protocol:  Procedure explained and questions answered to patient or proxy's satisfaction: Yes    Test results available and properly labeled: Yes    Pathology report reviewed: Yes    External notes reviewed: Yes    Photo or diagram used for site identification: Yes    Site/side marked: Yes    Slide independently reviewed by Mohs surgeon: Yes    Immediately prior to procedure a time out was called: Yes    Patient identity confirmed: verbally with patient  Preparation: Patient was prepped and draped in usual sterile fashion      Anticoagulation:  Is the patient taking prescription anticoagulant and/or aspirin prescribed/recommended by a physician? Yes    Was the anticoagulation regimen changed prior to Mohs? No      Anesthesia:  Anesthesia method: local infiltration  Local anesthetic: 0.5% Lidocaine with Epinephrine.    Procedure Details:  Biopsy accession number:  [id number]  Date of biopsy: 1/18/2024  Pre-Op diagnosis: squamous cell carcinoma  SCC subtype: in situ  Surgery side: right  Surgical site (from skin exam): Right Superior Forehead  Pre-operative length (cm): 1.1  Pre-operative width (cm): 1.5  Indications for Mohs surgery: ill-defined borders  Previously treated? No      Micrographic Surgery Details:  Post-operative length (cm): 2  Post-operative width (cm): 2.2  Number of Mohs stages: 2    Stage 1     Comments: The patient was brought into the operating room and placed in the procedure chair in the appropriate position.  The area positive by previous biopsy was identified and confirmed with the patient. The area of clinically obvious tumor was debulked using a curette and/or scalpel as needed. An incision was made following the Mohs approach through the skin. The specimen was taken to the lab, divided into 2 piece(s) and appropriately chromacoded and processed.  Tumor was seen on both the lateral and deep margins as indicated on the on the Mohs map.  Invasive squamous cell carcinoma. Histologic examination revealed atypical keratinocytes with large nuclear to cytoplasmic ratio with areas of keratinization.   Tumor features identified on Mohs section: squamous cell carcinoma   Depth of invasion: dermis    Stage 2     Comments: The area of positivity as noted on the Mohs map in the previous stage was identified and removed using the Mohs technique. The specimen was taken to the lab and appropriately chromacoded and processed in 1 piece(s).  Tumor features identified on Mohs section: no tumor identified  Depth of defect: subcutaneous fat    Patient tolerance of procedure: tolerated well, no immediate complications    Reconstruction:  Was the defect reconstructed? Yes    Was reconstruction performed by the same Mohs surgeon? Yes    Setting of reconstruction: outpatient office  When was reconstruction performed? same day  Type of reconstruction: linear  Linear  reconstruction: complex  Length of linear repair (cm): 5.5  Subcutaneous Layers (Deep Stitches)   Suture size:  3-0  Suture type:  Vicryl  Stitches:  Buried vertical mattress  Fine/surface layer approximation (top stitches)   Epidermal/Superficial suture size:  5-0  Epidermal/Superficial suture type:  Fast-absorbing gut  Stitches: simple running    Hemostasis achieved with: electrodesiccation  Outcome: patient tolerated procedure well with no complications    Post-procedure details: sterile dressing applied and wound care instructions given    Dressing type: petrolatum, pressure dressing, Telfa pad and bandage      Complex Linear Repair - Wide Undermining:  Given the location and size of the defect, it was determined that a complex layered linear closure was required to restore normal anatomy and function. The repair was considered complex because extensive undermining was required and performed. The amount of undermining performed was greater than the maximum width of the defect as measured perpendicular to the closure line along at least one entire edge of the defect. Standing cutaneous cones were removed using Burow's triangles. The wound edges were brought into close approximation with buried vertical mattress sutures. The remainder of the wound was then closed with epidermal top sutures.    The final repair measured 5.5 cm              Wound care was discussed, and the patient was given written post-operative wound care instructions.      The patient will follow up with Marianne Barba MD as needed for any post operative problems or concerns, and will follow up with their primary dermatologist as scheduled.

## 2024-03-05 ENCOUNTER — TREATMENT (OUTPATIENT)
Dept: OCCUPATIONAL THERAPY | Facility: CLINIC | Age: 74
End: 2024-03-05
Payer: MEDICARE

## 2024-03-05 DIAGNOSIS — M19.012 PRIMARY OSTEOARTHRITIS, LEFT SHOULDER: ICD-10-CM

## 2024-03-05 PROCEDURE — 97140 MANUAL THERAPY 1/> REGIONS: CPT | Mod: GO | Performed by: OCCUPATIONAL THERAPIST

## 2024-03-05 PROCEDURE — 97110 THERAPEUTIC EXERCISES: CPT | Mod: GO | Performed by: OCCUPATIONAL THERAPIST

## 2024-03-05 ASSESSMENT — PAIN SCALES - GENERAL: PAINLEVEL_OUTOF10: 2

## 2024-03-05 ASSESSMENT — PAIN - FUNCTIONAL ASSESSMENT: PAIN_FUNCTIONAL_ASSESSMENT: 0-10

## 2024-03-05 NOTE — PROGRESS NOTES
"    Occupational Therapy  Occupational Therapy Treatment    Patient Name: Klever Moran  MRN: [id number]  Today's Date: 3/5/2024         Insurance:  Visit number: 3 of 28  Authorization info: no authorization is needed  Insurance Type: Medicare A & B    General:  Reason for visit: L TSR  Referred by: Arcadio     Current Problem  1. Primary osteoarthritis, left shoulder  Follow Up In Occupational Therapy          Precautions     6 weeks=Shoulder AROM    Subjective:   Patient reports \"I feel pretty good with the new stuff.\"    Performing HEP?: Yes    Pain  Pain Assessment: 0-10  Pain Score: 2  Pain Type: Chronic pain  Pain Location: Shoulder  Pain Orientation: Left    Objective:     PROM: L shoulder abduction=90 with table top leans     Physical Observation: intact incision   Edema: none    Sensory: intact   Numbness/Tingling: none    Treatments:     Modalities:      5 min  Modalities  Modalities Used: Yes  Modality 1: Untimed Hotpack    Therapeutic Exercise:   40 min  Therapeutic Exercise  Therapeutic Exercise Performed: Yes  Therapeutic Exercise Activity 1: Elbow E/F x 10 reps each  Therapeutic Exercise Activity 2: Scapular mobs x 15 reps each  Therapeutic Exercise Activity 3: forward flexion x 10 reps  Therapeutic Exercise Activity 4: table top shoulder flexion and abduction x 10 reps each (issued for HEP)  Therapeutic Exercise Activity 5: pendulums x 15 reps each    Manual Therapy:     10 min  Manual Therapy  Manual Therapy Performed: Yes  Manual Therapy Activity 1: L upper trapezius tissue mobilization and trigger point release    Assessment: Good tolerance with all upgraded exercises       Plan: Continue with plan of care 1-2x/wk       Leanne James OT  "

## 2024-03-08 ENCOUNTER — TREATMENT (OUTPATIENT)
Dept: OCCUPATIONAL THERAPY | Facility: CLINIC | Age: 74
End: 2024-03-08
Payer: MEDICARE

## 2024-03-08 DIAGNOSIS — M19.012 PRIMARY OSTEOARTHRITIS, LEFT SHOULDER: ICD-10-CM

## 2024-03-08 PROCEDURE — 97110 THERAPEUTIC EXERCISES: CPT | Mod: GO | Performed by: OCCUPATIONAL THERAPIST

## 2024-03-08 ASSESSMENT — PAIN - FUNCTIONAL ASSESSMENT: PAIN_FUNCTIONAL_ASSESSMENT: 0-10

## 2024-03-08 ASSESSMENT — PAIN SCALES - GENERAL: PAINLEVEL_OUTOF10: 0 - NO PAIN

## 2024-03-08 NOTE — PROGRESS NOTES
"    Occupational Therapy  Occupational Therapy Treatment    Patient Name: Klever Moran  MRN: [id number]  Today's Date: 3/8/2024         Insurance:  Visit number: 4 of 28  Authorization info: no authorization is needed   Insurance Type: Medicare A & B    General:  Reason for visit: L TSR   Referred by: Arcadio    Current Problem  1. Primary osteoarthritis, left shoulder  Follow Up In Occupational Therapy          Precautions   6 weeks=shoulder AROM      Subjective:   Patient reports \"I don't really have any pain.\"    Performing HEP?: Yes    Pain  Pain Assessment: 0-10  Pain Score: 0 - No pain    Objective:     PROM: L shoulder [id number]=006 with forward leans     Physical Observation: intact incision   Edema: none    Sensory: intact   Numbness/Tingling: none    Treatments:     Modalities:      5 min  Modalities  Modalities Used: Yes  Modality 1: Untimed Hotpack    Therapeutic Exercise:   40 min  Therapeutic Exercise  Therapeutic Exercise Performed: Yes  Therapeutic Exercise Activity 1: elbow E/F x 15 reps each  Therapeutic Exercise Activity 2: scapular mobs x 15 reps each  Therapeutic Exercise Activity 3: pendulums x 15 reps each  Therapeutic Exercise Activity 4: forward flexion leans x 15 reps each  Therapeutic Exercise Activity 5: table top shoulder flexion and abcution x 10 reps each    Manual Therapy:     10 min  Manual Therapy  Manual Therapy Performed: Yes  Manual Therapy Activity 1: L upper trapezius tissue mobilization with use of Biofreeze    Assessment: Patient demonstrates improved ROM with shoulder flexion this p.m.       Plan: Continue with plan of care        Leanne James OT  "

## 2024-03-12 ENCOUNTER — TREATMENT (OUTPATIENT)
Dept: OCCUPATIONAL THERAPY | Facility: CLINIC | Age: 74
End: 2024-03-12
Payer: MEDICARE

## 2024-03-12 DIAGNOSIS — M19.012 PRIMARY OSTEOARTHRITIS, LEFT SHOULDER: ICD-10-CM

## 2024-03-12 PROCEDURE — 97110 THERAPEUTIC EXERCISES: CPT | Mod: GO | Performed by: OCCUPATIONAL THERAPIST

## 2024-03-12 PROCEDURE — 97022 WHIRLPOOL THERAPY: CPT | Mod: GO | Performed by: OCCUPATIONAL THERAPIST

## 2024-03-12 ASSESSMENT — PAIN SCALES - GENERAL: PAINLEVEL_OUTOF10: 0 - NO PAIN

## 2024-03-12 ASSESSMENT — PAIN - FUNCTIONAL ASSESSMENT: PAIN_FUNCTIONAL_ASSESSMENT: 0-10

## 2024-03-12 NOTE — PROGRESS NOTES
"    Occupational Therapy  Occupational Therapy Treatment    Patient Name: Klever Moran  MRN: [id number]  Today's Date: 3/12/2024         Insurance:    Time:       Insurance:  Visit number: 5 of 28   Authorization info: no authorization is needed   Insurance Type: Payor: MEDICARE / Plan: MEDICARE PART A AND B / Product Type: *No Product type* /    General:  Reason for visit: L TSR   Referred by: Arcadio    Current Problem  1. Primary osteoarthritis, left shoulder  Follow Up In Occupational Therapy          Precautions   6 weeks=shoulder AROM       Subjective:   Patient reports \"I am doing better every day.\"    Performing HEP?: Yes    Pain  Pain Assessment: 0-10  Pain Score: 0 - No pain    Objective:     PROM: L shoulder abduction=95 with table top sideways lean    Physical Observation: Intact   Edema: none    Sensory: intact  Numbness/Tingling: none    Treatments:     Modalities:      5 min  Modalities  Modalities Used: Yes  Modality 1: Untimed Hotpack    Therapeutic Exercise:   40 min  Therapeutic Exercise  Therapeutic Exercise Performed: Yes  Therapeutic Exercise Activity 1: elbow E/F x 15 reps each with thumb up, palm up, and palm down  Therapeutic Exercise Activity 2: scapualr mobs x 15 reps each  Therapeutic Exercise Activity 3: pendulums x 15 reps each  Therapeutic Exercise Activity 4: forward flexion leans x 15  Therapeutic Exercise Activity 5: table top shoulder flexion and abduction x 15 reps each  Therapeutic Exercise Activity 6: gentle overhead pulleys x 10 reps each with flexion and scaption    Manual Therapy:     10 min  Manual Therapy  Manual Therapy Performed: Yes  Manual Therapy Activity 1: L upper trapezius trigger point and soft tissue mobilization    Assessment: Improvement is noted with abduction of L shoulder this p.m.        Plan: Patient is scheduled to continue therapy at another location. Recommend continuation of OT services per SX protocol.        Leanne James OT  "

## 2024-03-15 ENCOUNTER — TREATMENT (OUTPATIENT)
Dept: OCCUPATIONAL THERAPY | Facility: CLINIC | Age: 74
End: 2024-03-15
Payer: MEDICARE

## 2024-03-15 DIAGNOSIS — M25.612 SHOULDER STIFFNESS, LEFT: Primary | ICD-10-CM

## 2024-03-15 DIAGNOSIS — M19.012 PRIMARY OSTEOARTHRITIS, LEFT SHOULDER: ICD-10-CM

## 2024-03-15 PROCEDURE — 97110 THERAPEUTIC EXERCISES: CPT | Mod: GO

## 2024-03-15 PROCEDURE — 97140 MANUAL THERAPY 1/> REGIONS: CPT | Mod: GO

## 2024-03-15 NOTE — PROGRESS NOTES
Occupational Therapy  Occupational Therapy Treatment    Patient Name: Klever Moran  MRN: [id number]  Today's Date: 3/15/2024  Time Calculation  Start Time: 0805  Stop Time: 0844  Time Calculation (min): 39 min           General Comment: Visit 6; onset 2-7-24; no auth    Assessment:  Joint mobility/ROM increased, flexibility increased, muscle guarding decreased.    Pt completed session with some difficulty.           Plan: Progress with POC, As tolerated and monitor home program.        Current Problem  1. Shoulder stiffness, left        2. Primary osteoarthritis, left shoulder  Follow Up In Occupational Therapy          Precautions R TSR       Subjective:   Patient reports No pain. I am just wearing the splint while driving and to bed.     Pain   No pain    Performing HEP?: Yes    Objective:   All exercises held for 10 seconds unless noted otherwise   Treatments:   This therapist instructed and demonstrated interventions to patient, patient completed the following under direct supervision of this therapist:      Therapeutic Exercise:   min  31 MINUTES  Therapeutic Exercise  Therapeutic Exercise Activity 5: pendulums x 15 reps each  Therapeutic Exercise Activity 7: Scapular retraction x 10  Therapeutic Exercise Activity 8: shoulder rolls x10  Therapeutic Exercise Activity 9: AAEXT x10  Therapeutic Exercise Activity 10: close  dowel flexion 8 x 15 sec  Therapeutic Exercise Activity 11: Sidelying bent arm abd x 10  Therapeutic Exercise Activity 12: Pulley flexion x 10  Therapeutic Exercise Activity 13: Pulley scaption x 10  Therapeutic Exercise Activity 14: Pulley abd x 10    Manual Therapy:       8 MINUTES  Manual Therapy  Manual Therapy Activity 1:  (Trigger release to infraspinatus, subscap, and rhomboids.)    Therapeutic Activity:           Goals adjusted      Education: pt educated on sidelying bent arm abd and supine close grasp dowel flexion.       David Turner, OT, CHT    Active       OT Goals        Patient is independent with entire HEP.       Start:  02/22/24    Expected End:  07/15/24            Patient c/o 2/10 or less pain in L shoulder during ADL's and all exercises.        Start:  02/22/24    Expected End:  07/15/24            PROM: L shoulder [id number]=623 or greater to assist with ADL completion.        Start:  02/22/24    Expected End:  07/15/24            Left Shoulder ROM goals:   Flexion   120 Degrees , Abduction   120 Degrees  , Extension 55   Degrees  , Internal Rotation belt line          , External Rotation   35      Degrees        Start:  02/22/24    Expected End:  07/15/24            Patient will improve strengthening in order to perform self-care, household, work and or leisure tasks, Right/Left Shoulder Strength Goal: Flexion 4+/5 , Abduction 4+/5  , Extension 5/5  ,Internal Rotation  3+/5 , External Rotation 3+/5          Start:  02/22/24    Expected End:  07/15/24               OT Goals       Pt will demonstrate in improve in function by improving to 20.0 on the quick dash to signify increased independence with ADL'S with decreased pain.         Start:  03/15/24    Expected End:  07/15/24

## 2024-03-19 ENCOUNTER — TREATMENT (OUTPATIENT)
Dept: OCCUPATIONAL THERAPY | Facility: CLINIC | Age: 74
End: 2024-03-19
Payer: MEDICARE

## 2024-03-19 DIAGNOSIS — M19.012 PRIMARY OSTEOARTHRITIS, LEFT SHOULDER: ICD-10-CM

## 2024-03-19 PROCEDURE — 97110 THERAPEUTIC EXERCISES: CPT | Mod: GO

## 2024-03-19 NOTE — PROGRESS NOTES
Occupational Therapy  Occupational Therapy Treatment    Patient Name: Klever Moran  MRN: [id number]  Today's Date: 3/19/2024  Time Calculation  Start Time: 0845  Stop Time: 0929  Time Calculation (min): 44 min           General Comment: Visit 7; onset 2-7-24; no auth    Assessment:  Joint mobility/ROM increased, flexibility increased, muscle fatigue increased.    Pt completed session with some difficulty.           Plan: Progress with POC, As tolerated and monitor home program.        Current Problem  1. Primary osteoarthritis, left shoulder  Follow Up In Occupational Therapy          Precautions No AAIR; R TSR precautions.        Subjective:   Patient reports No pain    Pain   0/10  Performing HEP?: Yes    Objective:   All exercises held for 10 seconds unless noted otherwise   Treatments:   This therapist instructed and demonstrated interventions to patient, patient completed the following under direct supervision of this therapist:      Therapeutic Exercise:   min  38 MINUTES  Therapeutic Exercise  Therapeutic Exercise Activity 1: AAER x 10  Therapeutic Exercise Activity 2: AROM ER x 10.  Therapeutic Exercise Activity 6: AROM Ext.  Therapeutic Exercise Activity 7: Scapular retraction x 10  Therapeutic Exercise Activity 8: shoulder rolls x10  Therapeutic Exercise Activity 9: AAEXT x10  Therapeutic Exercise Activity 10: dowel flexion  x 10  Therapeutic Exercise Activity 11: Supine flexion with straight arm x 10  Therapeutic Exercise Activity 12: Sidelying straight arm abd x 10  Therapeutic Exercise Activity 13: Pulley scaption x 10  Therapeutic Exercise Activity 14: Pulley abd x 10 and flexion x 10    Manual Therapy:       6 MINUTES  Manual Therapy  Manual Therapy Activity 1: Trigger release to infraspinatus, rhomboids and subscap for pain relief. Billed under there ex.         Education: Pt educated on AROM Ext, AROM ER, sidelying IR, and supine flexion. Pt also educated on straight arm abd in sidelying.        David Turner, OT, CHT    Active       OT Goals       Patient is independent with entire HEP.       Start:  02/22/24    Expected End:  07/15/24            Patient c/o 2/10 or less pain in L shoulder during ADL's and all exercises.        Start:  02/22/24    Expected End:  07/15/24            PROM: L shoulder [id number]=173 or greater to assist with ADL completion.        Start:  02/22/24    Expected End:  07/15/24            Left Shoulder ROM goals:   Flexion   120 Degrees , Abduction   120 Degrees  , Extension 55   Degrees  , Internal Rotation belt line          , External Rotation   35      Degrees        Start:  02/22/24    Expected End:  07/15/24            Patient will improve strengthening in order to perform self-care, household, work and or leisure tasks, Right/Left Shoulder Strength Goal: Flexion 4+/5 , Abduction 4+/5  , Extension 5/5  ,Internal Rotation  3+/5 , External Rotation 3+/5          Start:  02/22/24    Expected End:  07/15/24               OT Goals       Pt will demonstrate in improve in function by improving to 20.0 on the quick dash to signify increased independence with ADL'S with decreased pain.         Start:  03/15/24    Expected End:  07/15/24

## 2024-03-22 ENCOUNTER — TREATMENT (OUTPATIENT)
Dept: OCCUPATIONAL THERAPY | Facility: CLINIC | Age: 74
End: 2024-03-22
Payer: MEDICARE

## 2024-03-22 DIAGNOSIS — M19.012 PRIMARY OSTEOARTHRITIS, LEFT SHOULDER: Primary | ICD-10-CM

## 2024-03-22 PROCEDURE — 97110 THERAPEUTIC EXERCISES: CPT | Mod: GO,KX,CO

## 2024-03-22 PROCEDURE — 97140 MANUAL THERAPY 1/> REGIONS: CPT | Mod: GO,KX,CO

## 2024-03-22 ASSESSMENT — PAIN - FUNCTIONAL ASSESSMENT: PAIN_FUNCTIONAL_ASSESSMENT: 0-10

## 2024-03-22 ASSESSMENT — PAIN SCALES - GENERAL: PAINLEVEL_OUTOF10: 0 - NO PAIN

## 2024-03-22 NOTE — PROGRESS NOTES
"    Occupational Therapy  Occupational Therapy Treatment    Patient Name: Klever Moran \"Don\"  MRN: [id number]  Today's Date: 3/22/2024  Time Calculation  Start Time: 0843  Stop Time: 0930  Time Calculation (min): 47 min    Insurance:  Visit number: 8 of 28  Authorization info: no auth  Insurance Type: MCR  OT Last Visit  OT Received On: 03/22/24    General  General Comment: Visit 8; onset 2-7-24; no auth    Current Problem  1. Primary osteoarthritis, left shoulder  Follow Up In Occupational Therapy          Precautions:   Post-Surgical Precautions:  (No AAIR; R TSR precautions)    Medical History Form: medical History Reviewed (scanned into chart)    Subjective:   Performing HEP?: Yes    Pain  Pain Assessment: 0-10  Pain Score: 0 - No pain (I am a bit concerned, I have pain at the back of my arm whe I move certain ways since the block wore off.)    Objective:   Physical Observation:   Measurements:    Treatments:   This therapist instructed and demonstrated interventions to patient, patient completed the following under direct supervision of this therapist:    Therapeutic Exercise:   min  37 MINUTES  Therapeutic Exercise  Therapeutic Exercise Performed: Yes  Therapeutic Exercise Activity 1: AAER x 10  Therapeutic Exercise Activity 2: AROM ER x 10.  Therapeutic Exercise Activity 3: pendulums 2 minutes with 1#  Therapeutic Exercise Activity 6: AROM Ext. 15x  Therapeutic Exercise Activity 7: Scapular retraction x 10  Therapeutic Exercise Activity 8: shoulder rolls x10  Therapeutic Exercise Activity 9: AAEXT x10  Therapeutic Exercise Activity 11: Supine flexion with straight arm x 10  Therapeutic Exercise Activity 12: Sidelying straight arm abd x 10  Therapeutic Exercise Activity 13: Pulley scaption x 10  Therapeutic Exercise Activity 14: Pulley abd x 10 and flexion x 10    Manual Therapy:       10 MINUTES  Manual Therapy  Manual Therapy Performed: Yes  Manual Therapy Activity 1: Trigger release to infraspinatus, " rhomboids and subscap for pain relief.    Assessment: Tissue mobility increased, Joint mobility/ROM increased, flexibility increased, muscle fatigue increased, Pain decreased, muscle guarding decreased.      Plan:  To continue with current plan of care with emphasis on increased strength, ROM and functional use of left arm for increased ADL/IADL.  Patient to call if he has problems or concerns.       Active       OT Goals       Patient is independent with entire HEP.       Start:  02/22/24    Expected End:  07/15/24            Patient c/o 2/10 or less pain in L shoulder during ADL's and all exercises.        Start:  02/22/24    Expected End:  07/15/24            PROM: L shoulder [id number]=424 or greater to assist with ADL completion.        Start:  02/22/24    Expected End:  07/15/24            Left Shoulder ROM goals:   Flexion   120 Degrees , Abduction   120 Degrees  , Extension 55   Degrees  , Internal Rotation belt line          , External Rotation   35      Degrees        Start:  02/22/24    Expected End:  07/15/24            Patient will improve strengthening in order to perform self-care, household, work and or leisure tasks, Right/Left Shoulder Strength Goal: Flexion 4+/5 , Abduction 4+/5  , Extension 5/5  ,Internal Rotation  3+/5 , External Rotation 3+/5          Start:  02/22/24    Expected End:  07/15/24               OT Goals       Pt will demonstrate in improve in function by improving to 20.0 on the quick dash to signify increased independence with ADL'S with decreased pain.         Start:  03/15/24    Expected End:  07/15/24                FLACO Nicolas/JERRICA

## 2024-03-26 ENCOUNTER — TREATMENT (OUTPATIENT)
Dept: OCCUPATIONAL THERAPY | Facility: CLINIC | Age: 74
End: 2024-03-26
Payer: MEDICARE

## 2024-03-26 DIAGNOSIS — M25.412 PAIN AND SWELLING OF LEFT SHOULDER: Primary | ICD-10-CM

## 2024-03-26 DIAGNOSIS — M25.512 PAIN AND SWELLING OF LEFT SHOULDER: Primary | ICD-10-CM

## 2024-03-26 DIAGNOSIS — M19.012 PRIMARY OSTEOARTHRITIS, LEFT SHOULDER: ICD-10-CM

## 2024-03-26 PROCEDURE — 97110 THERAPEUTIC EXERCISES: CPT | Mod: GO,KX

## 2024-03-26 NOTE — PROGRESS NOTES
"      Occupational Therapy  Occupational Therapy Treatment    Patient Name: Klever Moran \"Don\"  MRN: [id number]  Today's Date: 3/26/2024  Time Calculation  Start Time: 1001           General  General Comment: Visit 9; onset 2-7-24; no auth      1. Pain and swelling of left shoulder        2. Primary osteoarthritis, left shoulder  Follow Up In Occupational Therapy               Patient is making good   progress towards goals     To continue with current plan of care with emphasis on additional UE strengthening. Patient to call if has problems.       Active       OT Goals       Patient is independent with entire HEP.       Start:  02/22/24    Expected End:  07/15/24            Patient c/o 2/10 or less pain in L shoulder during ADL's and all exercises.        Start:  02/22/24    Expected End:  07/15/24            PROM: L shoulder [id number] or greater to assist with ADL completion.        Start:  02/22/24    Expected End:  07/15/24            Left Shoulder ROM goals:   Flexion   120 Degrees , Abduction   120 Degrees  , Extension 55   Degrees  , Internal Rotation belt line          , External Rotation   35      Degrees        Start:  02/22/24    Expected End:  07/15/24            Patient will improve strengthening in order to perform self-care, household, work and or leisure tasks, Right/Left Shoulder Strength Goal: Flexion 4+/5 , Abduction 4+/5  , Extension 5/5  ,Internal Rotation  3+/5 , External Rotation 3+/5          Start:  02/22/24    Expected End:  07/15/24               OT Goals       Pt will demonstrate in improve in function by improving to 20.0 on the quick dash to signify increased independence with ADL'S with decreased pain.         Start:  03/15/24    Expected End:  07/15/24                Planned Interventions include: therapeutic exercise, therapeutic activity, self-care home management, manual therapy, therapeutic activities, gait training, neuromuscular coordination, vasopneumatic, dry needling, " "aquatic therapy, electric stimulation, fluidotherapy, ultrasound, kinesiotaping, orthosis fabrication, wound care      Precautions  Post-Surgical Precautions:  (No AAIR; R TSR precautions)  Precautions Comment: 6 weeks and 6 days      Subjective:   \"I'm doing more and more with it, unintentionally\"    Time Calculation  Start Time: 1001    Performing HEP?: Yes           Patient completed treatment with some difficulty and max effort  Obective:  Physical Observation: weakness noted       Treatments:   This therapist instructed and demonstrated interventions to patient, patient completed the following under direct supervision of this therapist:                   Modalities  Modalities Used: Yes        MINUTES  Therapeutic Exercise  Therapeutic Exercise Performed: Yes  Therapeutic Exercise Activity 1: AAER x 10 with dowel masoud and 10 second hold for stretch.  Therapeutic Exercise Activity 2: Place and hold ER for 5 seconds to gain active ROM, x10 (with dowel masoud)  Therapeutic Exercise Activity 3: pendulums 10 reps each of the 4 directions. with 4#  Therapeutic Exercise Activity 4: Arm hang with 6# weigtht to stretch ligaments and decrease referral pain in upper arm, 3 at 1 minute.  Therapeutic Exercise Activity 5: AROM in standing for ER (in 0 abduction). (after stretch and place and holds)  Therapeutic Exercise Activity 6: AAROM Ext.  with dowel masoud, 10 second stretch, x10 each.  Therapeutic Exercise Activity 7: Scapular retraction with depression and press and pull back on table x 10 seconds, x10 each.  Therapeutic Exercise Activity 8: shoulder rolls x10 each direction  Therapeutic Exercise Activity 9: bicep isometrics, x10 with a 10 second hold  Therapeutic Exercise Activity 10: Active extension in standing with 5 second holds, x10 each.  Therapeutic Exercise Activity 11: Supine flexion with straight arm x 10 with resisted extension to avoid reflexive action of the trap and deltoid from causing pain. (pain no " longer)  Therapeutic Exercise Activity 15: Hammer curls isometrics, x10 with 10 secon holds.  Therapeutic Exercise Activity 16: forearm curls isometrics, x10 with 10 seond holds               Lali Lamb, OTR/L #[id number], CHT

## 2024-03-29 ENCOUNTER — TREATMENT (OUTPATIENT)
Dept: OCCUPATIONAL THERAPY | Facility: CLINIC | Age: 74
End: 2024-03-29
Payer: MEDICARE

## 2024-03-29 DIAGNOSIS — M19.012 PRIMARY OSTEOARTHRITIS, LEFT SHOULDER: ICD-10-CM

## 2024-03-29 PROCEDURE — 97110 THERAPEUTIC EXERCISES: CPT | Mod: GO,CO,KX

## 2024-03-29 ASSESSMENT — PAIN - FUNCTIONAL ASSESSMENT: PAIN_FUNCTIONAL_ASSESSMENT: 0-10

## 2024-03-29 ASSESSMENT — PAIN SCALES - GENERAL: PAINLEVEL_OUTOF10: 4

## 2024-03-29 NOTE — PROGRESS NOTES
"    Occupational Therapy  Occupational Therapy Treatment    Patient Name: Klever Moran \"Don\"  MRN: [id number]  Today's Date: 3/29/2024  Time Calculation  Start Time: 0846  Stop Time: 0926  Time Calculation (min): 40 min    Insurance:  Visit number: 10 of 24  Authorization info: none  Insurance Type: MCR  OT Last Visit  OT Received On: 03/29/24    General  Reason for visit: Shoulder weakness, and decreased ROM.  General  General Comment: Visit 10; onset 2-7-24; no auth    Current Problem  1. Primary osteoarthritis, left shoulder  Follow Up In Occupational Therapy          Precautions:  Post-Surgical Precautions:  (No AAIR; R TSR precautions)  Precautions Comment: 7 weeks 1 day    Medical History Form: medical History Reviewed (scanned into chart)    Subjective:   Chief Complaint: Pain improving  Onset: surg    Performing HEP?: Yes    Pain  Pain Assessment: 0-10  Pain Score: 4 (\"Just at the back of my arm and that's getting better with the exercises.\")    Objective:   Physical Observation:   Measurements:    Treatments:   This therapist instructed and demonstrated interventions to patient, patient completed the following under direct supervision of this therapist:    Therapeutic Exercise:   min  40 MINUTES  Therapeutic Exercise  Therapeutic Exercise Performed: Yes  Therapeutic Exercise Activity 1: AAER x 10 with dowel masoud and 10 second hold for stretch.  Therapeutic Exercise Activity 2: Place and hold ER for 5 seconds to gain active ROM, x10 (with dowel masoud)  Therapeutic Exercise Activity 3: pendulums 10 reps each of the 4 directions. with 4#  Therapeutic Exercise Activity 4: Arm hang with 6# weigtht to stretch ligaments and decrease referral pain in upper arm, x3 1 minute each with 1 minute rest intervals between.  Therapeutic Exercise Activity 5: AROM in standing for ER (in 0 abduction). (following use of place and hold)  Therapeutic Exercise Activity 6: AAROM Ext.  with dowel masoud, 10 second stretch, x10 each. " (using doorway to control neutral spine)  Therapeutic Exercise Activity 7: Scapular retraction with depression and press and pull back hands on table x 10 seconds, x10 each.  Therapeutic Exercise Activity 8: shoulder rolls x10 each direction  Therapeutic Exercise Activity 10: Active extension in standing with 5 second holds, x10 each.  Therapeutic Exercise Activity 11: Supine flexion with straight arm x 10 with resisted extension to avoid reflexive action of the trap and deltoid from causing pain. (Reviewed technique with reminders for HEP.)  Therapeutic Exercise Activity 12: Sidelying bent arm abd x 10 (with oppositte hand support in pivot point)  Therapeutic Exercise Activity 15: Hammer curls isometrics, x10 with 10 second holds, seated.  Therapeutic Exercise Activity 16: forearm curls isometrics, x10 with 10 seond holds, seated.    Assessment: Tissue mobility increased, Joint mobility/ROM increased, flexibility increased, muscle fatigue increased, Pain decreased, muscle guarding decreased. Pt able to accurately demo techniques for HEP.       Plan:  To continue with current plan of care with emphasis on increased strength and ROM with decreased pain for increased independent ADL/IADL.  Patient to call if has problems.     Active       OT Goals       Patient is independent with entire HEP.       Start:  02/22/24    Expected End:  07/15/24            Patient c/o 2/10 or less pain in L shoulder during ADL's and all exercises.        Start:  02/22/24    Expected End:  07/15/24            PROM: L shoulder mqprytr=655 or greater to assist with ADL completion.        Start:  02/22/24    Expected End:  07/15/24            Left Shoulder ROM goals:   Flexion   120 Degrees , Abduction   120 Degrees  , Extension 55   Degrees  , Internal Rotation belt line          , External Rotation   35      Degrees        Start:  02/22/24    Expected End:  07/15/24            Patient will improve strengthening in order to perform  self-care, household, work and or leisure tasks, Right/Left Shoulder Strength Goal: Flexion 4+/5 , Abduction 4+/5  , Extension 5/5  ,Internal Rotation  3+/5 , External Rotation 3+/5          Start:  02/22/24    Expected End:  07/15/24               OT Goals       Pt will demonstrate in improve in function by improving to 20.0 on the quick dash to signify increased independence with ADL'S with decreased pain.         Start:  03/15/24    Expected End:  07/15/24              FLACO Nicolas/JERRICA

## 2024-04-02 ENCOUNTER — HOSPITAL ENCOUNTER (OUTPATIENT)
Dept: RADIOLOGY | Facility: CLINIC | Age: 74
Discharge: HOME | End: 2024-04-02
Payer: MEDICARE

## 2024-04-02 ENCOUNTER — OFFICE VISIT (OUTPATIENT)
Dept: ORTHOPEDIC SURGERY | Facility: CLINIC | Age: 74
End: 2024-04-02
Payer: MEDICARE

## 2024-04-02 DIAGNOSIS — M19.012 OSTEOARTHRITIS OF LEFT SHOULDER, UNSPECIFIED OSTEOARTHRITIS TYPE: ICD-10-CM

## 2024-04-02 PROCEDURE — 1160F RVW MEDS BY RX/DR IN RCRD: CPT | Performed by: ORTHOPAEDIC SURGERY

## 2024-04-02 PROCEDURE — 73030 X-RAY EXAM OF SHOULDER: CPT | Mod: LT

## 2024-04-02 PROCEDURE — 99024 POSTOP FOLLOW-UP VISIT: CPT | Performed by: ORTHOPAEDIC SURGERY

## 2024-04-02 PROCEDURE — 73030 X-RAY EXAM OF SHOULDER: CPT | Mod: LEFT SIDE | Performed by: RADIOLOGY

## 2024-04-02 PROCEDURE — 1157F ADVNC CARE PLAN IN RCRD: CPT | Performed by: ORTHOPAEDIC SURGERY

## 2024-04-02 PROCEDURE — 1036F TOBACCO NON-USER: CPT | Performed by: ORTHOPAEDIC SURGERY

## 2024-04-02 PROCEDURE — 1159F MED LIST DOCD IN RCRD: CPT | Performed by: ORTHOPAEDIC SURGERY

## 2024-04-02 ASSESSMENT — PAIN - FUNCTIONAL ASSESSMENT: PAIN_FUNCTIONAL_ASSESSMENT: 0-10

## 2024-04-02 ASSESSMENT — PAIN SCALES - GENERAL: PAINLEVEL_OUTOF10: 0 - NO PAIN

## 2024-04-02 NOTE — PROGRESS NOTES
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 Patient is doing well overall. X-rays today show excellent alignment of the prosthesis. Incision looks excellent. He is doing very well and has a few more weeks of therapy. We discussed being gentle with the shoulder and no heavy lifting for 2 more months. Follow-up in 8 weeks with repeat X-rays.     Assessment   Encounter Diagnosis   Name Primary?    Osteoarthritis of left shoulder, unspecified osteoarthritis type      I, Heike Diaz, attgerardo that this documentation has been prepared under the direction and in the presence of Josué Blanco MD. By signing below, I, Josué Blanco MD, personally performed the services described in this documentation. All medical record entries made by the scribe were at my direction and in my presence. I have reviewed the chart and agree that the record reflects my personal performance and is accurate and complete. 04/02/24

## 2024-04-03 ENCOUNTER — TREATMENT (OUTPATIENT)
Dept: OCCUPATIONAL THERAPY | Facility: CLINIC | Age: 74
End: 2024-04-03
Payer: MEDICARE

## 2024-04-03 DIAGNOSIS — M19.012 PRIMARY OSTEOARTHRITIS, LEFT SHOULDER: ICD-10-CM

## 2024-04-03 PROCEDURE — 97110 THERAPEUTIC EXERCISES: CPT | Mod: GO,KX

## 2024-04-03 ASSESSMENT — PAIN SCALES - GENERAL: PAINLEVEL_OUTOF10: 0 - NO PAIN

## 2024-04-03 NOTE — PROGRESS NOTES
"      Occupational Therapy  Occupational Therapy Treatment    Patient Name: Klever Moran \"Don\"  MRN: [id number]  Today's Date: 4/3/2024  Time Calculation  Start Time: 0948  Stop Time: 1026  Time Calculation (min): 38 min           General  General Comment: Visit 11; onset 2-7-24; no auth      1. Primary osteoarthritis, left shoulder  Follow Up In Occupational Therapy               Patient is making good progress towards goals     To continue with current plan of care with emphasis on strength and ROM.  Patient to call if has problems.       Active       OT Goals       Patient is independent with entire HEP.       Start:  02/22/24    Expected End:  07/15/24            Patient c/o 2/10 or less pain in L shoulder during ADL's and all exercises.        Start:  02/22/24    Expected End:  07/15/24            PROM: L shoulder [id number]=818 or greater to assist with ADL completion.        Start:  02/22/24    Expected End:  07/15/24            Left Shoulder ROM goals:   Flexion   120 Degrees , Abduction   120 Degrees  , Extension 55   Degrees  , Internal Rotation belt line          , External Rotation   35      Degrees        Start:  02/22/24    Expected End:  07/15/24            Patient will improve strengthening in order to perform self-care, household, work and or leisure tasks, Right/Left Shoulder Strength Goal: Flexion 4+/5 , Abduction 4+/5  , Extension 5/5  ,Internal Rotation  3+/5 , External Rotation 3+/5          Start:  02/22/24    Expected End:  07/15/24               OT Goals       Pt will demonstrate in improve in function by improving to 20.0 on the quick dash to signify increased independence with ADL'S with decreased pain.         Start:  03/15/24    Expected End:  07/15/24                    Precautions  Post-Surgical Precautions:  (No AAIR; R TSR precautions)  Precautions Comment: 8 weeks      Subjective:   Reports having Mohs surgery yesterday, unable to lie on his back at this time.      Performing HEP?: " Yes      Pain Score: 0 - No pain    Patient completed treatment with some difficulty and max effort.  Obective:  Physical Observation:        Treatments:   This therapist instructed and demonstrated interventions to patient, patient completed the following under direct supervision of this therapist:          38 MINUTES  Therapeutic Exercise  Therapeutic Exercise Performed: Yes  Therapeutic Exercise Activity 1: Prone extension in supination, x10 with 5 second extension  Therapeutic Exercise Activity 2: Prone extension in pronation, x10 with 5 seconds holds in extension.  Therapeutic Exercise Activity 6: trap/scalene stretch 3 at 30 seconds each side.  Therapeutic Exercise Activity 7: Scapular retraction with depression and press and pull back hands on table x 10 seconds, x10 each.  Therapeutic Exercise Activity 8: shoulder rolls x10 each direction  Therapeutic Exercise Activity 10: extension isopmetrics in standing with 10 second holds, x10 each.  Therapeutic Exercise Activity 12: Sidelying bent arm abd x 10 (with oppositte hand support in pivot point)  Therapeutic Exercise Activity 14: Bicep curls with green theraband, x15  Therapeutic Exercise Activity 15: Hammer curls green t-band, x15  Therapeutic Exercise Activity 16: forearm curls with green t-band, x15        MINUTES  Therapeutic Activity  Therapeutic Activity 1: removed post-op bandage, cleaned with regine and place mepilex border on.  No redness and scant amount of serosang fluid noted.    Lali Lamb, OT/L #[id number], CHT

## 2024-04-05 ENCOUNTER — TREATMENT (OUTPATIENT)
Dept: OCCUPATIONAL THERAPY | Facility: CLINIC | Age: 74
End: 2024-04-05
Payer: MEDICARE

## 2024-04-05 DIAGNOSIS — M19.012 PRIMARY OSTEOARTHRITIS, LEFT SHOULDER: ICD-10-CM

## 2024-04-05 PROCEDURE — 97110 THERAPEUTIC EXERCISES: CPT | Mod: GO,CO,KX

## 2024-04-05 PROCEDURE — 97140 MANUAL THERAPY 1/> REGIONS: CPT | Mod: GO,CO,KX

## 2024-04-05 ASSESSMENT — PAIN - FUNCTIONAL ASSESSMENT: PAIN_FUNCTIONAL_ASSESSMENT: 0-10

## 2024-04-05 ASSESSMENT — PAIN SCALES - GENERAL: PAINLEVEL_OUTOF10: 0 - NO PAIN

## 2024-04-05 NOTE — PROGRESS NOTES
"    Occupational Therapy  Occupational Therapy Treatment    Patient Name: Klever Moran \"Don\"  MRN: [id number]  Today's Date: 4/5/2024  Time Calculation  Start Time: 1403  Stop Time: 1445  Time Calculation (min): 42 min    Insurance:  Visit number: 12 of 28  Insurance Type: MCR  OT Last Visit  OT Received On: 04/05/24    General  Reason for visit: Post-op reverse TSR  General  General Comment: Visit 12; onset 2-7-24; no auth    Current Problem  1. Primary osteoarthritis, left shoulder  Follow Up In Occupational Therapy          Precautions:   Post-Surgical Precautions:  (No AAIR; R TSR precautions)  Precautions Comment: 8 weeks    Medical History Form: medical History Reviewed (scanned into chart)    Subjective:   \"I'm doing my exercises and things are getting easier. I have no pain\"    Performing HEP?: Yes    Pain  Pain Assessment: 0-10  Pain Score: 0 - No pain    Objective:   Physical Observation:   Measurements:    Treatments:   This therapist instructed and demonstrated interventions to patient, patient completed the following under direct supervision of this therapist:    Therapeutic Exercise:   min  30 MINUTES  Therapeutic Exercise  Therapeutic Exercise Performed: Yes  Therapeutic Exercise Activity 1: Prone extension in supination, x10 with 5 second extension  Therapeutic Exercise Activity 2: Prone extension in pronation, x10 with 5 seconds holds in extension.  Therapeutic Exercise Activity 6: trap/scalene stretch 3 at 30 seconds each side.  Therapeutic Exercise Activity 7: Scapular retraction with depression and press and pull back hands on table x 10 seconds, x10 each.  Therapeutic Exercise Activity 8: shoulder rolls x10 each direction  Therapeutic Exercise Activity 10: extension isometrics ball to wall in standing with 10 second holds, x10 each.  Therapeutic Exercise Activity 12: Sidelying bent arm abd x 10 (with oppositte hand support in pivot point) (Posterior scapular assist with stretch)  Therapeutic " Exercise Activity 14: Bicep curls with green theraband, 2 x10 with 3 count holds  Therapeutic Exercise Activity 15: Hammer curls green t-band, 2x 10 with 3 count hold  Therapeutic Exercise Activity 16: forearm curls with green t-band, 2x10 with 3 count holds    Manual Therapy:       12 MINUTES  Manual Therapy  Manual Therapy Performed: Yes  Manual Therapy Activity 1: Scar massage with and without lotion with use of dycem for cross hitch. Dycem square issued for home use.  Manual Therapy Activity 2: Mini massager to scar margins      Assessment:   Patient is making good progress towards goals      Plan:  To continue with current plan of care with emphasis on strength and ROM.  Patient to call if has problems.       Active       OT Goals       Patient is independent with entire HEP.       Start:  02/22/24    Expected End:  07/15/24            Patient c/o 2/10 or less pain in L shoulder during ADL's and all exercises.        Start:  02/22/24    Expected End:  07/15/24            PROM: L shoulder natuipy=331 or greater to assist with ADL completion.        Start:  02/22/24    Expected End:  07/15/24            Left Shoulder ROM goals:   Flexion   120 Degrees , Abduction   120 Degrees  , Extension 55   Degrees  , Internal Rotation belt line          , External Rotation   35      Degrees        Start:  02/22/24    Expected End:  07/15/24            Patient will improve strengthening in order to perform self-care, household, work and or leisure tasks, Right/Left Shoulder Strength Goal: Flexion 4+/5 , Abduction 4+/5  , Extension 5/5  ,Internal Rotation  3+/5 , External Rotation 3+/5          Start:  02/22/24    Expected End:  07/15/24               OT Goals       Pt will demonstrate in improve in function by improving to 20.0 on the quick dash to signify increased independence with ADL'S with decreased pain.         Start:  03/15/24    Expected End:  07/15/24              FLACO Nicolas/JERRICA

## 2024-04-09 ENCOUNTER — TREATMENT (OUTPATIENT)
Dept: OCCUPATIONAL THERAPY | Facility: CLINIC | Age: 74
End: 2024-04-09
Payer: MEDICARE

## 2024-04-09 DIAGNOSIS — M19.012 PRIMARY OSTEOARTHRITIS, LEFT SHOULDER: ICD-10-CM

## 2024-04-09 PROCEDURE — 97530 THERAPEUTIC ACTIVITIES: CPT | Mod: GO,KX

## 2024-04-09 PROCEDURE — 97014 ELECTRIC STIMULATION THERAPY: CPT | Mod: GO,KX

## 2024-04-09 NOTE — PROGRESS NOTES
"      Occupational Therapy  Occupational Therapy Treatment    Patient Name: Klever Moran \"Don\"  MRN: [id number]  Today's Date: 4/9/2024  Time Calculation  Start Time: 1103  Stop Time: 1143  Time Calculation (min): 40 min           General  General Comment: Visit 13; onset 2-7-24; no auth  8 weeks, 6 days today      1. Primary osteoarthritis, left shoulder  Follow Up In Occupational Therapy               Patient is making good  progress towards goals, minor set back this date.    To continue with current plan of care with emphasis on experiencing zero pain and strengthening.  Patient to call if has problems.       Active       OT Goals       Patient is independent with entire HEP.       Start:  02/22/24    Expected End:  07/15/24            Patient c/o 2/10 or less pain in L shoulder during ADL's and all exercises.        Start:  02/22/24    Expected End:  07/15/24            PROM: L shoulder incsayl=528 or greater to assist with ADL completion.        Start:  02/22/24    Expected End:  07/15/24            Left Shoulder ROM goals:   Flexion   120 Degrees , Abduction   120 Degrees  , Extension 55   Degrees  , Internal Rotation belt line          , External Rotation   35      Degrees        Start:  02/22/24    Expected End:  07/15/24            Patient will improve strengthening in order to perform self-care, household, work and or leisure tasks, Right/Left Shoulder Strength Goal: Flexion 4+/5 , Abduction 4+/5  , Extension 5/5  ,Internal Rotation  3+/5 , External Rotation 3+/5          Start:  02/22/24    Expected End:  07/15/24               OT Goals       Pt will demonstrate in improve in function by improving to 20.0 on the quick dash to signify increased independence with ADL'S with decreased pain.         Start:  03/15/24    Expected End:  07/15/24                    Precautions  Post-Surgical Precautions:  (No AAIR; R TSR precautions)  Precautions Comment: 8 weeks      Subjective:   C/o pain at posterior " "aspect lateral and inferior to scapulae      Performing HEP?: PARTIALLY SECONDARY TO NEW PAIN ONSET (FRIDAY)           Patient completed treatment withOUT difficulty.  End of treatment, able to relax shoulder without pain.  Obective:  Physical Observation: none noted this date    Education  Education Provided:  (scar management techniques)  Home Program: Wound/scar care  Patient Response to Education: Patient/Caregiver Verbalized Understanding of Information, Patient/Caregiver Performed Return Demonstration of Exercises/Activities, Patient/Caregiver Asked Appropriate Questions  Treatments:   This therapist instructed and demonstrated interventions to patient, patient completed the following under direct supervision of this therapist:                30 (Opiate IFC to left rear deltoid) minutes of E-stim            MINUTES with e-stim  Therapeutic Exercise  Therapeutic Exercise Performed: No  Therapeutic Exercise Activity 7: Scapular retraction with depression and press and pull back hands on table x 10 seconds, x10 each.  Therapeutic Exercise Activity 8: shoulder rolls x10 each direction  Therapeutic Exercise Activity 12:  (Posterior scapular assist with stretch)            8 MINUTES  Therapeutic Activity  Therapeutic Activity 1: Discussed with patient his \"new pain\" in the posterior aspect of his shoulder at rest.  Very sharp, however, is concerning for this therapist.  Instructed pendulum, shoulder rolls and scap retraction with depression okay to continue, but do not perform others until seen in clinic again.      Lali Lamb, OTR/L #[id number], CHT  "

## 2024-04-12 ENCOUNTER — TREATMENT (OUTPATIENT)
Dept: OCCUPATIONAL THERAPY | Facility: CLINIC | Age: 74
End: 2024-04-12
Payer: MEDICARE

## 2024-04-12 DIAGNOSIS — M19.012 PRIMARY OSTEOARTHRITIS, LEFT SHOULDER: ICD-10-CM

## 2024-04-12 PROCEDURE — 97530 THERAPEUTIC ACTIVITIES: CPT | Mod: GO,CO,KX

## 2024-04-12 PROCEDURE — 97014 ELECTRIC STIMULATION THERAPY: CPT | Mod: GO,KX,CO

## 2024-04-12 ASSESSMENT — PAIN - FUNCTIONAL ASSESSMENT: PAIN_FUNCTIONAL_ASSESSMENT: 0-10

## 2024-04-12 ASSESSMENT — PAIN SCALES - GENERAL: PAINLEVEL_OUTOF10: 0 - NO PAIN

## 2024-04-12 NOTE — PROGRESS NOTES
"    Occupational Therapy  Occupational Therapy Treatment    Patient Name: Klever Moran \"Don\"  MRN: [id number]  Today's Date: 4/12/2024  Time Calculation  Start Time: 0817  Stop Time: 0859  Time Calculation (min): 42 min    Insurance:  Visit number: 14 of 28  Authorization info: None  Insurance Type: MCR  OT Last Visit: 04/09/24  OT Received On: 04/12/24    General  Reason for visit: S/P Reverse TSR left  General  General Comment: Visit 14; onset 2-7-24; no auth  9 1/2 weeks    Current Problem  1. Primary osteoarthritis, left shoulder  Follow Up In Occupational Therapy          Precautions:   UE Weight Bearing Status: Left Non-Weight Bearing  Medical Precautions: Fall precautions  Precautions Comment: 9 1/2 weeks    Medical History Form: medical History Reviewed (scanned into chart)    Subjective:   My shoulder felt better a day after my session last time. Stayed good until today.      Performing HEP?: Yes    Pain  Pain Assessment: 0-10  Pain Score: 0 - No pain (There's no pain today, it went away.)  Location: Posterior shoulder - (pain reellicited in there ex. With immediate cessation of tabletop press.  Description: dull achy pain    Objective:   Physical Observation:   Measurements:    Treatments:   This therapist instructed and demonstrated interventions to patient, patient completed the following under direct supervision of this therapist:  Modalities:      22 min (Opiate IFC with cold to rear deltoid.)       Therapeutic Exercise:   min  10 MINUTES  Therapeutic Exercise  Therapeutic Exercise Activity 3: pendulums 10 reps each of the 4 directions. with 4#  Therapeutic Exercise Activity 7: Scapular retraction with depression and press and pull back hands on table x 10 seconds, x10 each.  Therapeutic Exercise Activity 8: shoulder rolls x10 each direction    Therapeutic Activity:      10 MINUTES  Therapeutic Activity  Therapeutic Activity 1: Pt reeducated on pendulum d/t slow pace with weight and need for weight " to control speed to avoid an isometric or isotonic effect. (Pt demonstrated good understanding of same. Good correction.)    Assessment:  Continues to have pain with some exercises. Eliminated scapular strengthening beyond prone extension sets.    Plan:  To continue with current plan of care and modified HEP. Patient to call if he has problems.       Active       OT Goals       Patient is independent with entire HEP.       Start:  02/22/24    Expected End:  07/15/24            Patient c/o 2/10 or less pain in L shoulder during ADL's and all exercises.        Start:  02/22/24    Expected End:  07/15/24            PROM: L shoulder asvdodc=603 or greater to assist with ADL completion.        Start:  02/22/24    Expected End:  07/15/24            Left Shoulder ROM goals:   Flexion   120 Degrees , Abduction   120 Degrees  , Extension 55   Degrees  , Internal Rotation belt line          , External Rotation   35      Degrees        Start:  02/22/24    Expected End:  07/15/24            Patient will improve strengthening in order to perform self-care, household, work and or leisure tasks, Right/Left Shoulder Strength Goal: Flexion 4+/5 , Abduction 4+/5  , Extension 5/5  ,Internal Rotation  3+/5 , External Rotation 3+/5          Start:  02/22/24    Expected End:  07/15/24               OT Goals       Pt will demonstrate in improve in function by improving to 20.0 on the quick dash to signify increased independence with ADL'S with decreased pain.         Start:  03/15/24    Expected End:  07/15/24                  FLACO Nicolas/JERRICA

## 2024-04-16 ENCOUNTER — TREATMENT (OUTPATIENT)
Dept: OCCUPATIONAL THERAPY | Facility: CLINIC | Age: 74
End: 2024-04-16
Payer: MEDICARE

## 2024-04-16 DIAGNOSIS — M19.012 PRIMARY OSTEOARTHRITIS, LEFT SHOULDER: ICD-10-CM

## 2024-04-16 PROCEDURE — 97110 THERAPEUTIC EXERCISES: CPT | Mod: GO,KX

## 2024-04-16 NOTE — PROGRESS NOTES
Occupational Therapy  Occupational Therapy Treatment    Patient Name: Klever Moran  MRN: [id number]  Today's Date: 4/16/2024  Time Calculation  Start Time: 1151  Stop Time: 1232  Time Calculation (min): 41 min                Assessment: Tissue mobility increased , Joint mobility/ROM increased, flexibility increased, muscle fatigue increased.    Pt completed session with some difficulty.           Plan: Progress with POC, As tolerated and monitor home program.        Current Problem  1. Primary osteoarthritis, left shoulder  Follow Up In Occupational Therapy          Precautions   N/A     Subjective:   Patient reports It's much better than last week.     Pain   0/10    Performing HEP?: Yes    Objective:   All exercises held for 10 seconds unless noted otherwise   Treatments:   This therapist instructed and demonstrated interventions to patient, patient completed the following under direct supervision of this therapist:      Therapeutic Exercise:   min  41 MINUTES  Therapeutic Exercise  Therapeutic Exercise Activity 3: pendulums 10 reps each of the 4 directions 2 sets  Therapeutic Exercise Activity 4: AROM ER x 10  Therapeutic Exercise Activity 5: AROM across buttock IR  Therapeutic Exercise Activity 6: AROM up back IR x 10 in sidelying then 10 reps standing.  Therapeutic Exercise Activity 7: Scapular retraction with depression and press and pull back hands on table x 10 seconds, x10 each.  Therapeutic Exercise Activity 8: Supine flexion x 10  Therapeutic Exercise Activity 9: Sidelying abd x 10  Therapeutic Exercise Activity 12: Pulley flexion x 10  Therapeutic Exercise Activity 13: Pulley scaption x 10  Therapeutic Exercise Activity 14: Pulley abd x 10  Therapeutic Exercise Activity 15: flexion isometric x 10  Therapeutic Exercise Activity 16: Supination with arm extended in prep for abd iso x 10  Therapeutic Exercise Activity 17: Standing flexion x 10 with emphasis on scapular retraction x 10  Therapeutic  Exercise Activity 18: ER isometric x 10        Education: Pt educated on standing flexion, flexion isometric, and ABD isometric prep stretch.       David Turner, OT, CHT    Active       OT Goals       Patient is independent with entire HEP.       Start:  02/22/24    Expected End:  07/15/24            Patient c/o 2/10 or less pain in L shoulder during ADL's and all exercises.        Start:  02/22/24    Expected End:  07/15/24            PROM: L shoulder [id number]=052 or greater to assist with ADL completion.        Start:  02/22/24    Expected End:  07/15/24            Left Shoulder ROM goals:   Flexion   120 Degrees , Abduction   120 Degrees  , Extension 55   Degrees  , Internal Rotation belt line          , External Rotation   35      Degrees        Start:  02/22/24    Expected End:  07/15/24            Patient will improve strengthening in order to perform self-care, household, work and or leisure tasks, Right/Left Shoulder Strength Goal: Flexion 4+/5 , Abduction 4+/5  , Extension 5/5  ,Internal Rotation  3+/5 , External Rotation 3+/5          Start:  02/22/24    Expected End:  07/15/24               OT Goals       Pt will demonstrate in improve in function by improving to 20.0 on the quick dash to signify increased independence with ADL'S with decreased pain.         Start:  03/15/24    Expected End:  07/15/24

## 2024-04-19 ENCOUNTER — TREATMENT (OUTPATIENT)
Dept: OCCUPATIONAL THERAPY | Facility: CLINIC | Age: 74
End: 2024-04-19
Payer: MEDICARE

## 2024-04-19 DIAGNOSIS — M19.012 PRIMARY OSTEOARTHRITIS, LEFT SHOULDER: ICD-10-CM

## 2024-04-19 PROCEDURE — 97110 THERAPEUTIC EXERCISES: CPT | Mod: GO,KX

## 2024-04-19 NOTE — PROGRESS NOTES
Occupational Therapy  Occupational Therapy Treatment    Patient Name: Klever Moran  MRN: [id number]  Today's Date: 4/19/2024  Time Calculation  Start Time: 0845  Stop Time: 0924  Time Calculation (min): 39 min           General Comment: Visit 16; onset 2-7-24; no auth  10 weeks    Assessment:  Joint mobility/ROM increased, flexibility increased, muscle fatigue increased. Decreased compensation noted at shoulder with pulley completion.     Pt completed session with some difficulty.           Plan: Progress with POC, As tolerated and monitor home program.        Current Problem  1. Primary osteoarthritis, left shoulder  Follow Up In Occupational Therapy          Precautions Reverse TSA precautions       Subjective:   Patient reports No pain    Pain   0/10    Performing HEP?: Yes    Objective:   All exercises held for 10 seconds unless noted otherwise   Treatments:   This therapist instructed and demonstrated interventions to patient, patient completed the following under direct supervision of this therapist:        Therapeutic Exercise:   min  39 MINUTES  Therapeutic Exercise  Therapeutic Exercise Activity 3: pendulums 10 reps each of the 4 directions 2 sets  Therapeutic Exercise Activity 4: AROM ER x 10  Therapeutic Exercise Activity 6: AROM up back IR x 8 at 20 sec in sidelying then 10 reps standing at 10 sec.  Therapeutic Exercise Activity 7: Scapular retraction with depression and press and pull back hands on table x 10 seconds, x10 each.  Therapeutic Exercise Activity 8: Supine flexion x 10  Therapeutic Exercise Activity 9: Sidelying abd x 10  Therapeutic Exercise Activity 12: Pulley flexion x 10  Therapeutic Exercise Activity 13: Pulley scaption x 10  Therapeutic Exercise Activity 14: Pulley abd x 10  Therapeutic Exercise Activity 15: flexion isometric x 10  Therapeutic Exercise Activity 16: Arm Abd isometric x 10  Therapeutic Exercise Activity 19: Digit flex 7.0 10 x at neutral and 45 degrees flexion/abd 2  sets  Therapeutic Exercise Activity 20: Digit flex 5.0 10 x at neutral and 45 degrees flexion/abd 2 sets        Therapeutic Activity:        MINUTES  Therapeutic Activity  Therapeutic Activity 1: Shoulder arch one direction overhead.            David Turner, OT, CHT    Active       OT Goals       Patient is independent with entire HEP.       Start:  02/22/24    Expected End:  07/15/24            Patient c/o 2/10 or less pain in L shoulder during ADL's and all exercises.        Start:  02/22/24    Expected End:  07/15/24            PROM: L shoulder [id number]=496 or greater to assist with ADL completion.        Start:  02/22/24    Expected End:  07/15/24            Left Shoulder ROM goals:   Flexion   120 Degrees , Abduction   120 Degrees  , Extension 55   Degrees  , Internal Rotation belt line          , External Rotation   35      Degrees        Start:  02/22/24    Expected End:  07/15/24            Patient will improve strengthening in order to perform self-care, household, work and or leisure tasks, Right/Left Shoulder Strength Goal: Flexion 4+/5 , Abduction 4+/5  , Extension 5/5  ,Internal Rotation  3+/5 , External Rotation 3+/5          Start:  02/22/24    Expected End:  07/15/24               OT Goals       Pt will demonstrate in improve in function by improving to 20.0 on the quick dash to signify increased independence with ADL'S with decreased pain.         Start:  03/15/24    Expected End:  07/15/24

## 2024-04-23 ENCOUNTER — TREATMENT (OUTPATIENT)
Dept: OCCUPATIONAL THERAPY | Facility: CLINIC | Age: 74
End: 2024-04-23
Payer: MEDICARE

## 2024-04-23 DIAGNOSIS — M19.012 PRIMARY OSTEOARTHRITIS, LEFT SHOULDER: ICD-10-CM

## 2024-04-23 PROCEDURE — 97530 THERAPEUTIC ACTIVITIES: CPT | Mod: GO,KX

## 2024-04-23 PROCEDURE — 97110 THERAPEUTIC EXERCISES: CPT | Mod: GO,KX

## 2024-04-23 NOTE — PROGRESS NOTES
Occupational Therapy  Occupational Therapy Treatment    Patient Name: Klever Moran  MRN: [id number]  Today's Date: 4/23/2024  Time Calculation  Start Time: 0849  Stop Time: 0929  Time Calculation (min): 40 min           General Comment: Visit 17; onset 2-7-24; no auth    Assessment: Joint mobility/ROM increased, flexibility increased, muscle fatigue increased.  Pt completed session with some difficulty.           Plan: Progress with POC, As tolerated and monitor home program.        Current Problem  1. Primary osteoarthritis, left shoulder  Follow Up In Occupational Therapy          Precautions Reverse TS precautions.       Subjective:   Patient reports No Pain. I stopped doing the neck stretches, because it was hurting.     Pain   0/10    Performing HEP?: Yes    Objective:   All exercises held for 10 seconds unless noted otherwise   Treatments:   This therapist instructed and demonstrated interventions to patient, patient completed the following under direct supervision of this therapist:      Therapeutic Exercise:   min  32 MINUTES  Therapeutic Exercise  Therapeutic Exercise Activity 4: AROM ER x 10  Therapeutic Exercise Activity 6: AROM up back IR x 7 at 20 sec in sidelying then 10 reps standing at 10 sec.  Therapeutic Exercise Activity 7: Scapular retraction with depression and press and pull back hands on table x 10 seconds, x10 each.  Therapeutic Exercise Activity 8: Supine flexion x 5  Therapeutic Exercise Activity 9: Sidelying abd x 10  Therapeutic Exercise Activity 12: Pulley flexion x 10  Therapeutic Exercise Activity 13: Pulley scaption x 10  Therapeutic Exercise Activity 14: Pulley abd x 10  Therapeutic Exercise Activity 15: Flexion isometric x 10; 2 sets  Therapeutic Exercise Activity 16: Arm Abd isometric x 10; 2 sets.  Therapeutic Exercise Activity 18: ER T-band x 10 yellow; 2 sets      Therapeutic Activity:      8 MINUTES  Therapeutic Activity  Therapeutic Activity 1: Shoulder arch one direction  overhead      Education: Pt advanced to yellow t-band strength.       David Turner, OT, CHT    Active       OT Goals       Patient is independent with entire HEP.       Start:  02/22/24    Expected End:  07/15/24            Patient c/o 2/10 or less pain in L shoulder during ADL's and all exercises.        Start:  02/22/24    Expected End:  07/15/24            PROM: L shoulder [id number]=351 or greater to assist with ADL completion.        Start:  02/22/24    Expected End:  07/15/24            Left Shoulder ROM goals:   Flexion   120 Degrees , Abduction   120 Degrees  , Extension 55   Degrees  , Internal Rotation belt line          , External Rotation   35      Degrees        Start:  02/22/24    Expected End:  07/15/24            Patient will improve strengthening in order to perform self-care, household, work and or leisure tasks, Right/Left Shoulder Strength Goal: Flexion 4+/5 , Abduction 4+/5  , Extension 5/5  ,Internal Rotation  3+/5 , External Rotation 3+/5          Start:  02/22/24    Expected End:  07/15/24               OT Goals       Pt will demonstrate in improve in function by improving to 20.0 on the quick dash to signify increased independence with ADL'S with decreased pain.         Start:  03/15/24    Expected End:  07/15/24

## 2024-04-25 PROBLEM — R33.9 RETENTION OF URINE: Status: ACTIVE | Noted: 2024-04-25

## 2024-04-25 PROBLEM — K59.03 DRUG-INDUCED CONSTIPATION: Status: ACTIVE | Noted: 2024-04-25

## 2024-04-26 ENCOUNTER — TREATMENT (OUTPATIENT)
Dept: OCCUPATIONAL THERAPY | Facility: CLINIC | Age: 74
End: 2024-04-26
Payer: MEDICARE

## 2024-04-26 DIAGNOSIS — M19.012 PRIMARY OSTEOARTHRITIS, LEFT SHOULDER: ICD-10-CM

## 2024-04-26 PROCEDURE — 97110 THERAPEUTIC EXERCISES: CPT | Mod: GO,CO,KX

## 2024-04-26 PROCEDURE — 97530 THERAPEUTIC ACTIVITIES: CPT | Mod: GO,CO,KX

## 2024-04-26 ASSESSMENT — PAIN SCALES - GENERAL: PAINLEVEL_OUTOF10: 0 - NO PAIN

## 2024-04-26 ASSESSMENT — PAIN - FUNCTIONAL ASSESSMENT: PAIN_FUNCTIONAL_ASSESSMENT: 0-10

## 2024-04-26 NOTE — PROGRESS NOTES
"    Occupational Therapy  Occupational Therapy Treatment    Patient Name: Klever Moran \"Don\"  MRN: [id number]  Today's Date: 4/26/2024  Time Calculation  Start Time: 0945  Stop Time: 1026  Time Calculation (min): 41 min    Insurance:  Visit number: 18 of 28  Insurance Type: MCR  OT Last Visit  OT Received On: 04/26/24    General  General Comment: Visit 18; onset 2-7-24; no auth    Current Problem  1. Primary osteoarthritis, left shoulder  Follow Up In Occupational Therapy          Precautions: Reverse TSR       Medical History Form: medical History Reviewed (scanned into chart)    Subjective:   \"I wish I had known this was going to be so easy, I would have done this ten years ago.\"    Performing HEP?: Yes    Pain  Pain Assessment: 0-10  Pain Score: 0 - No pain      Objective:   Physical Observation:   Measurements:    Treatments:   This therapist instructed and demonstrated interventions to patient, patient completed the following under direct supervision of this therapist:    Therapeutic Exercise:   min  31 MINUTES  Therapeutic Exercise  Therapeutic Exercise Activity 4: AROM ER x 10  Therapeutic Exercise Activity 6: AROM up back IR x 7 at 20 sec in sidelying then 10 reps standing at 10 sec.  Therapeutic Exercise Activity 8: Supine flexion x 10  Therapeutic Exercise Activity 9: Sidelying abd x 10  Therapeutic Exercise Activity 12: Pulley flexion x 10  Therapeutic Exercise Activity 13: Pulley scaption x 10  Therapeutic Exercise Activity 14: Pulley abd x 10  Therapeutic Exercise Activity 15: Flexion isometric x 10; 2 sets  Therapeutic Exercise Activity 16: Arm Abd isometric x 10; 2 sets.  Therapeutic Exercise Activity 18: ER T-band x 10 yellow; 2 sets    Therapeutic Activity:      10 MINUTES  Therapeutic Activity  Therapeutic Activity 1: Shoulder arch overhead one direction 23 reps x2 sets with shoulder rolls and pendulumns between sets    Assessment: Joint mobility/ROM increased, flexibility increased, muscle " fatigue increased. Pt completed session with mild difficulty.        Plan: Progress with POC, As tolerated and monitor home program.        Active       OT Goals       Patient is independent with entire HEP.       Start:  02/22/24    Expected End:  07/15/24            Patient c/o 2/10 or less pain in L shoulder during ADL's and all exercises.        Start:  02/22/24    Expected End:  07/15/24            PROM: L shoulder [id number]=527 or greater to assist with ADL completion.        Start:  02/22/24    Expected End:  07/15/24            Left Shoulder ROM goals:   Flexion   120 Degrees , Abduction   120 Degrees  , Extension 55   Degrees  , Internal Rotation belt line          , External Rotation   35      Degrees        Start:  02/22/24    Expected End:  07/15/24            Patient will improve strengthening in order to perform self-care, household, work and or leisure tasks, Right/Left Shoulder Strength Goal: Flexion 4+/5 , Abduction 4+/5  , Extension 5/5  ,Internal Rotation  3+/5 , External Rotation 3+/5          Start:  02/22/24    Expected End:  07/15/24               OT Goals       Pt will demonstrate in improve in function by improving to 20.0 on the quick dash to signify increased independence with ADL'S with decreased pain.         Start:  03/15/24    Expected End:  07/15/24                FLACO Nicolas/JERRICA

## 2024-04-27 ENCOUNTER — HOSPITAL ENCOUNTER (EMERGENCY)
Facility: HOSPITAL | Age: 74
Discharge: HOME | End: 2024-04-27
Attending: STUDENT IN AN ORGANIZED HEALTH CARE EDUCATION/TRAINING PROGRAM
Payer: MEDICARE

## 2024-04-27 ENCOUNTER — APPOINTMENT (OUTPATIENT)
Dept: CARDIOLOGY | Facility: HOSPITAL | Age: 74
End: 2024-04-27
Payer: MEDICARE

## 2024-04-27 ENCOUNTER — APPOINTMENT (OUTPATIENT)
Dept: RADIOLOGY | Facility: HOSPITAL | Age: 74
End: 2024-04-27
Payer: MEDICARE

## 2024-04-27 VITALS
TEMPERATURE: 97.9 F | BODY MASS INDEX: 23.62 KG/M2 | WEIGHT: 165 LBS | RESPIRATION RATE: 18 BRPM | HEIGHT: 70 IN | SYSTOLIC BLOOD PRESSURE: 152 MMHG | HEART RATE: 87 BPM | DIASTOLIC BLOOD PRESSURE: 78 MMHG | OXYGEN SATURATION: 98 %

## 2024-04-27 DIAGNOSIS — I15.9 SECONDARY HYPERTENSION: ICD-10-CM

## 2024-04-27 DIAGNOSIS — R07.9 CHEST PAIN, UNSPECIFIED TYPE: Primary | ICD-10-CM

## 2024-04-27 LAB
ALBUMIN SERPL BCP-MCNC: 4.5 G/DL (ref 3.4–5)
ALP SERPL-CCNC: 68 U/L (ref 33–136)
ALT SERPL W P-5'-P-CCNC: 23 U/L (ref 10–52)
ANION GAP SERPL CALC-SCNC: 18 MMOL/L (ref 10–20)
AST SERPL W P-5'-P-CCNC: 32 U/L (ref 9–39)
BASOPHILS # BLD AUTO: 0.11 X10*3/UL (ref 0–0.1)
BASOPHILS NFR BLD AUTO: 1.3 %
BILIRUB SERPL-MCNC: 0.3 MG/DL (ref 0–1.2)
BUN SERPL-MCNC: 16 MG/DL (ref 6–23)
CALCIUM SERPL-MCNC: 9.2 MG/DL (ref 8.6–10.3)
CARDIAC TROPONIN I PNL SERPL HS: 5 NG/L (ref 0–20)
CARDIAC TROPONIN I PNL SERPL HS: 5 NG/L (ref 0–20)
CHLORIDE SERPL-SCNC: 104 MMOL/L (ref 98–107)
CO2 SERPL-SCNC: 22 MMOL/L (ref 21–32)
CREAT SERPL-MCNC: 0.96 MG/DL (ref 0.5–1.3)
EGFRCR SERPLBLD CKD-EPI 2021: 83 ML/MIN/1.73M*2
EOSINOPHIL # BLD AUTO: 0.78 X10*3/UL (ref 0–0.4)
EOSINOPHIL NFR BLD AUTO: 9.3 %
ERYTHROCYTE [DISTWIDTH] IN BLOOD BY AUTOMATED COUNT: 12.6 % (ref 11.5–14.5)
GLUCOSE SERPL-MCNC: 87 MG/DL (ref 74–99)
HCT VFR BLD AUTO: 46.7 % (ref 41–52)
HGB BLD-MCNC: 16 G/DL (ref 13.5–17.5)
IMM GRANULOCYTES # BLD AUTO: 0.01 X10*3/UL (ref 0–0.5)
IMM GRANULOCYTES NFR BLD AUTO: 0.1 % (ref 0–0.9)
LYMPHOCYTES # BLD AUTO: 2.99 X10*3/UL (ref 0.8–3)
LYMPHOCYTES NFR BLD AUTO: 35.8 %
MAGNESIUM SERPL-MCNC: 2.27 MG/DL (ref 1.6–2.4)
MCH RBC QN AUTO: 32.9 PG (ref 26–34)
MCHC RBC AUTO-ENTMCNC: 34.3 G/DL (ref 32–36)
MCV RBC AUTO: 96 FL (ref 80–100)
MONOCYTES # BLD AUTO: 0.77 X10*3/UL (ref 0.05–0.8)
MONOCYTES NFR BLD AUTO: 9.2 %
NEUTROPHILS # BLD AUTO: 3.7 X10*3/UL (ref 1.6–5.5)
NEUTROPHILS NFR BLD AUTO: 44.3 %
NRBC BLD-RTO: 0 /100 WBCS (ref 0–0)
PLATELET # BLD AUTO: 278 X10*3/UL (ref 150–450)
POTASSIUM SERPL-SCNC: 5.2 MMOL/L (ref 3.5–5.3)
PROT SERPL-MCNC: 7.3 G/DL (ref 6.4–8.2)
RBC # BLD AUTO: 4.86 X10*6/UL (ref 4.5–5.9)
SODIUM SERPL-SCNC: 139 MMOL/L (ref 136–145)
WBC # BLD AUTO: 8.4 X10*3/UL (ref 4.4–11.3)

## 2024-04-27 PROCEDURE — 36415 COLL VENOUS BLD VENIPUNCTURE: CPT | Performed by: STUDENT IN AN ORGANIZED HEALTH CARE EDUCATION/TRAINING PROGRAM

## 2024-04-27 PROCEDURE — 85025 COMPLETE CBC W/AUTO DIFF WBC: CPT | Performed by: STUDENT IN AN ORGANIZED HEALTH CARE EDUCATION/TRAINING PROGRAM

## 2024-04-27 PROCEDURE — 84484 ASSAY OF TROPONIN QUANT: CPT | Performed by: STUDENT IN AN ORGANIZED HEALTH CARE EDUCATION/TRAINING PROGRAM

## 2024-04-27 PROCEDURE — 71045 X-RAY EXAM CHEST 1 VIEW: CPT

## 2024-04-27 PROCEDURE — 96360 HYDRATION IV INFUSION INIT: CPT

## 2024-04-27 PROCEDURE — 93005 ELECTROCARDIOGRAM TRACING: CPT

## 2024-04-27 PROCEDURE — 83735 ASSAY OF MAGNESIUM: CPT | Performed by: STUDENT IN AN ORGANIZED HEALTH CARE EDUCATION/TRAINING PROGRAM

## 2024-04-27 PROCEDURE — 80053 COMPREHEN METABOLIC PANEL: CPT | Performed by: STUDENT IN AN ORGANIZED HEALTH CARE EDUCATION/TRAINING PROGRAM

## 2024-04-27 PROCEDURE — 99283 EMERGENCY DEPT VISIT LOW MDM: CPT | Mod: 25

## 2024-04-27 PROCEDURE — 71045 X-RAY EXAM CHEST 1 VIEW: CPT | Performed by: RADIOLOGY

## 2024-04-27 PROCEDURE — 2500000004 HC RX 250 GENERAL PHARMACY W/ HCPCS (ALT 636 FOR OP/ED): Performed by: STUDENT IN AN ORGANIZED HEALTH CARE EDUCATION/TRAINING PROGRAM

## 2024-04-27 RX ADMIN — SODIUM CHLORIDE, SODIUM LACTATE, POTASSIUM CHLORIDE, AND CALCIUM CHLORIDE 1000 ML: 600; 310; 30; 20 INJECTION, SOLUTION INTRAVENOUS at 20:20

## 2024-04-27 ASSESSMENT — LIFESTYLE VARIABLES
HAVE YOU EVER FELT YOU SHOULD CUT DOWN ON YOUR DRINKING: NO
HAVE PEOPLE ANNOYED YOU BY CRITICIZING YOUR DRINKING: NO
TOTAL SCORE: 0
EVER FELT BAD OR GUILTY ABOUT YOUR DRINKING: NO
EVER HAD A DRINK FIRST THING IN THE MORNING TO STEADY YOUR NERVES TO GET RID OF A HANGOVER: NO

## 2024-04-27 ASSESSMENT — PAIN - FUNCTIONAL ASSESSMENT: PAIN_FUNCTIONAL_ASSESSMENT: 0-10

## 2024-04-27 ASSESSMENT — PAIN SCALES - GENERAL
PAINLEVEL_OUTOF10: 0 - NO PAIN
PAINLEVEL_OUTOF10: 0 - NO PAIN

## 2024-04-27 NOTE — ED PROVIDER NOTES
HPI   Chief Complaint   Patient presents with    Chest Pain     With blurred vision, did not radiate anywhere felt like heart was racing       HPI     Patient is a pleasant 73-year-old male with no significant past medical history does not routinely take any medications presented to the emergency department today in the setting of chest discomfort that occurred around 1 PM.  States that this was occurring while he was riding his outside lawnmower.  He states earlier that he also did a 20-minute cycling session at the gym which caused no issue or discomfort.  States that he took a rollator afterwards as it felt kind of like reflux symptoms.  His discomfort lasted until 5 PM when it fully resolved.  States it was midsternal, burning-like.  Did not radiate to the shoulders, jaw or back.  No numbness, tingling.  He states he did have a little bit of blurry vision earlier when he was trying to look at his phone that is fully resolved at this time.  Patient denies any recent lifestyle changes, no recent trips travel or prolonged immobility.  No current pain at this time.  States that he has been told that his blood pressure is elevated when he is at the doctors however he checks it routinely at home and typically it is not elevated.  He did check at home prior to coming in today and noted that it was around 1 40-1 80 when he checked multiple times he a wrist monitor.               [place] Coma Scale Score: 15                     Patient History   Past Medical History:   Diagnosis Date    Arthritis 01 /01/2000    Cancer (Multi) 01/01/2000    Cochlear implant in place     L side / hearing aid worn    Encounter for screening for cardiovascular disorders 10/08/2019    Screening for abdominal aortic aneurysm    Encounter for screening for cardiovascular disorders 02/02/2021    Screening, heart disease, ischemic    GERD (gastroesophageal reflux disease)     Hyperlipidemia     Hypertension     Injury of conjunctiva and corneal  abrasion without foreign body, left eye, initial encounter 04/16/2021    Abrasion of left cornea, initial encounter    Occipital neuralgia 09/12/2018    Occipital neuralgia of right side    Other chest pain 04/16/2021    Chest pain, atypical    Other conditions influencing health status     Adverse Reaction To Anesthesia    Other malaise 04/16/2021    Malaise and fatigue    Personal history of other diseases of the musculoskeletal system and connective tissue 04/16/2021    History of neck pain    Personal history of other diseases of the nervous system and sense organs     History of hearing loss    Personal history of other specified conditions 06/05/2017    History of paresthesia    Personal history of other specified conditions 06/24/2020    History of weakness    PONV (postoperative nausea and vomiting)     Post laminectomy syndrome     Radiculopathy, lumbar region 09/10/2020    Lumbar radiculitis    Superficial foreign body of unspecified hand, initial encounter 04/16/2021    Acute foreign body of hand    Unspecified hearing loss, right ear 04/16/2021    Hearing loss of right ear, unspecified hearing loss type     Past Surgical History:   Procedure Laterality Date    ARTHROPLASTY Right 2010    Right thumb arthroplasty    ARTHROPLASTY Left 2011    Left thumb arthroplasty    BACK SURGERY  09/03/2015    CATARACT EXTRACTION Bilateral 2016    CERVICAL FUSION  2021    C3-C7    COCHLEAR IMPLANT  11/11/2022    COLONOSCOPY      2011, 2018    HAND SURGERY  07/08/2013    Hand Surgery                                                                                                                                                          HERNIA REPAIR  2009    Hernia Repair    JOINT REPLACEMENT  02/24/2022    KNEE ARTHROPLASTY  2022    KNEE SURGERY  2009    Knee Surgery Right    ROTATOR CUFF REPAIR Right     2012, 2013    SPINAL FUSION  2015    L4-S1 fusion     Family History   Problem Relation Name Age of Onset    Arthritis  Mother Mom     Cancer Mother Mom     Hearing loss Father Father      Social History     Tobacco Use    Smoking status: Former     Current packs/day: 0.00     Average packs/day: 2.0 packs/day for 15.0 years (30.0 ttl pk-yrs)     Types: Cigarettes     Start date: 1967     Quit date: 1977     Years since quittin.3    Smokeless tobacco: Never   Vaping Use    Vaping status: Never Used   Substance Use Topics    Alcohol use: Not Currently    Drug use: Never       Physical Exam   ED Triage Vitals   Temperature Heart Rate Respirations BP   24   36.6 °C (97.9 °F) 89 18 (!) 218/113      Pulse Ox Temp src Heart Rate Source Patient Position   24 -- 24 --   97 %  Monitor       BP Location FiO2 (%)     -- --             Physical Exam  Vitals and nursing note reviewed.   Constitutional:       General: He is not in acute distress.     Appearance: He is well-developed.   HENT:      Head: Normocephalic and atraumatic.   Eyes:      Conjunctiva/sclera: Conjunctivae normal.   Cardiovascular:      Rate and Rhythm: Normal rate and regular rhythm.      Heart sounds: No murmur heard.  Pulmonary:      Effort: Pulmonary effort is normal. No respiratory distress.      Breath sounds: Normal breath sounds.   Abdominal:      Palpations: Abdomen is soft.      Tenderness: There is no abdominal tenderness.   Musculoskeletal:         General: No swelling.      Cervical back: Neck supple.   Skin:     General: Skin is warm and dry.      Capillary Refill: Capillary refill takes less than 2 seconds.   Neurological:      Mental Status: He is alert.   Psychiatric:         Mood and Affect: Mood normal.         ED Course & MDM   ED Course as of 24 0103   Sat  EKG: Rate 86, AK 24, , QTc 41.  No STEMI.  Right bundle branch block compared with previous EKG from 2024 with previous visualized right bundle branch block.  Impression sinus  rhythm, RBBB []   1941 CBC and Auto Differential(!) []   2004 Troponin I Series, High Sensitivity (0, 1 HR)  First trop wnl []   2004 Comprehensive Metabolic Panel  Reviewed and wnl []   2004 Magnesium  wnl []   2009 XR chest 1 view  Reviewed no acute process [AH]   2106 Troponin I Series, High Sensitivity (0, 1 HR)  Delta trop negative []      ED Course User Index  [] Concha Aguilera MD         Diagnoses as of 04/28/24 0103   Chest pain, unspecified type   Secondary hypertension       Medical Decision Making  73-year-old male present to the emergency department as above.  Arrival hemodynamically he is hypertensive otherwise stable.  He is moving all extremities no current chest pain.  Moving all extremities.  2+ pulses symmetric in the bilateral upper and lower extremities.  He is alert and oriented x 3 no distress lungs symmetric and clear.  EKG demonstrates a right bundle which is compared from his previous EKG from January 2024 and similar in appearance.  Will get a cardiac evaluation given his presenting discomfort with risk factors including age and possible undiagnosed hypertension.  He is without symptoms at this time which is reassuring did make patient aware that if he does have recurrence of symptoms to alert us that we could reevaluate him with a repeat EKG as needed.  Patient is aware and understanding of plan.    Patient's troponins x 2 negative, symptoms improved on repeat assessment are stable.  No contin as on arrival he has no chest pain at this time.  Reassuring findings and he does have a follow-up this Monday with his primary care doctor.  Discussed return precautions including returns of chest pain, neurologic symptoms, numbness, tingling, headache, dizziness.  Discussed outpatient follow-up for hypertension and discussed need for likely starting antihype possible need for starting antihypertensives.  Patient ultimately comfortable with plan for discharge home outpatient follow-up  and given patient's close follow-up appointment and understanding of return precautions will be discharged home    Procedure  Procedures     Concha Aguilera MD  04/28/24 0100

## 2024-04-29 ENCOUNTER — OFFICE VISIT (OUTPATIENT)
Dept: PRIMARY CARE | Facility: CLINIC | Age: 74
End: 2024-04-29
Payer: MEDICARE

## 2024-04-29 VITALS
DIASTOLIC BLOOD PRESSURE: 72 MMHG | OXYGEN SATURATION: 96 % | WEIGHT: 165 LBS | BODY MASS INDEX: 23.62 KG/M2 | HEIGHT: 70 IN | SYSTOLIC BLOOD PRESSURE: 148 MMHG | HEART RATE: 72 BPM

## 2024-04-29 DIAGNOSIS — I10 PRIMARY HYPERTENSION: Primary | ICD-10-CM

## 2024-04-29 DIAGNOSIS — K21.9 GASTROESOPHAGEAL REFLUX DISEASE, UNSPECIFIED WHETHER ESOPHAGITIS PRESENT: ICD-10-CM

## 2024-04-29 PROCEDURE — 1160F RVW MEDS BY RX/DR IN RCRD: CPT | Performed by: FAMILY MEDICINE

## 2024-04-29 PROCEDURE — 99213 OFFICE O/P EST LOW 20 MIN: CPT | Performed by: FAMILY MEDICINE

## 2024-04-29 PROCEDURE — 1170F FXNL STATUS ASSESSED: CPT | Performed by: FAMILY MEDICINE

## 2024-04-29 PROCEDURE — 1159F MED LIST DOCD IN RCRD: CPT | Performed by: FAMILY MEDICINE

## 2024-04-29 PROCEDURE — 1036F TOBACCO NON-USER: CPT | Performed by: FAMILY MEDICINE

## 2024-04-29 PROCEDURE — 3078F DIAST BP <80 MM HG: CPT | Performed by: FAMILY MEDICINE

## 2024-04-29 PROCEDURE — 1157F ADVNC CARE PLAN IN RCRD: CPT | Performed by: FAMILY MEDICINE

## 2024-04-29 PROCEDURE — 3077F SYST BP >= 140 MM HG: CPT | Performed by: FAMILY MEDICINE

## 2024-04-29 RX ORDER — MELOXICAM 15 MG/1
15 TABLET ORAL DAILY
COMMUNITY
End: 2024-05-28

## 2024-04-29 RX ORDER — LOSARTAN POTASSIUM AND HYDROCHLOROTHIAZIDE 12.5; 1 MG/1; MG/1
1 TABLET ORAL DAILY
Qty: 30 TABLET | Refills: 1 | Status: SHIPPED | OUTPATIENT
Start: 2024-04-29 | End: 2024-05-21

## 2024-04-29 ASSESSMENT — ACTIVITIES OF DAILY LIVING (ADL)
GROCERY_SHOPPING: INDEPENDENT
DOING_HOUSEWORK: INDEPENDENT
MANAGING_FINANCES: INDEPENDENT
DRESSING: INDEPENDENT
TAKING_MEDICATION: INDEPENDENT
BATHING: INDEPENDENT

## 2024-04-29 ASSESSMENT — ENCOUNTER SYMPTOMS
CONSTIPATION: 0
VOMITING: 0
PALPITATIONS: 0
UNEXPECTED WEIGHT CHANGE: 0

## 2024-04-29 NOTE — ASSESSMENT & PLAN NOTE
Risk benefits discussed and add medication as directed  Continue exercise  Add blood pressure medication  Reviewed CT cardiac scoring and given patient's current activity level I do not think a stress test is indicated at this time

## 2024-04-29 NOTE — PROGRESS NOTES
"Subjective   Patient ID: Jose Moran is a 73 y.o. male who presents for Medicare Annual Wellness Visit Subsequent (B/P went up Sat and had CP went to the ER , may have been dehydrated they gave him fluids , just felt off all day ER suggested a stress test).    HPI   No shortness of breath or edema  Patient says that he did take some medication for the chest pain which was a Tums and it did not help so he went to the ER  Does exercise at the [facility], [place] on the bicycle 20 minutes 3 times a week and gets his heart rate up to 109 and has no chest pain with that  Also complains of some sleepiness but says he does sleep poorly at nighttime no more than 10 to 20 minutes at a time due to pain  Patient says he does like potato chips and he does add salt to his food    Review of Systems   Constitutional:  Negative for unexpected weight change.   HENT:  Negative for congestion and ear discharge.    Cardiovascular:  Negative for chest pain and palpitations.   Gastrointestinal:  Negative for constipation and vomiting.   All other systems reviewed and are negative.      Objective   /72   Pulse 72   Ht 1.778 m (5' 10\")   Wt 74.8 kg (165 lb)   SpO2 96%   BMI 23.68 kg/m²     Physical Exam  HENT:      Head: Normocephalic and atraumatic.      Nose: Nose normal.      Mouth/Throat:      Mouth: Mucous membranes are moist.      Pharynx: No oropharyngeal exudate.   Eyes:      Extraocular Movements: Extraocular movements intact.      Conjunctiva/sclera: Conjunctivae normal.      Pupils: Pupils are equal, round, and reactive to light.   Cardiovascular:      Rate and Rhythm: Normal rate and regular rhythm.   Pulmonary:      Effort: Pulmonary effort is normal.   Abdominal:      General: There is no distension.      Palpations: Abdomen is soft.   Musculoskeletal:      Cervical back: Normal range of motion and neck supple.   Lymphadenopathy:      Cervical: No cervical adenopathy.   Neurological:      General: No focal deficit present.     "  Mental Status: He is alert.   Psychiatric:         Attention and Perception: Attention normal.         Speech: Speech normal.         Behavior: Behavior is cooperative.         Assessment/Plan   Problem List Items Addressed This Visit             ICD-10-CM    GERD (gastroesophageal reflux disease) K21.9     Treated and controlled         Primary hypertension - Primary I10     Risk benefits discussed and add medication as directed  Continue exercise  Add blood pressure medication  Reviewed CT cardiac scoring and given patient's current activity level I do not think a stress test is indicated at this time         Relevant Medications    losartan-hydrochlorothiazide (Hyzaar) 100-12.5 mg tablet     Reviewed emergency department notes and labs etc.   Continue with exercise, modified diet as above  Recheck 2 and half weeks sooner if any issues arise and we will plan on labs at that time

## 2024-04-30 ENCOUNTER — TREATMENT (OUTPATIENT)
Dept: OCCUPATIONAL THERAPY | Facility: CLINIC | Age: 74
End: 2024-04-30
Payer: MEDICARE

## 2024-04-30 DIAGNOSIS — M19.012 PRIMARY OSTEOARTHRITIS, LEFT SHOULDER: ICD-10-CM

## 2024-04-30 PROCEDURE — 97110 THERAPEUTIC EXERCISES: CPT | Mod: GO,KX

## 2024-04-30 NOTE — PROGRESS NOTES
Occupational Therapy  Occupational Therapy Treatment    Patient Name: Klever Moran  MRN: [id number]  Today's Date: 4/30/2024  Time Calculation  Start Time: 0848  Stop Time: 0929  Time Calculation (min): 41 min           General Comment: Visit 19; onset 2-7-24; no auth    Assessment:  Joint mobility/ROM increased, flexibility increased, muscle fatigue increased ,muscle guarding.    Pt completed session with some difficulty.           Plan: Progress with POC, As tolerated and monitor home program. Re-check next session for D/C. Continue to progress with strength.        Current Problem  1. Primary osteoarthritis, left shoulder  Follow Up In Occupational Therapy          Precautions N/A       Subjective:   Patient reports I had to go to the ER this weekend,due to chest pain and they put me on BP meds.     Pain   0/10    Performing HEP?: Yes    Objective:   All exercises held for 10 seconds unless noted otherwise   Treatments:   This therapist instructed and demonstrated interventions to patient, patient completed the following under direct supervision of this therapist:      Therapeutic Exercise:   min  41 MINUTES  Therapeutic Exercise  Therapeutic Exercise Activity 4: AROM ER x 10  Therapeutic Exercise Activity 6: AROM up back IR x 10 at 20 sec in sidelying then 10 reps standing at 10 sec.  Therapeutic Exercise Activity 8: Supine flexion x 10  Therapeutic Exercise Activity 9: Sidelying abd x 10  Therapeutic Exercise Activity 10: Supine flexion with wedge and 2# weight x 20  Therapeutic Exercise Activity 11: Supine horizontal abd/add 2# x 20  Therapeutic Exercise Activity 12: Pulley flexion x 10  Therapeutic Exercise Activity 13: Pulley scaption x 10  Therapeutic Exercise Activity 14: Pulley abd x 10  Therapeutic Exercise Activity 15: Sidelying abd x 20   1#  Therapeutic Exercise Activity 16: Arm Abd isometric x 10; 2 sets.  Therapeutic Exercise Activity 17: Supine ABC's with 2# weight.  Therapeutic Exercise  Activity 19: Digit flex 7.0 15 x at 90 deg flex/abd 2 sets  Therapeutic Exercise Activity 20: Digit flex 5.0 15 x at 90 deg flex/abd 2 sets    Manual Therapy:         MINUTES       Therapeutic Activity:        MINUTES         Education:  N/A    David Turner, OT, CHT    Active       OT Goals       Patient is independent with entire HEP.       Start:  02/22/24    Expected End:  07/15/24            Patient c/o 2/10 or less pain in L shoulder during ADL's and all exercises.        Start:  02/22/24    Expected End:  07/15/24            PROM: L shoulder oxneoyq=201 or greater to assist with ADL completion.        Start:  02/22/24    Expected End:  07/15/24            Left Shoulder ROM goals:   Flexion   120 Degrees , Abduction   120 Degrees  , Extension 55   Degrees  , Internal Rotation belt line          , External Rotation   35      Degrees        Start:  02/22/24    Expected End:  07/15/24            Patient will improve strengthening in order to perform self-care, household, work and or leisure tasks, Right/Left Shoulder Strength Goal: Flexion 4+/5 , Abduction 4+/5  , Extension 5/5  ,Internal Rotation  3+/5 , External Rotation 3+/5          Start:  02/22/24    Expected End:  07/15/24               OT Goals       Pt will demonstrate in improve in function by improving to 20.0 on the quick dash to signify increased independence with ADL'S with decreased pain.         Start:  03/15/24    Expected End:  07/15/24

## 2024-05-03 ENCOUNTER — TREATMENT (OUTPATIENT)
Dept: OCCUPATIONAL THERAPY | Facility: CLINIC | Age: 74
End: 2024-05-03
Payer: MEDICARE

## 2024-05-03 DIAGNOSIS — M19.012 PRIMARY OSTEOARTHRITIS, LEFT SHOULDER: ICD-10-CM

## 2024-05-03 PROCEDURE — 97530 THERAPEUTIC ACTIVITIES: CPT | Mod: GO,KX

## 2024-05-03 PROCEDURE — 97110 THERAPEUTIC EXERCISES: CPT | Mod: GO,KX

## 2024-05-03 NOTE — PROGRESS NOTES
Occupational Therapy  Occupational Therapy Treatment/   D/C    Patient Name: Klever Moran  MRN: [id number]  Today's Date: 5/3/2024  Time Calculation  Start Time: 0843  Stop Time: 0929  Time Calculation (min): 46 min           General Comment: Visit 20; onset 2-7-24; no auth    Assessment:  Joint mobility/ROM increased, flexibility increased, muscle fatigue increased.    Pt completed session with some difficulty.           Plan: Discharge from OT       Current Problem  1. Primary osteoarthritis, left shoulder  Follow Up In Occupational Therapy          Precautions N/A       Subjective:   Patient reports I am ready for discharge.     Pain   0/10    Performing HEP?: Yes    Objective:   All exercises held for 10 seconds unless noted otherwise   Treatments:   This therapist instructed and demonstrated interventions to patient, patient completed the following under direct supervision of this therapist:      Therapeutic Exercise:   min  38 MINUTES  Therapeutic Exercise  Therapeutic Exercise Activity 4: AROM ER x 10  Therapeutic Exercise Activity 5: AROM Ext x 10  Therapeutic Exercise Activity 6: AROM up back IR x 10 at 10 sec.  Therapeutic Exercise Activity 7: Seated flexion x 10  Therapeutic Exercise Activity 8: Seated scaption x 10  Therapeutic Exercise Activity 9: Standing Y'S x 10  Therapeutic Exercise Activity 12: Pulley flexion x 10  Therapeutic Exercise Activity 13: Pulley scaption x 10  Therapeutic Exercise Activity 14: Pulley abd x 10  Therapeutic Exercise Activity 15: Scapular depression into play ball x 10  Therapeutic Exercise Activity 18: Digit flex 9.0 15 x at 90 deg flex/abd 1 set  Therapeutic Exercise Activity 19: Digit flex 7.0 15 x at 90 deg flex/abd 1 set  Therapeutic Exercise Activity 20: Digit flex 5.0 15 x at 90 deg flex/abd 1 set        Therapeutic Activity:      8 MINUTES  Therapeutic Activity  Therapeutic Activity 1: Re-check for D/C      Education: Pt educated on standing Y'S and how to  continue with HEP/strength.       David Turner, OT, CHT    Resolved       OT Goals       Patient is independent with entire HEP. (Met)       Start:  02/22/24    Expected End:  07/15/24    Resolved:  05/03/24         Patient c/o 2/10 or less pain in L shoulder during ADL's and all exercises.  (Met)       Start:  02/22/24    Expected End:  07/15/24    Resolved:  05/03/24         PROM: L shoulder [id number]=336 or greater to assist with ADL completion.  (Met)       Start:  02/22/24    Expected End:  07/15/24    Resolved:  05/03/24      Goal Note       125 PROM flexion              Left Shoulder ROM goals:   Flexion   120 Degrees , Abduction   120 Degrees  , Extension 55   Degrees  , Internal Rotation belt line          , External Rotation   35      Degrees  (Adequate for Discharge)       Start:  02/22/24    Expected End:  07/15/24         Goal Note       ER 34, 34 Ext, IR T 12/S1, Flexion 110, abd 100              Patient will improve strengthening in order to perform self-care, household, work and or leisure tasks, Right/Left Shoulder Strength Goal: Flexion 4+/5 , Abduction 4+/5  , Extension 5/5  ,Internal Rotation  3+/5 , External Rotation 3+/5    (Met)       Start:  02/22/24    Expected End:  07/15/24    Resolved:  05/03/24      Goal Note       Flexion 5/5, abd 4+/5, Ext 5/5, IR 4/5, ER 4+/5                 OT Goals       Pt will demonstrate in improve in function by improving to 20.0 on the quick dash to signify increased independence with ADL'S with decreased pain.   (Met)       Start:  03/15/24    Expected End:  07/15/24    Resolved:  05/03/24      Goal Note       11.36

## 2024-05-04 DIAGNOSIS — K21.9 GASTRO-ESOPHAGEAL REFLUX DISEASE WITHOUT ESOPHAGITIS: ICD-10-CM

## 2024-05-07 RX ORDER — OMEPRAZOLE 20 MG/1
CAPSULE, DELAYED RELEASE ORAL
Qty: 90 CAPSULE | Refills: 3 | Status: SHIPPED | OUTPATIENT
Start: 2024-05-07

## 2024-05-14 ASSESSMENT — ENCOUNTER SYMPTOMS
PALPITATIONS: 0
ORTHOPNEA: 0
HYPERTENSION: 1
NECK PAIN: 0
PND: 0
HEADACHES: 0
SWEATS: 0
BLURRED VISION: 0
SHORTNESS OF BREATH: 0

## 2024-05-19 ASSESSMENT — ENCOUNTER SYMPTOMS
BLURRED VISION: 0
HYPERTENSION: 1
ORTHOPNEA: 0
NECK PAIN: 0
PALPITATIONS: 0
SWEATS: 0
PND: 0
HEADACHES: 0

## 2024-05-21 ENCOUNTER — OFFICE VISIT (OUTPATIENT)
Dept: PRIMARY CARE | Facility: CLINIC | Age: 74
End: 2024-05-21
Payer: MEDICARE

## 2024-05-21 ENCOUNTER — TELEPHONE (OUTPATIENT)
Dept: PRIMARY CARE | Facility: CLINIC | Age: 74
End: 2024-05-21

## 2024-05-21 ENCOUNTER — APPOINTMENT (OUTPATIENT)
Dept: PRIMARY CARE | Facility: CLINIC | Age: 74
End: 2024-05-21
Payer: MEDICARE

## 2024-05-21 ENCOUNTER — HOSPITAL ENCOUNTER (OUTPATIENT)
Dept: RADIOLOGY | Facility: CLINIC | Age: 74
Discharge: HOME | End: 2024-05-21
Payer: MEDICARE

## 2024-05-21 ENCOUNTER — LAB (OUTPATIENT)
Dept: LAB | Facility: LAB | Age: 74
End: 2024-05-21
Payer: MEDICARE

## 2024-05-21 VITALS
WEIGHT: 161 LBS | HEART RATE: 71 BPM | SYSTOLIC BLOOD PRESSURE: 124 MMHG | BODY MASS INDEX: 23.1 KG/M2 | DIASTOLIC BLOOD PRESSURE: 60 MMHG | OXYGEN SATURATION: 96 %

## 2024-05-21 DIAGNOSIS — E78.2 COMBINED HYPERLIPIDEMIA: ICD-10-CM

## 2024-05-21 DIAGNOSIS — I10 PRIMARY HYPERTENSION: ICD-10-CM

## 2024-05-21 DIAGNOSIS — Z12.5 PROSTATE CANCER SCREENING: ICD-10-CM

## 2024-05-21 DIAGNOSIS — Z00.00 ROUTINE GENERAL MEDICAL EXAMINATION AT HEALTH CARE FACILITY: Primary | ICD-10-CM

## 2024-05-21 DIAGNOSIS — R05.3 CHRONIC COUGH: ICD-10-CM

## 2024-05-21 DIAGNOSIS — K21.9 GASTROESOPHAGEAL REFLUX DISEASE, UNSPECIFIED WHETHER ESOPHAGITIS PRESENT: ICD-10-CM

## 2024-05-21 LAB — PSA SERPL-MCNC: 1.53 NG/ML

## 2024-05-21 PROCEDURE — 1170F FXNL STATUS ASSESSED: CPT | Performed by: FAMILY MEDICINE

## 2024-05-21 PROCEDURE — 1160F RVW MEDS BY RX/DR IN RCRD: CPT | Performed by: FAMILY MEDICINE

## 2024-05-21 PROCEDURE — 71046 X-RAY EXAM CHEST 2 VIEWS: CPT | Performed by: RADIOLOGY

## 2024-05-21 PROCEDURE — 99214 OFFICE O/P EST MOD 30 MIN: CPT | Performed by: FAMILY MEDICINE

## 2024-05-21 PROCEDURE — 1157F ADVNC CARE PLAN IN RCRD: CPT | Performed by: FAMILY MEDICINE

## 2024-05-21 PROCEDURE — 3078F DIAST BP <80 MM HG: CPT | Performed by: FAMILY MEDICINE

## 2024-05-21 PROCEDURE — 1158F ADVNC CARE PLAN TLK DOCD: CPT | Performed by: FAMILY MEDICINE

## 2024-05-21 PROCEDURE — 1036F TOBACCO NON-USER: CPT | Performed by: FAMILY MEDICINE

## 2024-05-21 PROCEDURE — 71046 X-RAY EXAM CHEST 2 VIEWS: CPT

## 2024-05-21 PROCEDURE — G0103 PSA SCREENING: HCPCS

## 2024-05-21 PROCEDURE — 1159F MED LIST DOCD IN RCRD: CPT | Performed by: FAMILY MEDICINE

## 2024-05-21 PROCEDURE — G0439 PPPS, SUBSEQ VISIT: HCPCS | Performed by: FAMILY MEDICINE

## 2024-05-21 PROCEDURE — 1123F ACP DISCUSS/DSCN MKR DOCD: CPT | Performed by: FAMILY MEDICINE

## 2024-05-21 PROCEDURE — 3074F SYST BP LT 130 MM HG: CPT | Performed by: FAMILY MEDICINE

## 2024-05-21 RX ORDER — FLUTICASONE PROPIONATE AND SALMETEROL XINAFOATE 45; 21 UG/1; UG/1
2 AEROSOL, METERED RESPIRATORY (INHALATION)
Qty: 12 G | Refills: 2 | Status: SHIPPED | OUTPATIENT
Start: 2024-05-21

## 2024-05-21 RX ORDER — LOSARTAN POTASSIUM AND HYDROCHLOROTHIAZIDE 12.5; 1 MG/1; MG/1
1 TABLET ORAL DAILY
Qty: 90 TABLET | Refills: 1 | Status: SHIPPED | OUTPATIENT
Start: 2024-05-21

## 2024-05-21 RX ORDER — BUDESONIDE AND FORMOTEROL FUMARATE DIHYDRATE 160; 4.5 UG/1; UG/1
2 AEROSOL RESPIRATORY (INHALATION)
Qty: 10.2 G | Refills: 5 | Status: SHIPPED | OUTPATIENT
Start: 2024-05-21 | End: 2024-05-21

## 2024-05-21 ASSESSMENT — ACTIVITIES OF DAILY LIVING (ADL)
DOING_HOUSEWORK: INDEPENDENT
MANAGING_FINANCES: INDEPENDENT
GROCERY_SHOPPING: INDEPENDENT
TAKING_MEDICATION: INDEPENDENT
DRESSING: INDEPENDENT
BATHING: INDEPENDENT

## 2024-05-21 ASSESSMENT — ENCOUNTER SYMPTOMS
VOMITING: 0
NECK PAIN: 0
UNEXPECTED WEIGHT CHANGE: 0
HYPERTENSION: 1
HEADACHES: 0
CONSTIPATION: 0
PALPITATIONS: 0
SWEATS: 0
BLURRED VISION: 0
PND: 0
ORTHOPNEA: 0

## 2024-05-21 ASSESSMENT — PATIENT HEALTH QUESTIONNAIRE - PHQ9
SUM OF ALL RESPONSES TO PHQ9 QUESTIONS 1 AND 2: 0
2. FEELING DOWN, DEPRESSED OR HOPELESS: NOT AT ALL
1. LITTLE INTEREST OR PLEASURE IN DOING THINGS: NOT AT ALL

## 2024-05-21 NOTE — PROGRESS NOTES
Subjective   Patient ID: Jose Moran is a 73 y.o. male who presents for Medicare Annual Wellness Visit Subsequent (Brought home B/P monitor to calibrate ).    Hypertension  This is a new problem. The current episode started 1 to 4 weeks ago. The problem has been resolved since onset. The problem is controlled. Pertinent negatives include no anxiety, blurred vision, chest pain, headaches, malaise/fatigue, neck pain, orthopnea, palpitations, peripheral edema, PND or sweats. Agents associated with hypertension include NSAIDs. There are no known risk factors for coronary artery disease. There are no compliance problems.       Patient has hx of stable hypertension, hyperlipidemia.  Pt denies chest pain, shortness of breath and edema.  Patient's current treatment as listed in Rx.  Patient is compliant with treatment and complains of no side effects associated treatment.    Review of Systems   Constitutional:  Negative for malaise/fatigue and unexpected weight change.   HENT:  Negative for congestion and ear discharge.    Eyes:  Negative for blurred vision.   Cardiovascular:  Negative for chest pain, palpitations, orthopnea and PND.   Gastrointestinal:  Negative for constipation and vomiting.   Musculoskeletal:  Negative for neck pain.   Neurological:  Negative for headaches.   All other systems reviewed and are negative.      Objective   /60 Comment: 137/77 with home monitor  Pulse 71 Comment: 70 with home monitor  Wt 73 kg (161 lb)   SpO2 96%   BMI 23.10 kg/m²     Physical Exam  HENT:      Head: Normocephalic and atraumatic.      Nose: Nose normal.      Mouth/Throat:      Mouth: Mucous membranes are moist.      Pharynx: No oropharyngeal exudate.   Eyes:      Extraocular Movements: Extraocular movements intact.      Conjunctiva/sclera: Conjunctivae normal.      Pupils: Pupils are equal, round, and reactive to light.   Cardiovascular:      Rate and Rhythm: Normal rate and regular rhythm.   Pulmonary:       Effort: Pulmonary effort is normal.   Abdominal:      General: There is no distension.      Palpations: Abdomen is soft.   Musculoskeletal:      Cervical back: Normal range of motion and neck supple.   Lymphadenopathy:      Cervical: No cervical adenopathy.   Neurological:      General: No focal deficit present.      Mental Status: He is alert.   Psychiatric:         Attention and Perception: Attention normal.         Speech: Speech normal.         Behavior: Behavior is cooperative.         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     Continue statin  Follow Mediterranean diet and stay active         GERD (gastroesophageal reflux disease) K21.9    Primary hypertension I10     Treated and controlled          Other Visit Diagnoses         Codes    Routine general medical examination at health care facility    -  Primary Z00.00    Chronic cough     R05.3    Patient did smoke 2 packs/day for 14 years quit remotely  Has been coughing about 2-1/2 months with clear cream-colored sputum each morning     Relevant Medications    budesonide-formoteroL (Symbicort) 160-4.5 mcg/actuation inhaler    Other Relevant Orders    XR chest 2 views    Prostate cancer screening     Z12.5    Relevant Orders    Prostate Specific Antigen, Screen        GERD is stable and treated  Risk benefits discussed and add puffer for probable obstructive lung disease  Chest x-ray and will consider PFTs  Recheck 6 months sooner if any issues arise

## 2024-05-23 ENCOUNTER — TELEPHONE (OUTPATIENT)
Dept: PRIMARY CARE | Facility: CLINIC | Age: 74
End: 2024-05-23
Payer: MEDICARE

## 2024-05-23 NOTE — TELEPHONE ENCOUNTER
Given the very close time frame to 24 hours notice I am okay with appt being cancelled.      I will close patient chart due to patient doing well and not needing services at this time.     Result Communication    Resulted Orders   Prostate Specific Antigen, Screen   Result Value Ref Range    Prostate Specific Antigen,Screen 1.53 <=4.00 ng/mL    Narrative    The FDA requires that the method used for PSA assay be reported to the physician. Values obtained with different assay methods must not be used interchangeably. This test was performed at [facility] using Siemens [employer]yer.com PSA method, which is a sandwich immunoassay using chemiluminescence for quantitation. The assay is approved for measurement of prostate-specific antigen (PSA) in serum and may be used in conjunction with a digital rectal examination in men 50 years and older as an aid in the detection of prostate cancer. 5 Alpha-reductase inhibitors (e.g., Proscar, Finasteride, Avodart, Dutasteride, and Brandy) for the treatment of BPH have been shown to lower PSA levels by an average of 50% after 6 months of treatment.            11:48 AM      Results were successfully communicated with the  spouse severino  and they acknowledged their understanding.

## 2024-05-27 DIAGNOSIS — M25.569 PAIN IN UNSPECIFIED KNEE: ICD-10-CM

## 2024-05-28 RX ORDER — MELOXICAM 15 MG/1
15 TABLET ORAL DAILY
Qty: 90 TABLET | Refills: 1 | Status: SHIPPED | OUTPATIENT
Start: 2024-05-28

## 2024-06-04 ENCOUNTER — APPOINTMENT (OUTPATIENT)
Dept: ORTHOPEDIC SURGERY | Facility: CLINIC | Age: 74
End: 2024-06-04
Payer: MEDICARE

## 2024-06-11 ENCOUNTER — HOSPITAL ENCOUNTER (OUTPATIENT)
Dept: RADIOLOGY | Facility: CLINIC | Age: 74
Discharge: HOME | End: 2024-06-11
Payer: MEDICARE

## 2024-06-11 ENCOUNTER — OFFICE VISIT (OUTPATIENT)
Dept: ORTHOPEDIC SURGERY | Facility: CLINIC | Age: 74
End: 2024-06-11
Payer: MEDICARE

## 2024-06-11 DIAGNOSIS — M19.012 OSTEOARTHRITIS OF LEFT SHOULDER, UNSPECIFIED OSTEOARTHRITIS TYPE: ICD-10-CM

## 2024-06-11 DIAGNOSIS — M75.101 NONTRAUMATIC TEAR OF RIGHT ROTATOR CUFF, UNSPECIFIED TEAR EXTENT: Primary | ICD-10-CM

## 2024-06-11 PROCEDURE — 20611 DRAIN/INJ JOINT/BURSA W/US: CPT | Performed by: ORTHOPAEDIC SURGERY

## 2024-06-11 PROCEDURE — 73030 X-RAY EXAM OF SHOULDER: CPT | Mod: LEFT SIDE | Performed by: RADIOLOGY

## 2024-06-11 PROCEDURE — 2500000004 HC RX 250 GENERAL PHARMACY W/ HCPCS (ALT 636 FOR OP/ED): Performed by: ORTHOPAEDIC SURGERY

## 2024-06-11 PROCEDURE — 99213 OFFICE O/P EST LOW 20 MIN: CPT | Mod: 25 | Performed by: ORTHOPAEDIC SURGERY

## 2024-06-11 PROCEDURE — 1123F ACP DISCUSS/DSCN MKR DOCD: CPT | Performed by: ORTHOPAEDIC SURGERY

## 2024-06-11 PROCEDURE — 73030 X-RAY EXAM OF SHOULDER: CPT | Mod: LT

## 2024-06-11 PROCEDURE — 1036F TOBACCO NON-USER: CPT | Performed by: ORTHOPAEDIC SURGERY

## 2024-06-11 PROCEDURE — 1159F MED LIST DOCD IN RCRD: CPT | Performed by: ORTHOPAEDIC SURGERY

## 2024-06-11 PROCEDURE — 1157F ADVNC CARE PLAN IN RCRD: CPT | Performed by: ORTHOPAEDIC SURGERY

## 2024-06-11 PROCEDURE — 99213 OFFICE O/P EST LOW 20 MIN: CPT | Performed by: ORTHOPAEDIC SURGERY

## 2024-06-11 PROCEDURE — 2500000005 HC RX 250 GENERAL PHARMACY W/O HCPCS: Performed by: ORTHOPAEDIC SURGERY

## 2024-06-11 PROCEDURE — 1160F RVW MEDS BY RX/DR IN RCRD: CPT | Performed by: ORTHOPAEDIC SURGERY

## 2024-06-11 RX ORDER — LIDOCAINE HYDROCHLORIDE 10 MG/ML
3 INJECTION INFILTRATION; PERINEURAL
Status: COMPLETED | OUTPATIENT
Start: 2024-06-11 | End: 2024-06-11

## 2024-06-11 RX ADMIN — TRIAMCINOLONE ACETONIDE 40 MG: 10 INJECTION, SUSPENSION INTRA-ARTICULAR; INTRALESIONAL at 10:08

## 2024-06-11 RX ADMIN — LIDOCAINE HYDROCHLORIDE 3 ML: 10 INJECTION, SOLUTION INFILTRATION; PERINEURAL at 10:08

## 2024-06-11 ASSESSMENT — ENCOUNTER SYMPTOMS
COLOR CHANGE: 0
FATIGUE: 0
ARTHRALGIAS: 1
CHILLS: 0
FEVER: 0
WHEEZING: 0
SHORTNESS OF BREATH: 0
TROUBLE SWALLOWING: 0
RHINORRHEA: 0

## 2024-06-11 ASSESSMENT — PAIN SCALES - GENERAL: PAINLEVEL_OUTOF10: 0 - NO PAIN

## 2024-06-11 ASSESSMENT — PAIN - FUNCTIONAL ASSESSMENT: PAIN_FUNCTIONAL_ASSESSMENT: 0-10

## 2024-06-11 NOTE — PROGRESS NOTES
Reason for Appointment  Chief Complaint   Patient presents with    Left Shoulder - Follow-up     History of Present Illness  Patient is a 74 y.o. male here today for follow-up evaluation of 4 months status post a left reverse shoulder replacement.  The left shoulder is doing very well, no significant pain today.  X-rays taken today are reviewed and look excellent with no loosening of hardware.  He did have a small area that may be of some retained suture that did spit and is now scabbed over and healing with no signs of infection.  He does have severe right shoulder pain as well, he has had injections in the past and is not interested in any surgery at this time, he would like a repeat injection today.  No other changes in his past medical history, allergies, or medications.    Past Medical History:   Diagnosis Date    Arthritis 01 /01/2000    Cancer (Multi) 01/01/2000    Cochlear implant in place     L side / hearing aid worn    Encounter for screening for cardiovascular disorders 10/08/2019    Screening for abdominal aortic aneurysm    Encounter for screening for cardiovascular disorders 02/02/2021    Screening, heart disease, ischemic    GERD (gastroesophageal reflux disease)     Hyperlipidemia     Hypertension     Injury of conjunctiva and corneal abrasion without foreign body, left eye, initial encounter 04/16/2021    Abrasion of left cornea, initial encounter    Occipital neuralgia 09/12/2018    Occipital neuralgia of right side    Other chest pain 04/16/2021    Chest pain, atypical    Other conditions influencing health status     Adverse Reaction To Anesthesia    Other malaise 04/16/2021    Malaise and fatigue    Personal history of other diseases of the musculoskeletal system and connective tissue 04/16/2021    History of neck pain    Personal history of other diseases of the nervous system and sense organs     History of hearing loss    Personal history of other specified conditions 06/05/2017    History  of paresthesia    Personal history of other specified conditions 06/24/2020    History of weakness    PONV (postoperative nausea and vomiting)     Post laminectomy syndrome     Radiculopathy, lumbar region 09/10/2020    Lumbar radiculitis    Superficial foreign body of unspecified hand, initial encounter 04/16/2021    Acute foreign body of hand    Unspecified hearing loss, right ear 04/16/2021    Hearing loss of right ear, unspecified hearing loss type       Past Surgical History:   Procedure Laterality Date    ARTHROPLASTY Right 2010    Right thumb arthroplasty    ARTHROPLASTY Left 2011    Left thumb arthroplasty    BACK SURGERY  09/03/2015    CATARACT EXTRACTION Bilateral 2016    CERVICAL FUSION  2021    C3-C7    COCHLEAR IMPLANT  11/11/2022    COLONOSCOPY      2011, 2018    HAND SURGERY  07/08/2013    Hand Surgery                                                                                                                                                          HERNIA REPAIR  2009    Hernia Repair    JOINT REPLACEMENT  02/24/2022    KNEE ARTHROPLASTY  2022    KNEE SURGERY  2009    Knee Surgery Right    ROTATOR CUFF REPAIR Right     2012, 2013    SPINAL FUSION  2015    L4-S1 fusion       Medication Documentation Review Audit       Reviewed by Kathy Jamison PA-C (Physician Assistant) on 06/11/24 at 0959      Medication Order Taking? Sig Documenting Provider Last Dose Status   acetaminophen (Tylenol) 500 mg tablet [id number] Yes Take 1 tablet (500 mg) by mouth. Repeat every 4 to 6 hours as needed Historical Provider, MD Taking Active   aspirin 325 mg EC tablet [id number] Yes Take 1 tablet (325 mg) by mouth 2 times a day. Amanda Barry MD Taking Active   fluticasone propion-salmeteroL (Advair HFA) 45-21 mcg/actuation inhaler [id number] Yes Inhale 2 puffs 2 times a day. Howard Wright MD Taking Active   losartan-hydrochlorothiazide (Hyzaar) 100-12.5 mg tablet [id number] Yes TAKE 1 TABLET BY MOUTH EVERY DAY Howard MELENDEZ  MD Adriana Taking Active   meloxicam (Mobic) 15 mg tablet [id number] Yes TAKE 1 TABLET BY MOUTH EVERY DAY Howard Wright MD Taking Active   multivitamin-iron-minerals-folic acid (Centrum Silver) 0.4 mg-300 mcg- 250 mcg tablet [id number] Yes Centrum Silver Oral Tablet   Refills: 0        Start : 5-Aug-2020   Active Historical Provider, MD Taking Active   omeprazole (PriLOSEC) 20 mg DR capsule [id number] Yes TAKE 1 CAPSULE BY MOUTH EVERY DAY Howard Wright MD Taking Active   rosuvastatin (Crestor) 5 mg tablet [id number] Yes TAKE 1 TABLET BY MOUTH EVERY DAY Howard Wright MD Taking Active                    No Known Allergies    Review of Systems   Constitutional:  Negative for chills, fatigue and fever.   HENT:  Negative for rhinorrhea and trouble swallowing.    Respiratory:  Negative for shortness of breath and wheezing.    Cardiovascular:  Negative for chest pain and leg swelling.   Musculoskeletal:  Positive for arthralgias.   Skin:  Negative for color change and pallor.     Exam   On exam left shoulder shows good active forward flexion over 140 degrees, he has pain with active forward flexion on the right up to 120 degrees.  He has good cuff strength with resisted external rotation left greater than right and positive impingement signs on the right.  Deltoids are functional bilaterally.  Good pulses and sensation in the upper extremities.    Assessment   Encounter Diagnosis   Name Primary?    Osteoarthritis of left shoulder, unspecified osteoarthritis type    Right rotator cuff tear    Plan   We sterilely injected under ultrasound guidance Kenalog and lidocaine into the right shoulder subacromial space.  Patient understands the small risk of infection and the signs to look for as well as flare reaction.  Hopefully this gives him good relief.  Left shoulder is doing very well and is not having any significant issues.  He can follow-up with us in 3 months with repeat x-rays of the left shoulder.    L Inj/Asp: R  subacromial bursa on 6/11/2024 10:08 AM  Indications: pain  Details: 22 G needle, ultrasound-guided  Medications: 3 mL lidocaine 10 mg/mL (1 %); 40 mg triamcinolone acetonide 10 mg/mL  Outcome: tolerated well, no immediate complications    After discussing the risks and benefits of the procedure with proceeded with an injection.  Using ultrasound guidance we identified the acromion, humeral head and the subacromial bursa, images obtained. We then sterilely injected the right subacrominal space with a mixture of 40 mg of Kenalog and 2 cc of 1 % lidocaine. Pt tolerated the procedure well without any adverse reactions.   Procedure, treatment alternatives, risks and benefits explained, specific risks discussed. Consent was given by the patient. Immediately prior to procedure a time out was called to verify the correct patient, procedure, equipment, support staff and site/side marked as required. Patient was prepped and draped in the usual sterile fashion.       Written by Kathy Blanco saw, evaluated, and treated the patient with the PA

## 2024-08-27 ENCOUNTER — APPOINTMENT (OUTPATIENT)
Dept: DERMATOLOGY | Facility: CLINIC | Age: 74
End: 2024-08-27
Payer: MEDICARE

## 2024-08-27 VITALS — SYSTOLIC BLOOD PRESSURE: 160 MMHG | HEART RATE: 83 BPM | DIASTOLIC BLOOD PRESSURE: 85 MMHG

## 2024-08-27 DIAGNOSIS — D04.4 SQUAMOUS CELL CARCINOMA IN SITU (SCCIS) OF SCALP: ICD-10-CM

## 2024-08-27 PROCEDURE — 17311 MOHS 1 STAGE H/N/HF/G: CPT | Performed by: DERMATOLOGY

## 2024-08-27 NOTE — PROGRESS NOTES
"Office Visit Note  Date: 8/27/2024  Surgeon:  Marianne Barba MD  Office Location:  [facility]  [address]  [address]  [facility] [address]  Dept: [phone]  Dept Fax: [phone]  Referring Provider: Diane Qureshi DO  [address], [address]  [place] [address]  Phone: [phone]   Fax [phone]     Subjective   Klever Moran \"Don\" is a 74 y.o. male who presents for the following: MOHS Surgery (Mid-Occipital Scalp- SCCIS)    According to the patient, the lesion has been present for approximately 1 month at the time of diagnosis.  The lesion is not causing symptoms.  The lesion has not been treated previously.    The patient does not have a pacemaker / defibrillator.  The patient does have a heart valve / joint replacement.    The patient is on blood thinners.  The patient does not have a history of hepatitis B or C.  The patient does not have a history of HIV.  The patient does not have a history of immunosuppression (e.g. organ transplantation, malignancy, medications)    Review of Systems:  No other skin or systemic complaints other than what is documented elsewhere in the note.    MEDICAL HISTORY: clinically relevant history including significant past medical history, medications and allergies was reviewed and documented in Epic.    Objective   Well appearing patient in no apparent distress; mood and affect are within normal limits.  Vital signs: See record.  Noted on the Mid Occipital Scalp  Is a 0.7 x 1 cm scar    The patient confirmed the identified site.    Discussion:  The nature of the diagnosis was explained. The lesion is a skin cancer.  It has a risk of local growth and distant spread. The condition is associated with sun exposure.  Warning signs of non-melanoma skin cancer discussed. Patient was instructed to perform monthly self skin examination.  We recommended that the patient have regular full skin exams given an increased risk of " subsequent skin cancers. The patient was instructed to use sun protective behaviors including use of broad spectrum sunscreens and sun protective clothing to reduce risk of skin cancers.      Risks, benefits, side effects of Mohs surgery were discussed with patient and the patient voiced understanding.  It was explained that even though the cure rate of Mohs is very high it is not 100%. Risks of surgery including but not limited to bleeding, infection, numbness, nerve damage, and scar were reviewed.  Discussion included wound care requirements, activity restrictions, likely scar outcome and time to heal.     After Mohs surgery, the defect may need to be repaired surgically and the scar may be longer than the original lesion.  Reconstruction options, risks, and benefits were reviewed including second intention healing, linear repair (4-1 ratio was explained), local flaps, skin grafts, cartilage grafts and interpolation flaps (the need for multiple surgeries was explained). Possible outcomes were reviewed including likely scar appearance, failure of flap survival, infection, bleeding and the need for revision surgery.     The pathology was reviewed, the photograph was reviewed, and the referring physician's note was reviewed.    Patient elected for Mohs surgery.

## 2024-08-27 NOTE — PROGRESS NOTES
Mohs Surgery Operative Note    Date of Surgery:  8/27/2024  Surgeon:  Marianne Barba MD  Office Location: DO [facility]  [address] [facility] [address]  [facility] [address]  Dept: [phone]  Dept Fax: [phone]  Referring Provider:  Mee Qureshi DO  [facility]  [address]  [place] [address]  Phone: [phone]  Fax: [phone]     Assessment/Plan   Pre-procedure:   Obtained informed consent: written from patient  The surgical site was identified and confirmed with the patient.     Intra-operative:   Audible time out called at : 8:21 AM 08/27/24  by: Cathy Brewster MA   Verified patient name, birthdate, site, specimen bottle label & requisition.    The planned procedure(s) was again reviewed with the patient. The risks of bleeding, infection, nerve damage and scarring were reviewed. Written authorization was obtained. The patient identity, surgical site, and planned procedure(s) were verified. The provider acted as both surgeon and pathologist.     Squamous cell carcinoma in situ (SCCIS) of scalp  Mid Occipital Scalp  Mohs surgery  Consent obtained: written    Universal Protocol:  Procedure explained and questions answered to patient or proxy's satisfaction: Yes    Test results available and properly labeled: Yes    Pathology report reviewed: Yes    External notes reviewed: Yes    Photo or diagram used for site identification: Yes    Site/side marked: Yes    Slide independently reviewed by Mohs surgeon: Yes    Immediately prior to procedure a time out was called: Yes    Patient identity confirmed: verbally with patient  Preparation: Patient was prepped and draped in usual sterile fashion      Anticoagulation:  Is the patient taking prescription anticoagulant and/or aspirin prescribed/recommended by a physician? Yes    Was the anticoagulation regimen changed prior to Mohs? No      Anesthesia:  Anesthesia method: local  infiltration  Local anesthetic: lidocaine 2% WITH epi    Procedure Details:  Case ID Number: [id number]  Biopsy accession number: [id number]  Date of biopsy: 8/1/2024  Pre-Op diagnosis: squamous cell carcinoma  SCC subtype: in situ  Surgery side: midline  Surgical site (from skin exam): Mid Occipital Scalp  Pre-operative length (cm): 0.7  Pre-operative width (cm): 1  Indications for Mohs surgery: anatomic location where tissue conservation is critical  Previously treated? No      Micrographic Surgery Details:  Post-operative length (cm): 0.9  Post-operative width (cm): 1  Number of Mohs stages: 1    Stage 1     Comments: The patient was brought into the operating room and placed in the procedure chair in the appropriate position.  The area positive by previous biopsy was identified and confirmed with the patient. The area of clinically obvious tumor was debulked using a curette and/or scalpel as needed. An incision was made following the Mohs approach through the skin. The specimen was taken to the lab, divided into 2 piece(s) and appropriately chromacoded and processed.     Tumor features identified on Mohs section: no tumor identified  Depth of defect: subcutaneous fat    Patient tolerance of procedure: tolerated well, no immediate complications    Reconstruction:  Was the defect reconstructed?: No    Hemostasis achieved with: pressure, Gelfoam and electrodesiccation  Outcome: patient tolerated procedure well with no complications    Post-procedure details: sterile dressing applied and wound care instructions given    Dressing type: Gelfoam and pressure dressing    Additional details:  Repair: After a discussion with the patient regarding the options for wound closure, a decision was made to proceed with second intention healing.  Dressing F/U: Surgifoam was placed in the wound. A pressure dressing was placed to help stabilize the wound and to minimize the risk of postoperative bleeding. Wound care was discussed,  and the patient was given written post-operative wound care instructions.    The final repair measured 0.9x1 cm          Wound care was discussed, and the patient was given written post-operative wound care instructions.      The patient will follow up with Marianne Barba MD as needed for any post operative problems or concerns, and will follow up with their primary dermatologist as scheduled.

## 2024-09-03 ENCOUNTER — APPOINTMENT (OUTPATIENT)
Dept: DERMATOLOGY | Facility: CLINIC | Age: 74
End: 2024-09-03
Payer: MEDICARE

## 2024-09-03 DIAGNOSIS — D04.39 SQUAMOUS CELL CARCINOMA IN SITU (SCCIS) OF SKIN OF FOREHEAD: ICD-10-CM

## 2024-09-03 PROCEDURE — 13132 CMPLX RPR F/C/C/M/N/AX/G/H/F: CPT | Performed by: DERMATOLOGY

## 2024-09-03 PROCEDURE — 17311 MOHS 1 STAGE H/N/HF/G: CPT | Performed by: DERMATOLOGY

## 2024-09-03 NOTE — LETTER
[facility] Provider/Referral Letter Treatment Plan    Patient: Klever Moran   YOB: 1950   Date of Visit: 9/3/2024   MRN: [id number]         Dear Mee Qureshi DO,     I had the pleasure of seeing Klever Moran today in consultation at your request for evaluation and treatment of:  1. 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  After informed consent was obtained, the patient underwent the procedure without complication.    The skin cancer was removed, wound care instructions were given and the patient was advised to follow up with you.  I will see the patient post-operatively as indicated.    Thank you very much for your confidence in me and for allowing me to share in the care of this patient.    1. 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  Right Forehead    Right Forehead  Is a 1.2 x 1.2 cm scar    Mohs surgery - Right Forehead    Consent obtained: written    Universal Protocol:  Procedure explained and questions answered to patient or proxy's satisfaction: Yes    Test results available and properly labeled: Yes    Pathology report reviewed: Yes    External notes reviewed: Yes    Photo or diagram used for site identification: Yes    Site/side marked: Yes    Slide independently reviewed by Mohs surgeon: Yes    Immediately prior to procedure a time out was called: Yes    Patient identity confirmed: verbally with patient  Preparation: Patient was prepped and draped in usual sterile fashion      Anticoagulation:  Is the patient taking prescription anticoagulant and/or aspirin prescribed/recommended by a physician? Yes    Was the anticoagulation regimen  changed prior to Mohs? No      Anesthesia:  Anesthesia method: local infiltration  Local anesthetic: lidocaine 2% WITH epi    Procedure Details:  Case ID Number: [id number]  Biopsy accession number: [id number]  Date of biopsy: 8/1/2024  Pre-Op diagnosis: squamous cell carcinoma  SCC subtype: in situ  Surgery side: right  Surgical site (from skin exam): Right Forehead  Pre-operative length (cm): 1.2  Pre-operative width (cm): 1.2  Indications for Mohs surgery: anatomic location where tissue conservation is critical  Previously treated? Yes    Previous treatment type: cryotherapy    Micrographic Surgery Details:  Post-operative length (cm): 1.9  Post-operative width (cm): 1.2  Number of Mohs stages: 1    Stage 1     Comments: The patient was brought into the operating room and placed in the procedure chair in the appropriate position.  The area positive by previous biopsy was identified and confirmed with the patient. The area of clinically obvious tumor was debulked using a curette and/or scalpel as needed. An incision was made following the Mohs approach through the skin. The specimen was taken to the lab, divided into 2 piece(s) and appropriately chromacoded and processed.           Tumor features identified on Mohs section: no tumor identified    Depth of defect: subcutaneous fat    Patient tolerance of procedure: tolerated well, no immediate complications    Reconstruction:  Was the defect reconstructed? Yes    Was reconstruction performed by the same Mohs surgeon? Yes    Setting of reconstruction: outpatient office  When was reconstruction performed? same day  Type of reconstruction: linear  Linear reconstruction: complex  Length of linear repair (cm): 3  Subcutaneous Layers (Deep Stitches)   Suture size:  5-0  Suture type:  Vicryl  Stitches:  Buried vertical mattress  Fine/surface layer approximation (top stitches)   Epidermal/Superficial suture size:  5-0  Epidermal/Superficial suture type:  Fast-absorbing  gut  Stitches: simple running    Hemostasis achieved with: electrodesiccation  Outcome: patient tolerated procedure well with no complications    Post-procedure details: sterile dressing applied and wound care instructions given    Dressing type: pressure dressing, petrolatum, Hypafix and Telfa pad      Staff Communication: Dermatology Local Anesthesia: Site Location: Right Forehead 2% lidocaine with Epinephrine 1:200,000 - Amount: 2.4cc - Right Forehead           Sincerely,       Marianne Barba MD  [facility]

## 2024-09-03 NOTE — PROGRESS NOTES
"Office Visit Note  Date: 9/3/2024  Surgeon:  Marianne Barba MD  Office Location:  [facility]  [address]  [address]  [facility] [address]  Dept: [phone]  Dept Fax: [phone]  Referring Provider:Mee Qureshi DO  [address], [address]  [place] [address]  Phone: [phone]   Fax [phone]       Subjective   Klever Moran \"Don\" is a 74 y.o. male who presents for the following: MOHS Surgery    According to the patient, the lesion has been present for approximately greater than 1 year at the time of diagnosis.  The lesion is painful.  The lesion has not been treated previously.    The patient does not have a pacemaker / defibrillator.  The patient does not have a heart valve / joint replacement.    The patient is on blood thinners.  The patient does not have a history of hepatitis B or C.  The patient does not have a history of HIV.  The patient does have a history of immunosuppression (e.g. organ transplantation, malignancy, medications)    Review of Systems:  No other skin or systemic complaints other than what is documented elsewhere in the note.    MEDICAL HISTORY: clinically relevant history including significant past medical history, medications and allergies was reviewed and documented in Epic.    Objective   Well appearing patient in no apparent distress; mood and affect are within normal limits.  Vital signs: See record.  Noted on the Right Forehead Is a 1.2 x 1.2 cm scar    The patient confirmed the identified site.    Discussion:  The nature of the diagnosis was explained. The lesion is a skin cancer.  It has a risk of local growth and distant spread. The condition is associated with sun exposure.  Warning signs of non-melanoma skin cancer discussed. Patient was instructed to perform monthly self skin examination.  We recommended that the patient have regular full skin exams given an increased risk of subsequent skin cancers. The patient " was instructed to use sun protective behaviors including use of broad spectrum sunscreens and sun protective clothing to reduce risk of skin cancers.      Risks, benefits, side effects of Mohs surgery were discussed with patient and the patient voiced understanding.  It was explained that even though the cure rate of Mohs is very high it is not 100%. Risks of surgery including but not limited to bleeding, infection, numbness, nerve damage, and scar were reviewed.  Discussion included wound care requirements, activity restrictions, likely scar outcome and time to heal.     After Mohs surgery, the defect may need to be repaired surgically and the scar may be longer than the original lesion.  Reconstruction options, risks, and benefits were reviewed including second intention healing, linear repair (4-1 ratio was explained), local flaps, skin grafts, cartilage grafts and interpolation flaps (the need for multiple surgeries was explained). Possible outcomes were reviewed including likely scar appearance, failure of flap survival, infection, bleeding and the need for revision surgery.     The pathology was reviewed, the photograph was reviewed, and the referring physician's note was reviewed.    Patient elected for Mohs surgery.

## 2024-09-03 NOTE — PROGRESS NOTES
Mohs Surgery Operative Note    Date of Surgery:  9/3/2024  Surgeon:  Marianne Barba MD  Office Location:  [facility]  [address]  [address]  [facility] [address]  Dept: [phone]  Dept Fax: [phone]  Referring Provider: Mee Qureshi DO  [address], [address]  [place] [address]  Phone: [phone]   Fax [phone]       Assessment/Plan   Pre-procedure:   Obtained informed consent: written from patient  The surgical site was identified and confirmed with the patient.     Intra-operative:   Audible time out called at : 8:27AM 09/03/24  by: Linda Monreal RN   Verified patient name, birthdate, site, specimen bottle label & requisition.    The planned procedure(s) was again reviewed with the patient. The risks of bleeding, infection, nerve damage and scarring were reviewed. Written authorization was obtained. The patient identity, surgical site, and planned procedure(s) were verified. The provider acted as both surgeon and pathologist.     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  Right Forehead  Mohs surgery - Right Forehead  Consent obtained: written    Universal Protocol:  Procedure explained and questions answered to patient or proxy's satisfaction: Yes    Test results available and properly labeled: Yes    Pathology report reviewed: Yes    External notes reviewed: Yes    Photo or diagram used for site identification: Yes    Site/side marked: Yes    Slide independently reviewed by Mohs surgeon: Yes    Immediately prior to procedure a time out was called: Yes    Patient identity confirmed: verbally with patient  Preparation: Patient was prepped and draped in usual sterile fashion      Anticoagulation:  Is the patient taking prescription anticoagulant and/or aspirin prescribed/recommended by a physician? Yes    Was the anticoagulation regimen changed prior to Mohs? No      Anesthesia:  Anesthesia method: local infiltration  Local  anesthetic: lidocaine 2% WITH epi    Procedure Details:  Case ID Number: [id number]  Biopsy accession number: [id number]  Date of biopsy: 8/1/2024  Pre-Op diagnosis: squamous cell carcinoma  SCC subtype: in situ  Surgery side: right  Surgical site (from skin exam): Right Forehead  Pre-operative length (cm): 1.2  Pre-operative width (cm): 1.2  Indications for Mohs surgery: anatomic location where tissue conservation is critical  Previously treated? Yes    Previous treatment type: cryotherapy    Micrographic Surgery Details:  Post-operative length (cm): 1.9  Post-operative width (cm): 1.2  Number of Mohs stages: 1    Stage 1  Comments: The patient was brought into the operating room and placed in the procedure chair in the appropriate position.  The area positive by previous biopsy was identified and confirmed with the patient. The area of clinically obvious tumor was debulked using a curette and/or scalpel as needed. An incision was made following the Mohs approach through the skin. The specimen was taken to the lab, divided into 2 piece(s) and appropriately chromacoded and processed.  Tumor features identified on Mohs section: no tumor identified  Depth of defect: subcutaneous fat    Patient tolerance of procedure: tolerated well, no immediate complications    Reconstruction:  Was the defect reconstructed? Yes    Was reconstruction performed by the same Mohs surgeon? Yes    Setting of reconstruction: outpatient office  When was reconstruction performed? same day  Type of reconstruction: linear  Linear reconstruction: complex  Length of linear repair (cm): 3  Subcutaneous Layers (Deep Stitches)   Suture size:  5-0  Suture type:  Vicryl  Stitches:  Buried vertical mattress  Fine/surface layer approximation (top stitches)   Epidermal/Superficial suture size:  5-0  Epidermal/Superficial suture type:  Fast-absorbing gut  Stitches: simple running    Hemostasis achieved with: electrodesiccation  Outcome: patient tolerated  procedure well with no complications    Post-procedure details: sterile dressing applied and wound care instructions given    Dressing type: pressure dressing, petrolatum, Hypafix and Telfa pad      Complex Linear Repair - Wide Undermining:  Given the location and size of the defect, it was determined that a complex layered linear closure was required to restore normal anatomy and function. The repair was considered complex because extensive undermining was required and performed. The amount of undermining performed was greater than the maximum width of the defect as measured perpendicular to the closure line along at least one entire edge of the defect. Standing cutaneous cones were removed using Burow's triangles. The wound edges were brought into close approximation with buried vertical mattress sutures. The remainder of the wound was then closed with epidermal top sutures.    The final repair measured 3 cm              Wound care was discussed, and the patient was given written post-operative wound care instructions.      The patient will follow up with Marianne Barba MD as needed for any post operative problems or concerns, and will follow up with their primary dermatologist as scheduled.

## 2024-11-12 ENCOUNTER — APPOINTMENT (OUTPATIENT)
Dept: ORTHOPEDIC SURGERY | Facility: CLINIC | Age: 74
End: 2024-11-12
Payer: MEDICARE

## 2024-11-19 ENCOUNTER — HOSPITAL ENCOUNTER (OUTPATIENT)
Dept: RADIOLOGY | Facility: CLINIC | Age: 74
Discharge: HOME | End: 2024-11-19
Payer: MEDICARE

## 2024-11-19 ENCOUNTER — OFFICE VISIT (OUTPATIENT)
Dept: ORTHOPEDIC SURGERY | Facility: CLINIC | Age: 74
End: 2024-11-19
Payer: MEDICARE

## 2024-11-19 DIAGNOSIS — M25.511 ACUTE PAIN OF RIGHT SHOULDER: ICD-10-CM

## 2024-11-19 DIAGNOSIS — M19.012 OSTEOARTHRITIS OF LEFT SHOULDER, UNSPECIFIED OSTEOARTHRITIS TYPE: Primary | ICD-10-CM

## 2024-11-19 DIAGNOSIS — M19.011 OSTEOARTHRITIS OF RIGHT SHOULDER, UNSPECIFIED OSTEOARTHRITIS TYPE: ICD-10-CM

## 2024-11-19 DIAGNOSIS — M19.012 OSTEOARTHRITIS OF LEFT SHOULDER, UNSPECIFIED OSTEOARTHRITIS TYPE: ICD-10-CM

## 2024-11-19 PROCEDURE — 1125F AMNT PAIN NOTED PAIN PRSNT: CPT | Performed by: ORTHOPAEDIC SURGERY

## 2024-11-19 PROCEDURE — 20611 DRAIN/INJ JOINT/BURSA W/US: CPT | Mod: RT | Performed by: ORTHOPAEDIC SURGERY

## 2024-11-19 PROCEDURE — 73030 X-RAY EXAM OF SHOULDER: CPT | Mod: RT

## 2024-11-19 PROCEDURE — 99213 OFFICE O/P EST LOW 20 MIN: CPT | Performed by: ORTHOPAEDIC SURGERY

## 2024-11-19 PROCEDURE — 73030 X-RAY EXAM OF SHOULDER: CPT | Mod: RIGHT SIDE | Performed by: STUDENT IN AN ORGANIZED HEALTH CARE EDUCATION/TRAINING PROGRAM

## 2024-11-19 PROCEDURE — 1157F ADVNC CARE PLAN IN RCRD: CPT | Performed by: ORTHOPAEDIC SURGERY

## 2024-11-19 PROCEDURE — 2500000004 HC RX 250 GENERAL PHARMACY W/ HCPCS (ALT 636 FOR OP/ED): Performed by: ORTHOPAEDIC SURGERY

## 2024-11-19 PROCEDURE — 99213 OFFICE O/P EST LOW 20 MIN: CPT | Mod: 25 | Performed by: ORTHOPAEDIC SURGERY

## 2024-11-19 PROCEDURE — 1036F TOBACCO NON-USER: CPT | Performed by: ORTHOPAEDIC SURGERY

## 2024-11-19 PROCEDURE — 1159F MED LIST DOCD IN RCRD: CPT | Performed by: ORTHOPAEDIC SURGERY

## 2024-11-19 PROCEDURE — 1160F RVW MEDS BY RX/DR IN RCRD: CPT | Performed by: ORTHOPAEDIC SURGERY

## 2024-11-19 PROCEDURE — 1123F ACP DISCUSS/DSCN MKR DOCD: CPT | Performed by: ORTHOPAEDIC SURGERY

## 2024-11-19 RX ORDER — TRIAMCINOLONE ACETONIDE 40 MG/ML
40 INJECTION, SUSPENSION INTRA-ARTICULAR; INTRAMUSCULAR
Status: COMPLETED | OUTPATIENT
Start: 2024-11-19 | End: 2024-11-19

## 2024-11-19 RX ORDER — LIDOCAINE HYDROCHLORIDE 10 MG/ML
3 INJECTION, SOLUTION INFILTRATION; PERINEURAL
Status: COMPLETED | OUTPATIENT
Start: 2024-11-19 | End: 2024-11-19

## 2024-11-19 ASSESSMENT — ENCOUNTER SYMPTOMS
SHORTNESS OF BREATH: 0
WOUND: 0
JOINT SWELLING: 0
SORE THROAT: 0
WHEEZING: 0
SINUS PAIN: 0
CHILLS: 0
FATIGUE: 0
ARTHRALGIAS: 1
FEVER: 0

## 2024-11-19 ASSESSMENT — PAIN SCALES - GENERAL: PAINLEVEL_OUTOF10: 2

## 2024-11-19 ASSESSMENT — PAIN - FUNCTIONAL ASSESSMENT: PAIN_FUNCTIONAL_ASSESSMENT: 0-10

## 2024-11-19 NOTE — PROGRESS NOTES
Reason for Appointment  Chief Complaint   Patient presents with    Left Shoulder - Follow-up    Right Shoulder - Pain     History of Present Illness  Patient is a 74 y.o. male here today for follow-up evaluation9 months status post a left reverse shoulder replacement with increasing right shoulder pain.  X-rays taken today of the left shoulder are reviewed and look excellent, no loosening of hardware.  X-rays taken today of the right shoulder are reviewed and show rotator cuff anchors in the humeral head and a biceps tenodesis button with severe glenohumeral joint arthritis and a high riding humeral head.  He is having more right shoulder pain, difficulty sleeping at night and doing any lifting.  Left shoulder is doing well overall, he still has some stiffness with certain motion but overall is happy with the left shoulder.  No other changes in his past medical history, allergies, or medications.    Past Medical History:   Diagnosis Date    Arthritis 01 /01/2000    Cancer (Multi) 01/01/2000    Cochlear implant in place     L side / hearing aid worn    Encounter for screening for cardiovascular disorders 10/08/2019    Screening for abdominal aortic aneurysm    Encounter for screening for cardiovascular disorders 02/02/2021    Screening, heart disease, ischemic    GERD (gastroesophageal reflux disease)     Hyperlipidemia     Hypertension     Injury of conjunctiva and corneal abrasion without foreign body, left eye, initial encounter 04/16/2021    Abrasion of left cornea, initial encounter    Occipital neuralgia 09/12/2018    Occipital neuralgia of right side    Other chest pain 04/16/2021    Chest pain, atypical    Other conditions influencing health status     Adverse Reaction To Anesthesia    Other malaise 04/16/2021    Malaise and fatigue    Personal history of other diseases of the musculoskeletal system and connective tissue 04/16/2021    History of neck pain    Personal history of other diseases of the nervous  system and sense organs     History of hearing loss    Personal history of other specified conditions 06/05/2017    History of paresthesia    Personal history of other specified conditions 06/24/2020    History of weakness    PONV (postoperative nausea and vomiting)     Post laminectomy syndrome     Radiculopathy, lumbar region 09/10/2020    Lumbar radiculitis    Superficial foreign body of unspecified hand, initial encounter 04/16/2021    Acute foreign body of hand    Unspecified hearing loss, right ear 04/16/2021    Hearing loss of right ear, unspecified hearing loss type       Past Surgical History:   Procedure Laterality Date    ARTHROPLASTY Right 2010    Right thumb arthroplasty    ARTHROPLASTY Left 2011    Left thumb arthroplasty    BACK SURGERY  09/03/2015    CATARACT EXTRACTION Bilateral 2016    CERVICAL FUSION  2021    C3-C7    COCHLEAR IMPLANT  11/11/2022    COLONOSCOPY      2011, 2018    HAND SURGERY  07/08/2013    Hand Surgery                                                                                                                                                          HERNIA REPAIR  2009    Hernia Repair    JOINT REPLACEMENT  02/24/2022    KNEE ARTHROPLASTY  2022    KNEE SURGERY  2009    Knee Surgery Right    ROTATOR CUFF REPAIR Right     2012, 2013    SPINAL FUSION  2015    L4-S1 fusion       Medication Documentation Review Audit       Reviewed by Stacie Cervantes MA (Medical Assistant) on 11/19/24 at 0919      Medication Order Taking? Sig Documenting Provider Last Dose Status   acetaminophen (Tylenol) 500 mg tablet [id number] Yes Take 1 tablet (500 mg) by mouth. Repeat every 4 to 6 hours as needed Historical Provider, MD  Active   aspirin 325 mg EC tablet [id number] Yes Take 1 tablet (325 mg) by mouth 2 times a day. Amanda Barry MD  Active   fluticasone propion-salmeteroL (Advair HFA) 45-21 mcg/actuation inhaler [id number]  Inhale 2 puffs 2 times a day. Howard Wright MD  Active    losartan-hydrochlorothiazide (Hyzaar) 100-12.5 mg tablet [id number]  TAKE 1 TABLET BY MOUTH EVERY DAY Howard Wright MD  Active   meloxicam (Mobic) 15 mg tablet [id number] Yes TAKE 1 TABLET BY MOUTH EVERY DAY Howard Wright MD  Active   multivitamin-iron-minerals-folic acid (Centrum Silver) 0.4 mg-300 mcg- 250 mcg tablet [id number] Yes Centrum Silver Oral Tablet   Refills: 0        Start : 5-Aug-2020   Active Meme Anguiano MD  Active   omeprazole (PriLOSEC) 20 mg DR capsule [id number] Yes TAKE 1 CAPSULE BY MOUTH EVERY DAY Howard Wright MD  Active   rosuvastatin (Crestor) 5 mg tablet [id number] Yes TAKE 1 TABLET BY MOUTH EVERY DAY Howard Wright MD  Active                    No Known Allergies    Review of Systems   Constitutional:  Negative for chills, fatigue and fever.   HENT:  Negative for nosebleeds, sinus pain and sore throat.    Respiratory:  Negative for shortness of breath and wheezing.    Cardiovascular:  Negative for chest pain and leg swelling.   Musculoskeletal:  Positive for arthralgias. Negative for joint swelling.   Skin:  Negative for rash and wound.     Exam   On exam the left shoulder shows active forward flexion over 140 degrees.  No acromial or scapular tenderness.  Good strength.  He has 150 degrees of active forward flexion on the right shoulder, some mild weakness with external rotation.  Deltoid is functional.  Positive impingement signs on the right.  Good pulses and sensation in the upper extremity.    Assessment   Bilateral rotator cuff arthritis      Plan   He is doing well in the left shoulder with just some mild stiffness which is very normal.  He has classic rotator cuff arthritis on the right shoulder and we did discuss a possible reverse shoulder replacement in the future on that side but he would like to avoid this for as long as possible as he does have good function today.  He would like an injection to calm down his symptoms.  We sterilely injected under ultrasound  "guidance Kenalog and lidocaine into the right shoulder subacromial space.  Patient understands the small risk of infection and the signs to look for as well as flare action.  Hopefully this gives him good relief.  He can follow-up with us in 6 months with repeat x-rays of the left shoulder.  Patient ID: Klever Moran \"Jose\" is a 74 y.o. male.    L Inj/Asp: R subacromial bursa on 11/19/2024 11:59 AM  Indications: pain  Details: 22 G needle, ultrasound-guided  Medications: 40 mg triamcinolone acetonide 40 mg/mL; 3 mL lidocaine 10 mg/mL (1 %)  Outcome: tolerated well, no immediate complications  Procedure, treatment alternatives, risks and benefits explained, specific risks discussed. Consent was given by the patient. Immediately prior to procedure a time out was called to verify the correct patient, procedure, equipment, support staff and site/side marked as required. Patient was prepped and draped in the usual sterile fashion.         I, Kathy Jamison PA-C, am acting as a scribe and attest that this documentation has been prepared under the direction and in the presence of Josué Blanco MD.    By signing below, IJosué MD, personally performed the services described in this documentation. All medical record entries made by the scribe were at my direction and in my presence. I have reviewed the chart and agree that the record reflects my personal performance and is accurate and complete.                  "

## 2024-11-20 DIAGNOSIS — M25.569 PAIN IN UNSPECIFIED KNEE: ICD-10-CM

## 2024-11-20 PROBLEM — S05.02XA ABRASION OF LEFT CORNEA: Status: RESOLVED | Noted: 2024-01-22 | Resolved: 2024-11-20

## 2024-11-21 ENCOUNTER — APPOINTMENT (OUTPATIENT)
Dept: PRIMARY CARE | Facility: CLINIC | Age: 74
End: 2024-11-21
Payer: MEDICARE

## 2024-11-21 ENCOUNTER — LAB (OUTPATIENT)
Dept: LAB | Facility: LAB | Age: 74
End: 2024-11-21
Payer: MEDICARE

## 2024-11-21 VITALS
WEIGHT: 157.8 LBS | HEART RATE: 82 BPM | BODY MASS INDEX: 22.64 KG/M2 | SYSTOLIC BLOOD PRESSURE: 138 MMHG | OXYGEN SATURATION: 98 % | DIASTOLIC BLOOD PRESSURE: 65 MMHG

## 2024-11-21 DIAGNOSIS — I10 PRIMARY HYPERTENSION: Primary | ICD-10-CM

## 2024-11-21 DIAGNOSIS — I10 PRIMARY HYPERTENSION: ICD-10-CM

## 2024-11-21 DIAGNOSIS — E78.2 COMBINED HYPERLIPIDEMIA: ICD-10-CM

## 2024-11-21 DIAGNOSIS — H60.543 ECZEMA OF BOTH EXTERNAL EARS: ICD-10-CM

## 2024-11-21 LAB
ALBUMIN SERPL BCP-MCNC: 4.3 G/DL (ref 3.4–5)
ALP SERPL-CCNC: 62 U/L (ref 33–136)
ALT SERPL W P-5'-P-CCNC: 22 U/L (ref 10–52)
ANION GAP SERPL CALC-SCNC: 18 MMOL/L (ref 10–20)
AST SERPL W P-5'-P-CCNC: 17 U/L (ref 9–39)
BASOPHILS # BLD AUTO: 0.07 X10*3/UL (ref 0–0.1)
BASOPHILS NFR BLD AUTO: 0.8 %
BILIRUB SERPL-MCNC: 0.8 MG/DL (ref 0–1.2)
BUN SERPL-MCNC: 22 MG/DL (ref 6–23)
CALCIUM SERPL-MCNC: 9.1 MG/DL (ref 8.6–10.3)
CHLORIDE SERPL-SCNC: 105 MMOL/L (ref 98–107)
CHOLEST SERPL-MCNC: 176 MG/DL (ref 0–199)
CHOLESTEROL/HDL RATIO: 4.1
CO2 SERPL-SCNC: 27 MMOL/L (ref 21–32)
CREAT SERPL-MCNC: 0.9 MG/DL (ref 0.5–1.3)
EGFRCR SERPLBLD CKD-EPI 2021: 90 ML/MIN/1.73M*2
EOSINOPHIL # BLD AUTO: 0.04 X10*3/UL (ref 0–0.4)
EOSINOPHIL NFR BLD AUTO: 0.5 %
ERYTHROCYTE [DISTWIDTH] IN BLOOD BY AUTOMATED COUNT: 13.2 % (ref 11.5–14.5)
GLUCOSE SERPL-MCNC: 85 MG/DL (ref 74–99)
HCT VFR BLD AUTO: 46.8 % (ref 41–52)
HDLC SERPL-MCNC: 42.9 MG/DL
HGB BLD-MCNC: 15.1 G/DL (ref 13.5–17.5)
IMM GRANULOCYTES # BLD AUTO: 0.02 X10*3/UL (ref 0–0.5)
IMM GRANULOCYTES NFR BLD AUTO: 0.2 % (ref 0–0.9)
LDLC SERPL CALC-MCNC: 115 MG/DL
LYMPHOCYTES # BLD AUTO: 1.77 X10*3/UL (ref 0.8–3)
LYMPHOCYTES NFR BLD AUTO: 20.9 %
MCH RBC QN AUTO: 32.5 PG (ref 26–34)
MCHC RBC AUTO-ENTMCNC: 32.3 G/DL (ref 32–36)
MCV RBC AUTO: 101 FL (ref 80–100)
MONOCYTES # BLD AUTO: 0.64 X10*3/UL (ref 0.05–0.8)
MONOCYTES NFR BLD AUTO: 7.6 %
NEUTROPHILS # BLD AUTO: 5.93 X10*3/UL (ref 1.6–5.5)
NEUTROPHILS NFR BLD AUTO: 70 %
NON HDL CHOLESTEROL: 133 MG/DL (ref 0–149)
NRBC BLD-RTO: 0 /100 WBCS (ref 0–0)
PLATELET # BLD AUTO: 268 X10*3/UL (ref 150–450)
POTASSIUM SERPL-SCNC: 4.4 MMOL/L (ref 3.5–5.3)
PROT SERPL-MCNC: 6.7 G/DL (ref 6.4–8.2)
RBC # BLD AUTO: 4.64 X10*6/UL (ref 4.5–5.9)
SODIUM SERPL-SCNC: 146 MMOL/L (ref 136–145)
TRIGL SERPL-MCNC: 89 MG/DL (ref 0–149)
VLDL: 18 MG/DL (ref 0–40)
WBC # BLD AUTO: 8.5 X10*3/UL (ref 4.4–11.3)

## 2024-11-21 PROCEDURE — 1036F TOBACCO NON-USER: CPT | Performed by: FAMILY MEDICINE

## 2024-11-21 PROCEDURE — 1159F MED LIST DOCD IN RCRD: CPT | Performed by: FAMILY MEDICINE

## 2024-11-21 PROCEDURE — 80053 COMPREHEN METABOLIC PANEL: CPT

## 2024-11-21 PROCEDURE — 36415 COLL VENOUS BLD VENIPUNCTURE: CPT

## 2024-11-21 PROCEDURE — 85025 COMPLETE CBC W/AUTO DIFF WBC: CPT

## 2024-11-21 PROCEDURE — 3078F DIAST BP <80 MM HG: CPT | Performed by: FAMILY MEDICINE

## 2024-11-21 PROCEDURE — G2211 COMPLEX E/M VISIT ADD ON: HCPCS | Performed by: FAMILY MEDICINE

## 2024-11-21 PROCEDURE — 80061 LIPID PANEL: CPT

## 2024-11-21 PROCEDURE — 1160F RVW MEDS BY RX/DR IN RCRD: CPT | Performed by: FAMILY MEDICINE

## 2024-11-21 PROCEDURE — 3075F SYST BP GE 130 - 139MM HG: CPT | Performed by: FAMILY MEDICINE

## 2024-11-21 PROCEDURE — 1157F ADVNC CARE PLAN IN RCRD: CPT | Performed by: FAMILY MEDICINE

## 2024-11-21 PROCEDURE — 1123F ACP DISCUSS/DSCN MKR DOCD: CPT | Performed by: FAMILY MEDICINE

## 2024-11-21 PROCEDURE — 99214 OFFICE O/P EST MOD 30 MIN: CPT | Performed by: FAMILY MEDICINE

## 2024-11-21 RX ORDER — FLUOCINOLONE ACETONIDE 0.11 MG/ML
5 OIL AURICULAR (OTIC) DAILY PRN
Qty: 20 ML | Refills: 1 | Status: SHIPPED | OUTPATIENT
Start: 2024-11-21

## 2024-11-21 RX ORDER — CETIRIZINE HYDROCHLORIDE 10 MG/1
10 TABLET ORAL DAILY
COMMUNITY

## 2024-11-21 RX ORDER — MELOXICAM 15 MG/1
15 TABLET ORAL DAILY
Qty: 90 TABLET | Refills: 1 | Status: SHIPPED | OUTPATIENT
Start: 2024-11-21

## 2024-11-21 ASSESSMENT — ENCOUNTER SYMPTOMS
UNEXPECTED WEIGHT CHANGE: 0
CONSTIPATION: 0
PALPITATIONS: 0
VOMITING: 0

## 2024-11-21 NOTE — PROGRESS NOTES
Subjective   Patient ID: Jose Moran is a 74 y.o. male who presents for Follow-up (6 month, pt noticed his bp was high this morning at home ).    HPI   Patient has hx of stable hypertension, hyperlipidemia.  Pt denies chest pain, shortness of breath and edema.  Patient's current treatment as listed in Rx.  Patient is compliant with treatment and complains of no side effects associated treatment.  Planes of itchy scaly ears in the canals    Review of Systems   Constitutional:  Negative for unexpected weight change.   HENT:  Negative for congestion and ear discharge.    Cardiovascular:  Negative for chest pain and palpitations.   Gastrointestinal:  Negative for constipation and vomiting.   All other systems reviewed and are negative.      Objective   /65   Pulse 82   Wt 71.6 kg (157 lb 12.8 oz)   SpO2 98%   BMI 22.64 kg/m²     Physical Exam  HENT:      Head: Normocephalic and atraumatic.      Nose: Nose normal.      Mouth/Throat:      Mouth: Mucous membranes are moist.      Pharynx: No oropharyngeal exudate.   Eyes:      Extraocular Movements: Extraocular movements intact.      Conjunctiva/sclera: Conjunctivae normal.      Pupils: Pupils are equal, round, and reactive to light.   Cardiovascular:      Rate and Rhythm: Normal rate and regular rhythm.   Pulmonary:      Effort: Pulmonary effort is normal.      Breath sounds: Normal breath sounds.   Abdominal:      General: There is no distension.      Palpations: Abdomen is soft.   Musculoskeletal:      Cervical back: Normal range of motion and neck supple.   Lymphadenopathy:      Cervical: No cervical adenopathy.   Neurological:      General: No focal deficit present.      Mental Status: He is alert.   Psychiatric:         Attention and Perception: Attention normal.         Speech: Speech normal.         Behavior: Behavior is cooperative.         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      Continue statin  Follow Mediterranean diet and stay active         Relevant Orders    CBC and Auto Differential    Comprehensive Metabolic Panel    Lipid Panel    Primary hypertension - Primary I10     Controlled         Relevant Orders    CBC and Auto Differential    Comprehensive Metabolic Panel    Lipid Panel     Other Visit Diagnoses         Codes    Eczema of both external ears     H60.543    Relevant Medications    fluocinolone (DermOtic) 0.01 % ear drops    Other Relevant Orders    CBC and Auto Differential    Comprehensive Metabolic Panel    Lipid Panel        Reviewed labs  HM MAURICIO discussed and reviewed colonoscopy and path report and previous PSA  Recheck 6 months sooner if any issues arise

## 2024-11-22 ENCOUNTER — TELEPHONE (OUTPATIENT)
Dept: PRIMARY CARE | Facility: CLINIC | Age: 74
End: 2024-11-22
Payer: MEDICARE

## 2024-11-22 NOTE — TELEPHONE ENCOUNTER
Result Communication    Resulted Orders   CBC and Auto Differential   Result Value Ref Range    WBC 8.5 4.4 - 11.3 x10*3/uL    nRBC 0.0 0.0 - 0.0 /100 WBCs    RBC 4.64 4.50 - 5.90 x10*6/uL    Hemoglobin 15.1 13.5 - 17.5 g/dL    Hematocrit 46.8 41.0 - 52.0 %     (H) 80 - 100 fL    MCH 32.5 26.0 - 34.0 pg    MCHC 32.3 32.0 - 36.0 g/dL    RDW 13.2 11.5 - 14.5 %    Platelets 268 150 - 450 x10*3/uL    Neutrophils % 70.0 40.0 - 80.0 %    Immature Granulocytes %, Automated 0.2 0.0 - 0.9 %      Comment:      Immature Granulocyte Count (IG) includes promyelocytes, myelocytes and metamyelocytes but does not include bands. Percent differential counts (%) should be interpreted in the context of the absolute cell counts (cells/UL).    Lymphocytes % 20.9 13.0 - 44.0 %    Monocytes % 7.6 2.0 - 10.0 %    Eosinophils % 0.5 0.0 - 6.0 %    Basophils % 0.8 0.0 - 2.0 %    Neutrophils Absolute 5.93 (H) 1.60 - 5.50 x10*3/uL      Comment:      Percent differential counts (%) should be interpreted in the context of the absolute cell counts (cells/uL).    Immature Granulocytes Absolute, Automated 0.02 0.00 - 0.50 x10*3/uL    Lymphocytes Absolute 1.77 0.80 - 3.00 x10*3/uL    Monocytes Absolute 0.64 0.05 - 0.80 x10*3/uL    Eosinophils Absolute 0.04 0.00 - 0.40 x10*3/uL    Basophils Absolute 0.07 0.00 - 0.10 x10*3/uL   Comprehensive Metabolic Panel   Result Value Ref Range    Glucose 85 74 - 99 mg/dL    Sodium 146 (H) 136 - 145 mmol/L    Potassium 4.4 3.5 - 5.3 mmol/L    Chloride 105 98 - 107 mmol/L    Bicarbonate 27 21 - 32 mmol/L    Anion Gap 18 10 - 20 mmol/L    Urea Nitrogen 22 6 - 23 mg/dL    Creatinine 0.90 0.50 - 1.30 mg/dL    eGFR 90 >60 mL/min/1.73m*2      Comment:      Calculations of estimated GFR are performed using the 2021 CKD-EPI Study Refit equation without the race variable for the IDMS-Traceable creatinine methods.  https://jasn.asnjournals.org/content/early/2021/09/22/ASN.5796475444    Calcium 9.1 8.6 - 10.3 mg/dL     Albumin 4.3 3.4 - 5.0 g/dL    Alkaline Phosphatase 62 33 - 136 U/L    Total Protein 6.7 6.4 - 8.2 g/dL    AST 17 9 - 39 U/L    Bilirubin, Total 0.8 0.0 - 1.2 mg/dL    ALT 22 10 - 52 U/L      Comment:      Patients treated with Sulfasalazine may generate falsely decreased results for ALT.   Lipid Panel   Result Value Ref Range    Cholesterol 176 0 - 199 mg/dL      Comment:            Age      Desirable   Borderline High   High     0-19 Y     0 - 169       170 - 199     >/= 200    20-24 Y     0 - 189       190 - 224     >/= 225         >24 Y     0 - 199       200 - 239     >/= 240   **All ranges are based on fasting samples. Specific   therapeutic targets will vary based on patient-specific   cardiac risk.    Pediatric guidelines reference:Pediatrics 2011, 128(S5).Adult guidelines reference: NCEP ATPIII Guidelines,CHANTELLE 2001, 258:2486-97    Venipuncture immediately after or during the administration of Metamizole may lead to falsely low results. Testing should be performed immediately prior to Metamizole dosing.    HDL-Cholesterol 42.9 mg/dL      Comment:        Age       Very Low   Low     Normal    High    0-19 Y    < 35      < 40     40-45     ----  20-24 Y    ----     < 40      >45      ----        >24 Y      ----     < 40     40-60      >60      Cholesterol/HDL Ratio 4.1       Comment:        Ref Values  Desirable  < 3.4  High Risk  > 5.0    LDL Calculated 115 (H) <=99 mg/dL      Comment:                                  Near   Borderline      AGE      Desirable  Optimal    High     High     Very High     0-19 Y     0 - 109     ---    110-129   >/= 130     ----    20-24 Y     0 - 119     ---    120-159   >/= 160     ----      >24 Y     0 -  99   100-129  130-159   160-189     >/=190      VLDL 18 0 - 40 mg/dL    Triglycerides 89 0 - 149 mg/dL      Comment:      Age              Desirable        Borderline         High        Very High  SEX:B           mg/dL             mg/dL               mg/dL      mg/dL  <=14D                        ----               ----        ----  15D-365D                    ----               ----        ----  1Y-9Y           0-74               75-99             >=100       ----  10Y-19Y        0-89                            >=130       ----  20Y-24Y        0-114             115-149             >=150      ----  >= 25Y         0-149             150-199             200-499    >=500      Venipuncture immediately after or during the administration of Metamizole may lead to falsely low results. Testing should be performed immediately prior to Metamizole dosing.    Non HDL Cholesterol 133 0 - 149 mg/dL      Comment:            Age       Desirable   Borderline High   High     Very High     0-19 Y     0 - 119       120 - 144     >/= 145    >/= 160    20-24 Y     0 - 149       150 - 189     >/= 190      ----         >24 Y    30 mg/dL above LDL Cholesterol goal         9:29 AM      LVM

## 2024-11-22 NOTE — TELEPHONE ENCOUNTER
----- Message from Howard Wright sent at 11/22/2024  7:21 AM EST -----  Good cont same  Blood counts liver kidney tests and cholesterol panel are fine

## 2024-12-11 NOTE — PROGRESS NOTES
Klever Moran is a 74 year old man s/p C4-7 3-level Anterior Cervical Discectomy and Fusion (ACDF) on 04/23/21.     Doing well s/p left shoulder replacement 2/7/2024 with PT and OT following procedure for his shoulder. He has bad right shoulder as well, but not to the point where he wants surgery for it yet. Over the last 5 months or so, he has had occasional numbness, tingling radiating over right neck to shoulder and scapula and deltoid region. This get worse with head tiling to the right and sometimes when he lays down with head to the right. It's rarely painful, but sometimes he does have a little pain in right side of neck.     Cervical spine x-ray ordered for today and personally reviewed shows hardware in good position and stable alignment with evidence of interbody arthrodesis.     He has good strength on exam everywhere, no hyper-reflexia/carreno sign.  I think that his symptoms are probably coming from his cervical spine, but they are relatively minor and he has no concerning signs on exam. I ordered PT. If symptoms get worse, we will get MRI c spine with metal suppression protocol.     Jose Miguel Rosario MD    Prep Time On Date of the Patient Encounter:    10 Minutes  Time Directly with Patient/Family/Caregiver:    15 Minutes  Additional Time Spent on Patient Care Activities:   0 Minutes  Documentation Time:       5 Minutes  Other Time Spent:       0 Minutes    Details of Other Time Spent:      Total Time on Date of Patient Encounter:    30 Minutes

## 2024-12-12 ENCOUNTER — APPOINTMENT (OUTPATIENT)
Dept: NEUROSURGERY | Facility: CLINIC | Age: 74
End: 2024-12-12
Payer: MEDICARE

## 2024-12-12 ENCOUNTER — HOSPITAL ENCOUNTER (OUTPATIENT)
Dept: RADIOLOGY | Facility: HOSPITAL | Age: 74
Discharge: HOME | End: 2024-12-12
Payer: MEDICARE

## 2024-12-12 VITALS
SYSTOLIC BLOOD PRESSURE: 144 MMHG | HEIGHT: 70 IN | HEART RATE: 82 BPM | BODY MASS INDEX: 22.62 KG/M2 | DIASTOLIC BLOOD PRESSURE: 80 MMHG | WEIGHT: 158 LBS

## 2024-12-12 DIAGNOSIS — Z09 POSTOP CHECK: ICD-10-CM

## 2024-12-12 DIAGNOSIS — Z98.1 HISTORY OF CERVICAL SPINAL ARTHRODESIS: Primary | ICD-10-CM

## 2024-12-12 DIAGNOSIS — M25.512 LEFT SHOULDER PAIN, UNSPECIFIED CHRONICITY: ICD-10-CM

## 2024-12-12 DIAGNOSIS — M54.12 CERVICAL RADICULOPATHY: ICD-10-CM

## 2024-12-12 PROCEDURE — 1036F TOBACCO NON-USER: CPT | Performed by: NEUROLOGICAL SURGERY

## 2024-12-12 PROCEDURE — 1157F ADVNC CARE PLAN IN RCRD: CPT | Performed by: NEUROLOGICAL SURGERY

## 2024-12-12 PROCEDURE — 72040 X-RAY EXAM NECK SPINE 2-3 VW: CPT

## 2024-12-12 PROCEDURE — 1125F AMNT PAIN NOTED PAIN PRSNT: CPT | Performed by: NEUROLOGICAL SURGERY

## 2024-12-12 PROCEDURE — 1159F MED LIST DOCD IN RCRD: CPT | Performed by: NEUROLOGICAL SURGERY

## 2024-12-12 PROCEDURE — 3077F SYST BP >= 140 MM HG: CPT | Performed by: NEUROLOGICAL SURGERY

## 2024-12-12 PROCEDURE — 1123F ACP DISCUSS/DSCN MKR DOCD: CPT | Performed by: NEUROLOGICAL SURGERY

## 2024-12-12 PROCEDURE — 3008F BODY MASS INDEX DOCD: CPT | Performed by: NEUROLOGICAL SURGERY

## 2024-12-12 PROCEDURE — 3079F DIAST BP 80-89 MM HG: CPT | Performed by: NEUROLOGICAL SURGERY

## 2024-12-12 PROCEDURE — 99214 OFFICE O/P EST MOD 30 MIN: CPT | Performed by: NEUROLOGICAL SURGERY

## 2024-12-12 ASSESSMENT — ENCOUNTER SYMPTOMS
DEPRESSION: 0
LOSS OF SENSATION IN FEET: 0
OCCASIONAL FEELINGS OF UNSTEADINESS: 0

## 2024-12-12 ASSESSMENT — PAIN SCALES - GENERAL: PAINLEVEL_OUTOF10: 2

## 2024-12-13 ENCOUNTER — EVALUATION (OUTPATIENT)
Dept: PHYSICAL THERAPY | Facility: CLINIC | Age: 74
End: 2024-12-13
Payer: MEDICARE

## 2024-12-13 DIAGNOSIS — M43.6 STIFFNESS OF CERVICAL SPINE: Primary | ICD-10-CM

## 2024-12-13 DIAGNOSIS — M25.512 LEFT SHOULDER PAIN, UNSPECIFIED CHRONICITY: ICD-10-CM

## 2024-12-13 DIAGNOSIS — M54.12 CERVICAL RADICULOPATHY: ICD-10-CM

## 2024-12-13 DIAGNOSIS — R52 TINGLING PAIN: ICD-10-CM

## 2024-12-13 PROCEDURE — 97110 THERAPEUTIC EXERCISES: CPT | Mod: GP

## 2024-12-13 PROCEDURE — 97161 PT EVAL LOW COMPLEX 20 MIN: CPT | Mod: GP

## 2024-12-13 ASSESSMENT — ENCOUNTER SYMPTOMS
OCCASIONAL FEELINGS OF UNSTEADINESS: 0
LOSS OF SENSATION IN FEET: 0
DEPRESSION: 0

## 2024-12-13 NOTE — PROGRESS NOTES
"Physical Therapy    Physical Therapy Evaluation and Treatment      Patient Name: Klever Moran \"Jose\"  MRN: [id number]  Today's Date: 12/13/2024    Time Entry:   Time Calculation  Start Time: 1340  Stop Time: 1425  Time Calculation (min): 45 min  PT Evaluation Time Entry  PT Evaluation (Low) Time Entry: 37  PT Therapeutic Procedures Time Entry  Therapeutic Exercise Time Entry: 8                   Assessment:  Pt presents to PT with complaint of *** that began *** Pt's history and response to repeated movements makes provisional classification *** with a direction of preference for ***.   Pt will continue to be assessed over the next 3-5 sessions to confirm provisional classification and directional preference.   Pt will benefit from skilled PT services to address ROM/strength/functional limitations and pain noted during evaluation today in order to return patient to unrestricted PLOF.         Plan:       Current Problem:   1. Cervical radiculopathy  Referral to Physical Therapy    Follow Up In Physical Therapy      2. Left shoulder pain, unspecified chronicity  Referral to Physical Therapy          Subjective:   Chief Complaint: Patient presents to clinic due to intermittent right sided neck pain with spread to right shoulder and shoulder blade.  Tingling in right neck and into right shoulder. Right hand dominant.   Onset Date: 7/2024  SULEMAN: Denies injury or trauma.  Gradual onset.     PMH: Left TSA 2/2024, s/p C4-7 fusion 4/2021, h/o skin cancer, cochlear implant Left ear, R TKA 2/2022.  Current Condition:   Same    Pain:  Location: right sided neck pain with spread to right shoulder and shoulder blade.  Tingling in right neck and into right shoulder.   Description: dull throbbing and tingling  Aggravating Factors:  in the evening after sitting  Relieving Factors:  nothing in particular  Pain currently 1/10, worst at 4/10    Functional limitations:  Difficulty with prolonged sitting and lying down.     Patient " goals:  To relieve some of the pain and decrease the tingling.     Relevant Information (PMH & Previous Tests/Imaging):   XR Cervical Spine  IMPRESSION:  Anterior fusion C4 through C7  Degenerative disc height loss C3-4 and C7-T1    XR Right shoulder  IMPRESSION:  Interval development of moderate degenerative change of the  glenohumeral joint.    Previous Interventions/Treatments:   No recent treatment for neck.     Prior Level of Function (PLOF)  Patient previously independent with all ADLs  Exercise/Physical Activity: gym 3x/wk, stationary bike, arm exercises and leg presses  Work/School: retired     Patients Living Environment: Reviewed and no concern  Lives with wife in multiple story home.     Primary Language: English    Red Flags: Do you have any of the following? No  Fever/chills, unexplained weight changes, dizziness/fainting, unexplained change in bowel or bladder functions, unexplained malaise or muscle weakness, night pain/sweats.    General:     Precautions:       Objective    Posture: fair, min FH, slightly rotated R, B shoulders rounded and IR 'd, right shoulder depressed, B scapulae winged.  Correction: Better  Lateral shift nil  Neuro screen:   Myotomes:***  Dermatomes: intact to B light touch  Dural signs: ***    Cervical Spine movement Loss - Nil/Min/Mod/Max limit or degrees  Retraction: min loss, no pain  Protraction: no loss, no pain  Flexion: 40 deg, no pain  Extension: 42 deg, tightness at the end range  R rot: 52 deg, no pain  L rot: 55 deg, no pain  R SB: 28 deg, no pain  L SB: 25 deg, no pain    Repeated Movement Testing -  During/after/mechanical response:  Sx in sittin/10 right neck pain  Pro: x 10 reps - no change  Ret: x 10 reps - increased pain in B neck at end range  Ret/ext:  x 10 reps - increased pain in right neck   L rot : x 10 reps - no change  R rot: x 10 reps - increased right neck pain  Flexion:  x 10 reps - at baseline    Shoulder ROM:  Flexion R 160  "deg A, no pain,    L 140 deg A, no pain  Abduction R 145 deg A stiffness,   L 110 deg A , no pain  Extension R 60 deg A no pain, L 40 deg no pain  IR R WFL, L decreased due to TSA  ER R WFL, L WFL    Strength:  Shoulder  Left shoulder grossly 4+/5 -5/5 without pain  Right shoulder  grossly 4/5 without pain  Elbow grossly B 5/5  Weakness in scapular stabilizers as evident by posture and movement of shoulders.       Flexibility:  UT   R fair ,    L fair,   LS    R fair     L fair     Outcome Measures:  {PT Outcome Measures:55576}     Treatments:  {PT Treatments:02638}    EDUCATION:  Access Code: [email or web address]  URL: [email or web address]  Date: 12/13/2024  Prepared by: Ginny Prajapati    Exercises  - Supine Cervical Retraction with Towel  - 2 x daily - 7 x weekly - 2 sets - 10 reps - 5\" hold  - Supine Cervical Rotation AROM on Pillow  - 2 x daily - 7 x weekly - 2 sets - 10 reps       Goals:            "

## 2024-12-16 PROBLEM — R52 TINGLING PAIN: Status: ACTIVE | Noted: 2024-12-16

## 2024-12-16 PROBLEM — M43.6 STIFFNESS OF CERVICAL SPINE: Status: ACTIVE | Noted: 2024-12-16

## 2024-12-16 NOTE — PROGRESS NOTES
"Physical Therapy    Physical Therapy Evaluation and Treatment      Patient Name: Klever Moran \"Jose\"  MRN: [id number]  Today's Date: 12/13/24    Time Entry:   Time Calculation  Start Time: 1340  Stop Time: 1425  Time Calculation (min): 45 min  PT Evaluation Time Entry  PT Evaluation (Low) Time Entry: 37  PT Therapeutic Procedures Time Entry  Therapeutic Exercise Time Entry: 8        Assessment:  Pt presents to PT with complaint of intermittent right sided neck pain/tingling with spread to right shoulder and shoulder blade that began 7/2024.   Pt's history and response to repeated movements makes provisional classification cervical derangement with a direction of preference for supine cervical retraction and possibly cervical rotation right.   Pt will continue to be assessed over the next 3-5 sessions to confirm provisional classification and directional preference.     Patient also presents with faulty postural alignment, painful and restricted cervical AROM, weakness in scap stabilizers, restricted B shoulder ROM and strength, muscle restrictions in neck and upper quarter B.   Pt will benefit from skilled PT services to address ROM/strength/functional limitations and pain noted during evaluation today in order to return patient to unrestricted PLOF.         Plan:  OP PT Plan  Treatment/Interventions: Cryotherapy, Dry needling, Education/ Instruction, Manual therapy, Therapeutic activities, Therapeutic exercises, Taping techniques  PT Plan: Skilled PT  PT Frequency: 2 times per week  Duration: 4-6 wks  Onset Date: 12/12/24  Certification Period Start Date: 12/13/24  Certification Period End Date: 03/12/25  Number of Treatments Authorized: MN  Rehab Potential: Good  Plan of Care Agreement: Patient    Current Problem:   1. Stiffness of cervical spine        2. Cervical radiculopathy  Referral to Physical Therapy    Follow Up In Physical Therapy      3. Left shoulder pain, unspecified chronicity  Referral to Physical " Therapy      4. Tingling pain            Subjective:   Chief Complaint: Patient presents to clinic due to intermittent right sided neck pain with spread to right shoulder and shoulder blade.  Tingling in right neck and into right shoulder. Right hand dominant.   Onset Date: 7/2024  SULEMAN: Denies injury or trauma.  Gradual onset.     PMH: Left TSA 2/2024, s/p C4-7 fusion 4/2021, h/o skin cancer, cochlear implant Left ear, R TKA 2/2022.  Current Condition:   Same    Pain:  Location: right sided neck pain with spread to right shoulder and shoulder blade.  Tingling in right neck and into right shoulder.   Description: dull throbbing and tingling  Aggravating Factors:  in the evening after sitting  Relieving Factors:  nothing in particular  Pain currently 1/10, worst at 4/10    Functional limitations:  Difficulty with prolonged sitting and lying down.     Patient goals:  To relieve some of the pain and decrease the tingling.     Relevant Information (PMH & Previous Tests/Imaging):   XR Cervical Spine  IMPRESSION:  Anterior fusion C4 through C7  Degenerative disc height loss C3-4 and C7-T1    XR Right shoulder  IMPRESSION:  Interval development of moderate degenerative change of the  glenohumeral joint.    Previous Interventions/Treatments:   No recent treatment for neck.     Prior Level of Function (PLOF)  Patient previously independent with all ADLs  Exercise/Physical Activity: gym 3x/wk, stationary bike, arm exercises and leg presses  Work/School: retired     Patients Living Environment: Reviewed and no concern  Lives with wife in multiple story home.     Primary Language: English    Red Flags: Do you have any of the following? No  Fever/chills, unexplained weight changes, dizziness/fainting, unexplained change in bowel or bladder functions, unexplained malaise or muscle weakness, night pain/sweats.    General:  General  Reason for Referral: Cervical Radiculopathy M54.12, Left shoulder pain  M25.512  Referred By: Dr. Jose Miguel Rosario  Past Medical History Relevant to Rehab: HTN, hearing loss - L cochlear implant , GERD, skin RYAN, spondylolisthesis, left shoulder pain, B knee pain, s/p lumbar laminectomy, OA, lumbar spinal stenosis,Anterior fusion C4 through C7  Degenerative disc height loss C3-4 and C7-T1  General Comment: Visit 1;  Insurance: MEDICARE A/B - NO AUTH / [place] / $94.44 USED  OT / [facility]      Precautions:  Precautions  STEADI Fall Risk Score (The score of 4 or more indicates an increased risk of falling): 3  Precautions Comment: Cochlear implant L, Anterior fusion C4 through C7  Degenerative disc height loss C3-4 and C7-T1        Objective   Posture: fair, min FH, slightly rotated R, B shoulders rounded and IR 'd, right shoulder depressed, B scapulae winged.  Correction: Better  Lateral shift nil  Neuro screen:   Dermatomes: intact to B light touch      Cervical Spine movement Loss - Nil/Min/Mod/Max limit or degrees  Retraction: min loss, no pain  Protraction: no loss, no pain  Flexion: 40 deg, no pain  Extension: 42 deg, tightness at the end range  R rot: 52 deg, no pain  L rot: 55 deg, no pain  R SB: 28 deg, no pain  L SB: 25 deg, no pain    Repeated Movement Testing -  During/after/mechanical response:  Sx in sittin/10 right neck pain  Pro: x 10 reps - no change  Ret: x 10 reps - increased pain in B neck at end range  Ret/ext:  x 10 reps - increased pain in right neck   L rot : x 10 reps - no change  R rot: x 10 reps - increased right neck pain  Flexion:  x 10 reps - at baseline    Shoulder ROM:  Flexion R 160 deg A, no pain,    L 140 deg A, no pain  Abduction R 145 deg A stiffness,   L 110 deg A , no pain  Extension R 60 deg A no pain, L 40 deg no pain  IR R WFL, L decreased due to TSA  ER R WFL, L WFL    Strength:  Shoulder  Left shoulder grossly 4+/5 -5/5 without pain  Right shoulder  grossly 4/5 without pain  Elbow grossly B 5/5  Weakness in scapular stabilizers as  "evident by posture and movement of shoulders.       Flexibility:  UT   R fair ,    L fair,   LS    R fair     L fair   Pectorals R fair L fair    Outcome Measures:  Other Measures  Neck Disability Index: score 11, 22% disability       Treatments:  Instructed in and performed the following exercises to initiate HEP:  Supine  Cervical retraction 5\" hold 2 x 10 reps  Cervical rotation right 5\" hold 2 x 10 reps  Discussed POC, posture and importance of compliance with HEP.     EDUCATION:  Access Code: [email or web address]  URL: [email or web address]  Date: 12/13/2024  Prepared by: Ginny Prajapati    Exercises  - Supine Cervical Retraction with Towel  - 2 x daily - 7 x weekly - 2 sets - 10 reps - 5\" hold  - Supine Cervical Rotation AROM on Pillow  - 2 x daily - 7 x weekly - 2 sets - 10 reps       Goals:  Active       PT Problem       Pain/tingling       Start:  12/13/24    Expected End:  02/11/25       Patient will report reduction of pain/tingling by  80%  to increase tolerance for prolonged sitting and lying down.         ROM       Start:  12/13/24    Expected End:  02/11/25       Patient will increase Cervical AROM  and B shoulder AROM by 15-20 deg where needed without increase in pain  to ease performance of ADLs, leisure activities and turning head R/L while driving.           Strength       Start:  12/13/24    Expected End:  02/11/25       Patient will increase bilateral scapular strength by 1/3 MMT  in order to improve posture, functional use of BUE's  and decrease pain to return patient to pain-free PLOF.            Flexibility       Start:  12/13/24    Expected End:  02/11/25       Patient will demonstrate increased B upper quarter and neck  flexibility to Good  in order to decrease pain,tingling,  improve positioning and/or promote improved functional mobility.          HEP       Start:  12/13/24    Expected End:  02/11/25       Patient will be independent and compliant with safe and recommended  HEP " to enhance functional progress and long term management of condition.           Outcome       Start:  12/13/24    Expected End:  02/11/25       Patient will improve outcome measures score on  NDI by 4 pts  to show a clinically significant improvement  in functional mobility.

## 2024-12-18 ENCOUNTER — TREATMENT (OUTPATIENT)
Dept: PHYSICAL THERAPY | Facility: CLINIC | Age: 74
End: 2024-12-18
Payer: MEDICARE

## 2024-12-18 DIAGNOSIS — M54.12 CERVICAL RADICULOPATHY: ICD-10-CM

## 2024-12-18 DIAGNOSIS — M25.512 LEFT SHOULDER PAIN, UNSPECIFIED CHRONICITY: ICD-10-CM

## 2024-12-18 DIAGNOSIS — M43.6 STIFFNESS OF CERVICAL SPINE: Primary | ICD-10-CM

## 2024-12-18 DIAGNOSIS — R52 TINGLING PAIN: ICD-10-CM

## 2024-12-18 PROCEDURE — 97110 THERAPEUTIC EXERCISES: CPT | Mod: GP,CQ

## 2024-12-18 PROCEDURE — 97140 MANUAL THERAPY 1/> REGIONS: CPT | Mod: GP,CQ

## 2024-12-18 NOTE — PROGRESS NOTES
"Physical Therapy                                                                                  Physical Therapy Treatment       Patient name: Klever Moran \"Don\"  MRN:   [id number]  Today's Date: 12/18/24     Time Calculation  Start Time: 1000  Stop Time: 1055  Time Calculation (min): 55 min  1. Stiffness of cervical spine        2. Cervical radiculopathy  Follow Up In Physical Therapy      3. Left shoulder pain, unspecified chronicity        4. Tingling pain               General  Reason for Referral: Cervical Radiculopathy M54.12, Left shoulder pain M25.512  Referred By: Dr. Jose Miguel Rosario  Past Medical History Relevant to Rehab: HTN, hearing loss - L cochlear implant , GERD, skin RYAN, spondylolisthesis, left shoulder pain, B knee pain, s/p lumbar laminectomy, OA, lumbar spinal stenosis,Anterior fusion C4 through C7  Degenerative disc height loss C3-4 and C7-T1   Visit 2;  Insurance: MEDICARE A/B - NO AUTH / [place] / $94.44 USED 2024 OT / [facility]  Precautions  Cochlear implant L, Anterior fusion C4 through C7  Degenerative disc height loss C3-4 and C7-T1     Subjective: No change since previous session  Response to last session:     Performing HEP: yes , no effect on symptoms +/-    Pain   2/10 tingling  Objective:  Reduce pain/symptom by 80%, improve cervical AROM , improve  cervical and scapular strength  Treatment:  Therapeutic exercise 45 minutes  Seated cervical retraction 10 reps 5\" holds   Seated cervical retraction with rotation to R 10 reps 3-5\" holds    Seated Horizontally placed small bolster placed behind back thoracic stretch with cervical retraction and small range extension 10\" holds x 6 reps  Supine vertical small bolster placed thoracic region x 3 minutes  Supine (on bolster) wand SA lifts 10 reps 3-5\" holds  Supine (on bolster)  Supine Cervical Retraction on pillow 2 sets of 10    Supine (on bolster) Cervical Rotation AROM on Pillow 2 sets of 10  Horizontal Abduction palms down 20 " reps  Wand over head shoulder flexion lifts 20 reps    Manual techniques to reduce tightness and improve ease with motion  Pt supine STM, CFM to scapula mm mid and upper traps and occipital release x 10 minutes    Assessment: Pt completed activity today without change +/- with radiating symptoms .  Was able  to have schedulers move pt  back  (for next session)to evaluating PT schedule . Eval PT was to  continue to assess over the next 3-5 sessions to confirm provisional classification and directional preference as was documented in evaluation..   Plan:    OP PT Plan  Treatment/Interventions: Cryotherapy, Dry needling, Education/ Instruction, Manual therapy, Therapeutic activities, Therapeutic exercises, Taping techniques  PT Plan: Skilled PT  PT Frequency: 2 times per week  Duration: 4-6 wks  Onset Date: 12/12/24  Certification Period Start Date: 12/13/24  Certification Period End Date: 03/12/25  Number of Treatments Authorized: MN   Education:   No additions to HEP    Gracie Gaspar, RAH    Active       PT Problem       Pain/tingling       Start:  12/13/24    Expected End:  02/11/25       Patient will report reduction of pain/tingling by  80%  to increase tolerance for prolonged sitting and lying down.         ROM       Start:  12/13/24    Expected End:  02/11/25       Patient will increase Cervical AROM  and B shoulder AROM by 15-20 deg where needed without increase in pain  to ease performance of ADLs, leisure activities and turning head R/L while driving.           Strength       Start:  12/13/24    Expected End:  02/11/25       Patient will increase bilateral scapular strength by 1/3 MMT  in order to improve posture, functional use of BUE's  and decrease pain to return patient to pain-free PLOF.            Flexibility       Start:  12/13/24    Expected End:  02/11/25       Patient will demonstrate increased B upper quarter and neck  flexibility to Good  in order to decrease pain,tingling,  improve  positioning and/or promote improved functional mobility.          HEP       Start:  12/13/24    Expected End:  02/11/25       Patient will be independent and compliant with safe and recommended  HEP to enhance functional progress and long term management of condition.           Outcome       Start:  12/13/24    Expected End:  02/11/25       Patient will improve outcome measures score on  NDI by 4 pts  to show a clinically significant improvement  in functional mobility.

## 2024-12-20 ENCOUNTER — TREATMENT (OUTPATIENT)
Dept: PHYSICAL THERAPY | Facility: CLINIC | Age: 74
End: 2024-12-20
Payer: MEDICARE

## 2024-12-20 DIAGNOSIS — M25.512 LEFT SHOULDER PAIN, UNSPECIFIED CHRONICITY: ICD-10-CM

## 2024-12-20 DIAGNOSIS — M54.12 CERVICAL RADICULOPATHY: ICD-10-CM

## 2024-12-20 DIAGNOSIS — M43.6 STIFFNESS OF CERVICAL SPINE: Primary | ICD-10-CM

## 2024-12-20 DIAGNOSIS — R52 TINGLING PAIN: ICD-10-CM

## 2024-12-20 PROCEDURE — 97110 THERAPEUTIC EXERCISES: CPT | Mod: GP

## 2024-12-20 NOTE — PROGRESS NOTES
"Physical Therapy                                                                                  Physical Therapy Treatment       Patient name: Klever Moran \"Jose\"  MRN:   [id number]  Today's Date: 12/20/2024  3      Time Calculation  Start Time: 1330  Stop Time: 1414  Time Calculation (min): 44 min    Assessment:  No change in tingling reported with any repeated cervical movements today, however after performing supine scapular strengthening patient reported some decrease in tingling in R neck.   After this, the patient reported increased tingling when he side bent neck to the right. Recommend continue with current exercises progressing scapular ans shoulder strengthening as tolerated.   Patient will continue to benefit from PT to work towards goals of decreasing neck pain and tingling.      Plan:    May add gentle cervical distraction in supine as tolerated.   Monitor response to treatment and adjust plan as needed.   To continue with current POC with emphasis on cervical ROM and postural/scapular strengthening.       Current Problem  1. Stiffness of cervical spine        2. Cervical radiculopathy  Follow Up In Physical Therapy      3. Left shoulder pain, unspecified chronicity        4. Tingling pain          General:  General  Reason for Referral: Cervical Radiculopathy M54.12, Left shoulder pain M25.512  Referred By: Dr. Jose Miguel Rosario  Past Medical History Relevant to Rehab: HTN, hearing loss - L cochlear implant , GERD, skin RYAN, spondylolisthesis, left shoulder pain, B knee pain, s/p lumbar laminectomy, OA, lumbar spinal stenosis,Anterior fusion C4 through C7  Degenerative disc height loss C3-4 and C7-T1  General Comment: Visit 3   Insurance: MEDICARE A/B - NO AUTH / [place] / $94.44 USED 2024 OT / [facility]     Precautions:  Precautions  STEADI Fall Risk Score (The score of 4 or more indicates an increased risk of falling): 3  Precautions Comment: Cochlear implant L, Anterior fusion C4 through C7  " "Degenerative disc height loss C3-4 and C7-T1       Subjective:  Patient reported experiencing less neck pain, but no change in tingling.     Response to last session:   \"great\"  Performing HEP: Yes     Pain  0/10 at the start  and end of session     Treatment:    Therapeutic exercise     Seated   Cervical retraction 10 reps 5\" holds   Cervical retraction with self overpressure 5\" hold 2 x 10 reps  Cervical rotation R  5\" x 10  Cervical rotation  R with self overpressure 5\" hold  x 10   Cervical rotation L 5\" x 10  Cervical rotation L with self overpressure 5\" x 10 reps   Cervical side bend L 5\" x 10  Cervical side bend  L with self overpressure 5\" x 10 reps   Cervical side bend R 5\" x 10  Cervical side bend  R  with self overpressure 5\" x 10 reps     Supine  B shoulder horizontal ABD with orange theraband with palms up/palms down 2 x 10 each  R/L UE D2 flexion with orange theraband 2 x 10 each  B shoulder flexion with wand 2 x 10 reps  B shoulder ER with orange theraband 2 x 10 reps    Education:  Written instructions and orange theraband provided for updated HEP.   Access Code: [email or web address]  URL: [email or web address]  Date: 12/20/2024  Prepared by: Ginny Prajapati    Exercises  - Supine Cervical Retraction with Towel  - 2 x daily - 7 x weekly - 2 sets - 10 reps - 5\" hold  - Supine Cervical Rotation AROM on Pillow  - 2 x daily - 7 x weekly - 2 sets - 10 reps  - Supine Shoulder Horizontal Abduction with Resistance  - 1 x daily - 7 x weekly - 2 sets - 10 reps  - Supine PNF D2 Flexion with Resistance  - 1 x daily - 7 x weekly - 2 sets - 10 reps  - Supine Shoulder External Rotation with Resistance  - 1 x daily - 7 x weekly - 2 sets - 10 reps     Goals    Active       PT Problem       Pain/tingling       Start:  12/13/24    Expected End:  02/11/25       Patient will report reduction of pain/tingling by  80%  to increase tolerance for prolonged sitting and lying down.         ROM       Start:  12/13/24   "  Expected End:  02/11/25       Patient will increase Cervical AROM  and B shoulder AROM by 15-20 deg where needed without increase in pain  to ease performance of ADLs, leisure activities and turning head R/L while driving.           Strength       Start:  12/13/24    Expected End:  02/11/25       Patient will increase bilateral scapular strength by 1/3 MMT  in order to improve posture, functional use of BUE's  and decrease pain to return patient to pain-free PLOF.            Flexibility       Start:  12/13/24    Expected End:  02/11/25       Patient will demonstrate increased B upper quarter and neck  flexibility to Good  in order to decrease pain,tingling,  improve positioning and/or promote improved functional mobility.          HEP       Start:  12/13/24    Expected End:  02/11/25       Patient will be independent and compliant with safe and recommended  HEP to enhance functional progress and long term management of condition.           Outcome       Start:  12/13/24    Expected End:  02/11/25       Patient will improve outcome measures score on  NDI by 4 pts  to show a clinically significant improvement  in functional mobility.

## 2024-12-31 DIAGNOSIS — E78.2 MIXED HYPERLIPIDEMIA: ICD-10-CM

## 2024-12-31 RX ORDER — ROSUVASTATIN CALCIUM 5 MG/1
5 TABLET, COATED ORAL DAILY
Qty: 90 TABLET | Refills: 3 | Status: SHIPPED | OUTPATIENT
Start: 2024-12-31

## 2025-01-06 NOTE — PROGRESS NOTES
"Physical Therapy                                                                                  Physical Therapy Treatment       Patient name: Klever Moran \"Don\"  MRN:   [id number]  Today's Date: 1/7/25     Time Calculation  Start Time: 1326  Stop Time: 1415  Time Calculation (min): 49 min  1. Stiffness of cervical spine        2. Cervical radiculopathy  Follow Up In Physical Therapy      3. Left shoulder pain, unspecified chronicity        4. Tingling pain               General  Reason for Referral: Cervical Radiculopathy M54.12, Left shoulder pain M25.512  Referred By: Dr. Jose Miguel Rosario  Past Medical History Relevant to Rehab: HTN, hearing loss - L cochlear implant , GERD, skin RYAN, spondylolisthesis, left shoulder pain, B knee pain, s/p lumbar laminectomy, OA, lumbar spinal stenosis,Anterior fusion C4 through C7  Degenerative disc height loss C3-4 and C7-T1   Visit 4;  Insurance: MEDICARE A/B - NO AUTH / [place] / $94.44 USED 2024 OT / [facility]  Precautions  Cochlear implant L, Anterior fusion C4 through C7  Degenerative disc height loss C3-4 and C7-T1     Subjective: Less tingling , improving symptoms , no soreness. 'Likes ' vertical bolster for positioning and stretch it provides  Response to last session:  good    Performing HEP: yes    Pain   0/10  Objective:  Reduce pain/symptom by 80%, improve cervical AROM , improve cervical and scapular strength     Treatment:    Therapeutic exercise 47 minutes     Seated using mirror feed back  Cervical retraction 20 reps 5\" holds ball press to wall  Cervical rotation R  5\" x 20  Cervical retraction with self overpressure 5\" holds  ball press to wall 20 reps  Cervical rotation L/R x 10 each  Cervical rotation  R with self overpressure 5\" hold  x 10   Cervical rotation L with self overpressure 5\" x 10 reps   Cervical side bend L 5\" x 10  Cervical side bend  L with self overpressure 5\" x 10 reps (did not recall , did not perform)  Cervical side bend R 5\" x " "10  Cervical side bend  R  with self overpressure 5\" x 10 reps (did not recall , did not perform)     Supine with small bolster placed vertically  B shoulder horizontal ABD with orange theraband with palms up/palms down 2 x 15 each  R/L UE D2 flexion with orange theraband 2 x 15 each  B shoulder flexion with wand 30 reps  B shoulder ER with orange theraband 30 reps    Assessment:  Favorable response to current POC with symptoms of tingling decreasing and pt without pain    Plan:    OP PT Plan  Treatment/Interventions: Cryotherapy, Dry needling, Education/ Instruction, Manual therapy, Therapeutic activities, Therapeutic exercises, Taping techniques  PT Plan: Skilled PT  PT Frequency: 2 times per week  Duration: 4-6 wks  Onset Date: 12/12/24  Certification Period Start Date: 12/13/24  Certification Period End Date: 03/12/25  Number of Treatments Authorized: [place]   Education:   No additions to HEP  Provided verbal instruction to make vertical roll for self stretch at home  Gracie Gaspar, PTA    Active       PT Problem       Pain/tingling       Start:  12/13/24    Expected End:  02/11/25       Patient will report reduction of pain/tingling by  80%  to increase tolerance for prolonged sitting and lying down.         ROM       Start:  12/13/24    Expected End:  02/11/25       Patient will increase Cervical AROM  and B shoulder AROM by 15-20 deg where needed without increase in pain  to ease performance of ADLs, leisure activities and turning head R/L while driving.           Strength       Start:  12/13/24    Expected End:  02/11/25       Patient will increase bilateral scapular strength by 1/3 MMT  in order to improve posture, functional use of BUE's  and decrease pain to return patient to pain-free PLOF.            Flexibility       Start:  12/13/24    Expected End:  02/11/25       Patient will demonstrate increased B upper quarter and neck  flexibility to Good  in order to decrease pain,tingling,  improve " positioning and/or promote improved functional mobility.          HEP       Start:  12/13/24    Expected End:  02/11/25       Patient will be independent and compliant with safe and recommended  HEP to enhance functional progress and long term management of condition.           Outcome       Start:  12/13/24    Expected End:  02/11/25       Patient will improve outcome measures score on  NDI by 4 pts  to show a clinically significant improvement  in functional mobility.

## 2025-01-07 ENCOUNTER — TREATMENT (OUTPATIENT)
Dept: PHYSICAL THERAPY | Facility: CLINIC | Age: 75
End: 2025-01-07
Payer: MEDICARE

## 2025-01-07 DIAGNOSIS — M25.512 LEFT SHOULDER PAIN, UNSPECIFIED CHRONICITY: ICD-10-CM

## 2025-01-07 DIAGNOSIS — M43.6 STIFFNESS OF CERVICAL SPINE: Primary | ICD-10-CM

## 2025-01-07 DIAGNOSIS — M54.12 CERVICAL RADICULOPATHY: ICD-10-CM

## 2025-01-07 DIAGNOSIS — R52 TINGLING PAIN: ICD-10-CM

## 2025-01-07 PROCEDURE — 97110 THERAPEUTIC EXERCISES: CPT | Mod: GP,CQ

## 2025-01-15 ENCOUNTER — TREATMENT (OUTPATIENT)
Dept: PHYSICAL THERAPY | Facility: CLINIC | Age: 75
End: 2025-01-15
Payer: MEDICARE

## 2025-01-15 DIAGNOSIS — R52 TINGLING PAIN: ICD-10-CM

## 2025-01-15 DIAGNOSIS — M43.6 STIFFNESS OF CERVICAL SPINE: Primary | Chronic | ICD-10-CM

## 2025-01-15 DIAGNOSIS — M25.512 LEFT SHOULDER PAIN, UNSPECIFIED CHRONICITY: ICD-10-CM

## 2025-01-15 DIAGNOSIS — M54.12 CERVICAL RADICULOPATHY: ICD-10-CM

## 2025-01-15 PROCEDURE — 97110 THERAPEUTIC EXERCISES: CPT | Mod: GP,CQ

## 2025-01-15 NOTE — PROGRESS NOTES
"Physical Therapy                                                                                  Physical Therapy Treatment       Patient name: Klever Moran \"Don\"  MRN:   [id number]  Today's Date: 1/15/25     Time Calculation  Start Time: 0945  Stop Time: 1030  Time Calculation (min): 45 min  1. Stiffness of cervical spine        2. Cervical radiculopathy  Follow Up In Physical Therapy      3. Left shoulder pain, unspecified chronicity        4. Tingling pain               General  Reason for Referral: Cervical Radiculopathy M54.12, Left shoulder pain M25.512  Referred By: Dr. Jose Miguel Rosario  Past Medical History Relevant to Rehab: HTN, hearing loss - L cochlear implant , GERD, skin RYAN, spondylolisthesis, left shoulder pain, B knee pain, s/p lumbar laminectomy, OA, lumbar spinal stenosis,Anterior fusion C4 through C7  Degenerative disc height loss C3-4 and C7-T1  General Comment: Visit 3;  Insurance: MEDICARE A/B - NO AUTH / [place] / $94.44 USED 2024 OT / [facility]  Precautions Comment: Cochlear implant L, Anterior fusion C4 through C7  Degenerative disc height loss C3-4 and C7-T1     Subjective: Flare up R shoulder pain from last Wednesday, calming down to 1/10 today . Doc wants to do a shoulder replacement , trying to hold out.  Did have L replaced with good results , took about a year to rehab. Slight amount of tingling felt R UT   Response to last session: good        Pain   1/10 R shoulder start of session  0/10 R shoulder end of session  Objective:  Reduce pain/symptom by 80%, improve cervical AROM , improve cervical and scapular strength   Treatment:  Therapeutic Exercise 45 minutes  Overhead pulleys with focus on stretch R shoulder flexion 10\" x 10' Abduction 10\" x 10 reps  Cervical retraction 10 reps 5\" Cervical rotation R  5\" x 20  Cervical retraction with self overpressure 5\" holds 10 reps  Anchored hand R UT stretch to 10\" x 6   Cervical rotation L/R x 10 each  Cervical rotation  R with self " "overpressure 5\" hold  x 10   Cervical rotation L with self overpressure 5\" x 10 reps   Cervical side bend L 5\" x 10  Cervical side bend R 5\" x 10  Cervical side bend L with self overpressure using tie support  5\" x 10 reps   Supine with foam 6\" bolster  placed vertically allowing for 2 minutes of stretch then proceeded with  B shoulder horizontal ABD with orange theraband with palms up/palms down 2 x 15 each  Switched from foam roll to smaller bolster prior to completion of   R/L UE D2 flexion with orange theraband 2 x 15 each  B shoulder flexion with 3# wand 15 reps  B shoulder ER with orange theraband 15 reps    Assessment: R shoulder felt ' loosened up and better' at end of session   Tolerated previous session well . No complaints voiced  during , or immed. following PT session today  Plan:    OP PT Plan  Treatment/Interventions: Cryotherapy, Dry needling, Education/ Instruction, Manual therapy, Therapeutic activities, Therapeutic exercises, Taping techniques  PT Plan: Skilled PT  PT Frequency: 2 times per week  Duration: 4-6 wks  Onset Date: 12/12/24  Certification Period Start Date: 12/13/24  Certification Period End Date: 03/12/25  Number of Treatments Authorized: [place]   Education:   No additions to RONNA Gaspar PTA    Active       PT Problem       Pain/tingling       Start:  12/13/24    Expected End:  02/11/25       Patient will report reduction of pain/tingling by  80%  to increase tolerance for prolonged sitting and lying down.         ROM       Start:  12/13/24    Expected End:  02/11/25       Patient will increase Cervical AROM  and B shoulder AROM by 15-20 deg where needed without increase in pain  to ease performance of ADLs, leisure activities and turning head R/L while driving.           Strength       Start:  12/13/24    Expected End:  02/11/25       Patient will increase bilateral scapular strength by 1/3 MMT  in order to improve posture, functional use of BUE's  and decrease pain to " return patient to pain-free PLOF.            Flexibility       Start:  12/13/24    Expected End:  02/11/25       Patient will demonstrate increased B upper quarter and neck  flexibility to Good  in order to decrease pain,tingling,  improve positioning and/or promote improved functional mobility.          HEP       Start:  12/13/24    Expected End:  02/11/25       Patient will be independent and compliant with safe and recommended  HEP to enhance functional progress and long term management of condition.           Outcome       Start:  12/13/24    Expected End:  02/11/25       Patient will improve outcome measures score on  NDI by 4 pts  to show a clinically significant improvement  in functional mobility.

## 2025-01-21 ENCOUNTER — TREATMENT (OUTPATIENT)
Dept: PHYSICAL THERAPY | Facility: CLINIC | Age: 75
End: 2025-01-21
Payer: MEDICARE

## 2025-01-21 DIAGNOSIS — M43.6 STIFFNESS OF CERVICAL SPINE: Primary | ICD-10-CM

## 2025-01-21 DIAGNOSIS — M54.12 CERVICAL RADICULOPATHY: ICD-10-CM

## 2025-01-21 DIAGNOSIS — R52 TINGLING PAIN: ICD-10-CM

## 2025-01-21 DIAGNOSIS — M25.512 LEFT SHOULDER PAIN, UNSPECIFIED CHRONICITY: ICD-10-CM

## 2025-01-21 PROCEDURE — 97110 THERAPEUTIC EXERCISES: CPT | Mod: GP

## 2025-01-21 NOTE — PROGRESS NOTES
"Physical Therapy                                                                                  Physical Therapy Treatment       Patient name: Klever Moran \"Don\"  MRN:   [id number]  Today's Date: 1/21/2025  6      Time Calculation  Start Time: 1515  Stop Time: 1557  Time Calculation (min): 42 min    Assessment:  Patient completed session today with mod effort.  Patient is progressing very well with present program.  Patient reports no neck pain and no tingling for 5 days.  Right shoulder continues to bother him due to arthritis.   Reports no symptoms during or after session.     Plan:   Re-eval next visit.  If no recurrence of tingling in RUE will plan for discharge. Finalize HEP prior to discharge.       Current Problems  1. Stiffness of cervical spine        2. Cervical radiculopathy  Follow Up In Physical Therapy      3. Left shoulder pain, unspecified chronicity        4. Tingling pain          General  Reason for Referral: Cervical Radiculopathy M54.12, Left shoulder pain M25.512  Referred By: Dr. Jose Miguel Rosario  Past Medical History Relevant to Rehab: HTN, hearing loss - L cochlear implant , GERD, skin RYAN, spondylolisthesis, left shoulder pain, B knee pain, s/p lumbar laminectomy, OA, lumbar spinal stenosis,Anterior fusion C4 through C7  Degenerative disc height loss C3-4 and C7-T1  General Comment: Visit 6    Insurance: MEDICARE A/B - NO AUTH / [place] / $94.44 USED 2024 OT / [facility]  Precautions Comment: Cochlear implant L, Anterior fusion C4 through C7  Degenerative disc height loss C3-4 and C7-T1     Subjective:  Patient reported doing well. Tingling is almost not existent.  No neck pain.  Shoulder  pain in right is intermittent. Denies symptoms at present. Last time he had tingling was 5 days ago.     Response to last session:   no problems  Performing HEP: yes, 1x day     Pain/tingling  0/10 at the start of session    Treatment:  Therapeutic Exercises - to increase ROM, improve postural " "alignment and resolve tingling.    Seated   Cervical retraction 10 reps 5\" holds   Cervical retraction with self overpressure 5\" hold 2 x 10 reps  Cervical rotation R  5\" x 10  Cervical rotation  R with self overpressure 5\" hold  x 10   Cervical rotation L 5\" x 10  Cervical rotation L with self overpressure 5\" x 10 reps      Supine  Supine with foam 6\" bolster  placed vertically allowing for 2 minutes of stretch.  Replaced foam bolster with smaller softer bolster and performed the following:  -B shoulder horizontal ABD with green theraband with palms up/palms down 2 x 15 each  -B shoulder ER with green theraband  2 x 15 reps  -R/L UE D2 flexion with green theraband 2 x 15 each  -B shoulder flexion with PVC  wand 3\" hold x 30  reps    Standing  Overhead SB rolls 5\" hold x 10          Education:  Continue with current HEP  Access Code: [email or web address]  URL: [email or web address]         Goals  Active       PT Problem       Pain/tingling       Start:  12/13/24    Expected End:  02/11/25       Patient will report reduction of pain/tingling by  80%  to increase tolerance for prolonged sitting and lying down.         ROM       Start:  12/13/24    Expected End:  02/11/25       Patient will increase Cervical AROM  and B shoulder AROM by 15-20 deg where needed without increase in pain  to ease performance of ADLs, leisure activities and turning head R/L while driving.           Strength       Start:  12/13/24    Expected End:  02/11/25       Patient will increase bilateral scapular strength by 1/3 MMT  in order to improve posture, functional use of BUE's  and decrease pain to return patient to pain-free PLOF.            Flexibility       Start:  12/13/24    Expected End:  02/11/25       Patient will demonstrate increased B upper quarter and neck  flexibility to Good  in order to decrease pain,tingling,  improve positioning and/or promote improved functional mobility.          HEP       Start:  12/13/24    " Expected End:  02/11/25       Patient will be independent and compliant with safe and recommended  HEP to enhance functional progress and long term management of condition.           Outcome       Start:  12/13/24    Expected End:  02/11/25       Patient will improve outcome measures score on  NDI by 4 pts  to show a clinically significant improvement  in functional mobility.

## 2025-01-22 ENCOUNTER — APPOINTMENT (OUTPATIENT)
Dept: RADIOLOGY | Facility: HOSPITAL | Age: 75
End: 2025-01-22
Payer: MEDICARE

## 2025-01-22 ENCOUNTER — HOSPITAL ENCOUNTER (EMERGENCY)
Facility: HOSPITAL | Age: 75
Discharge: HOME | End: 2025-01-22
Attending: STUDENT IN AN ORGANIZED HEALTH CARE EDUCATION/TRAINING PROGRAM
Payer: MEDICARE

## 2025-01-22 VITALS
HEART RATE: 93 BPM | RESPIRATION RATE: 18 BRPM | DIASTOLIC BLOOD PRESSURE: 104 MMHG | BODY MASS INDEX: 23.19 KG/M2 | WEIGHT: 162 LBS | SYSTOLIC BLOOD PRESSURE: 185 MMHG | TEMPERATURE: 98.1 F | HEIGHT: 70 IN | OXYGEN SATURATION: 94 %

## 2025-01-22 DIAGNOSIS — S76.312A STRAIN OF LEFT HAMSTRING, INITIAL ENCOUNTER: Primary | ICD-10-CM

## 2025-01-22 PROCEDURE — 2500000005 HC RX 250 GENERAL PHARMACY W/O HCPCS: Performed by: STUDENT IN AN ORGANIZED HEALTH CARE EDUCATION/TRAINING PROGRAM

## 2025-01-22 PROCEDURE — 99284 EMERGENCY DEPT VISIT MOD MDM: CPT | Performed by: STUDENT IN AN ORGANIZED HEALTH CARE EDUCATION/TRAINING PROGRAM

## 2025-01-22 PROCEDURE — 96372 THER/PROPH/DIAG INJ SC/IM: CPT | Performed by: STUDENT IN AN ORGANIZED HEALTH CARE EDUCATION/TRAINING PROGRAM

## 2025-01-22 PROCEDURE — 2500000004 HC RX 250 GENERAL PHARMACY W/ HCPCS (ALT 636 FOR OP/ED): Performed by: STUDENT IN AN ORGANIZED HEALTH CARE EDUCATION/TRAINING PROGRAM

## 2025-01-22 PROCEDURE — 2500000001 HC RX 250 WO HCPCS SELF ADMINISTERED DRUGS (ALT 637 FOR MEDICARE OP)

## 2025-01-22 RX ORDER — LIDOCAINE 50 MG/G
1 PATCH TOPICAL DAILY
Qty: 14 PATCH | Refills: 0 | Status: SHIPPED | OUTPATIENT
Start: 2025-01-22 | End: 2025-01-29

## 2025-01-22 RX ORDER — ACETAMINOPHEN 325 MG/1
975 TABLET ORAL ONCE
Status: COMPLETED | OUTPATIENT
Start: 2025-01-22 | End: 2025-01-22

## 2025-01-22 RX ORDER — ORPHENADRINE CITRATE 100 MG/1
100 TABLET, EXTENDED RELEASE ORAL 2 TIMES DAILY PRN
Qty: 14 TABLET | Refills: 0 | Status: SHIPPED | OUTPATIENT
Start: 2025-01-22 | End: 2025-01-29

## 2025-01-22 RX ORDER — KETOROLAC TROMETHAMINE 30 MG/ML
30 INJECTION, SOLUTION INTRAMUSCULAR; INTRAVENOUS ONCE
Status: COMPLETED | OUTPATIENT
Start: 2025-01-22 | End: 2025-01-22

## 2025-01-22 RX ORDER — ACETAMINOPHEN 325 MG/1
975 TABLET ORAL EVERY 6 HOURS PRN
Qty: 84 TABLET | Refills: 0 | Status: SHIPPED | OUTPATIENT
Start: 2025-01-22 | End: 2025-01-29

## 2025-01-22 RX ORDER — ORPHENADRINE CITRATE 30 MG/ML
60 INJECTION INTRAMUSCULAR; INTRAVENOUS ONCE
Status: COMPLETED | OUTPATIENT
Start: 2025-01-22 | End: 2025-01-22

## 2025-01-22 RX ORDER — LIDOCAINE 560 MG/1
1 PATCH PERCUTANEOUS; TOPICAL; TRANSDERMAL ONCE
Status: DISCONTINUED | OUTPATIENT
Start: 2025-01-22 | End: 2025-01-22 | Stop reason: HOSPADM

## 2025-01-22 RX ADMIN — ACETAMINOPHEN 975 MG: 325 TABLET, FILM COATED ORAL at 18:42

## 2025-01-22 RX ADMIN — LIDOCAINE 4% 1 PATCH: 40 PATCH TOPICAL at 19:45

## 2025-01-22 RX ADMIN — KETOROLAC TROMETHAMINE 30 MG: 30 INJECTION, SOLUTION INTRAMUSCULAR at 19:46

## 2025-01-22 RX ADMIN — ORPHENADRINE CITRATE 60 MG: 60 INJECTION INTRAMUSCULAR; INTRAVENOUS at 19:46

## 2025-01-22 ASSESSMENT — COLUMBIA-SUICIDE SEVERITY RATING SCALE - C-SSRS
2. HAVE YOU ACTUALLY HAD ANY THOUGHTS OF KILLING YOURSELF?: NO
1. IN THE PAST MONTH, HAVE YOU WISHED YOU WERE DEAD OR WISHED YOU COULD GO TO SLEEP AND NOT WAKE UP?: NO
6. HAVE YOU EVER DONE ANYTHING, STARTED TO DO ANYTHING, OR PREPARED TO DO ANYTHING TO END YOUR LIFE?: NO

## 2025-01-22 ASSESSMENT — PAIN DESCRIPTION - PAIN TYPE: TYPE: ACUTE PAIN

## 2025-01-22 ASSESSMENT — PAIN SCALES - GENERAL: PAINLEVEL_OUTOF10: 5 - MODERATE PAIN

## 2025-01-22 ASSESSMENT — PAIN - FUNCTIONAL ASSESSMENT: PAIN_FUNCTIONAL_ASSESSMENT: 0-10

## 2025-01-22 ASSESSMENT — PAIN DESCRIPTION - LOCATION: LOCATION: LEG

## 2025-01-22 ASSESSMENT — PAIN DESCRIPTION - ORIENTATION: ORIENTATION: LEFT;UPPER

## 2025-01-22 NOTE — ED PROVIDER NOTES
"History of Present Illness     History provided by: Patient  Limitations to History: None  External Records Reviewed with Brief Summary: Outpatient progress note from PCP which showed no pertinent past medical history.    HPI:  Klever Moran is a 74 y.o. male with a history of hypertension, hyperlipidemia, and cervical spinal stenosis being treated conservatively. Patient presents with acute proximal posterior thigh injury. He was taking off his boot when he heard a \"pop,\" felt pain and a grinding sensation in his hip. Immediately thereafter, the pain worsened. It was not relieved by rest or ice. Subsequently, he found that the pain was worse with weight bearing and active extension of the knee. No numbness or paresthesias, no cyanosis of the LE or ecchymoses.    Physical Exam   Triage vitals:  T 36.7 °C (98.1 °F)  HR 93  BP (!) 185/104  RR 18  O2 94 % None (Room air)    Physical Exam  Constitutional:       General: He is not in acute distress.     Appearance: Normal appearance. He is normal weight. He is not ill-appearing.      Comments: Uncomfortable appearing   HENT:      Head: Normocephalic and atraumatic.      Nose: Nose normal.      Mouth/Throat:      Mouth: Mucous membranes are moist.      Pharynx: Oropharynx is clear.   Eyes:      Extraocular Movements: Extraocular movements intact.      Pupils: Pupils are equal, round, and reactive to light.   Cardiovascular:      Rate and Rhythm: Regular rhythm. Tachycardia present.      Heart sounds: Normal heart sounds.   Pulmonary:      Effort: Pulmonary effort is normal.      Breath sounds: Normal breath sounds.   Musculoskeletal:         General: No swelling or tenderness.      Right lower leg: No edema.      Left lower leg: No edema.      Comments: Pain on extension of knee when lying supine  ROM intact but limited by pain   Skin:     General: Skin is warm and dry.      Coloration: Skin is not pale.      Findings: No bruising, lesion or rash.   Neurological: "      General: No focal deficit present.      Mental Status: He is alert and oriented to person, place, and time.      Cranial Nerves: No cranial nerve deficit.      Sensory: No sensory deficit.      Motor: Weakness (limited by pain) present.      Coordination: Coordination normal.      Gait: Gait abnormal.        Medical Decision Making & ED Course   Medical Decision Makin y.o. male pmhx htn, hld, cervical stenosis being treated by conservative measures. Presents with acute proximal thigh pain without ecchymoses, tenderness to palpation, or diminished sensation/decreased pedal pulses. Suspect soft tissue injury of the proximal muscles of the thigh.   ----  Scoring Tools Utilized: None    Differential diagnoses considered include but are not limited to: Muscle tear, tendinopathy, sprain, pulled hamstring.     Social Determinants of Health which Significantly Impact Care: None identified The following actions were taken to address these social determinants: None    EKG Independent Interpretation: EKG not obtained    Independent Result Review and Interpretation: Relevant laboratory and radiographic results were reviewed and independently interpreted by myself.  As necessary, they are commented on in the ED Course.    Chronic conditions affecting the patient's care: As documented above in [facility]    The patient was discussed with the following consultants/services: None    Care Considerations: As documented above in MDM    ED Course:  Labs - Labs Reviewed - No data to display  Imaging -   No orders to display      Interventions -   Medications   ketorolac (Toradol) injection 30 mg (has no administration in time range)   orphenadrine (Norflex) injection 60 mg (has no administration in time range)   lidocaine 4 % patch 1 patch (has no administration in time range)   acetaminophen (Tylenol) tablet 975 mg (975 mg oral Given 25 372)     Disposition   Discharge with pain management and ortho follow up.    Patient seen  and discussed with ED attending physician.      Srinivasa Caal MD  Resident  01/22/25 1940

## 2025-01-22 NOTE — ED TRIAGE NOTES
Pt to ED for left upper leg pain. Pt stated he went to pull his boot off and felt a tear in the upper left back leg. Felt a vibration and the hip locked up. Happened about 2.5 hours prior to arrival. Pt is able to ambulate independently but slowly. Pt iced the leg, no medication taken.

## 2025-01-23 NOTE — DISCHARGE INSTRUCTIONS
Use ibuprofen (Advil/Motrin) and acetaminophen (Tylenol) as needed for pain.  You may alternate these so you are using something very 3 hours.

## 2025-01-24 ENCOUNTER — APPOINTMENT (OUTPATIENT)
Dept: PHYSICAL THERAPY | Facility: CLINIC | Age: 75
End: 2025-01-24
Payer: MEDICARE

## 2025-01-27 ENCOUNTER — APPOINTMENT (OUTPATIENT)
Dept: PHYSICAL THERAPY | Facility: CLINIC | Age: 75
End: 2025-01-27
Payer: MEDICARE

## 2025-01-30 ENCOUNTER — TREATMENT (OUTPATIENT)
Dept: PHYSICAL THERAPY | Facility: CLINIC | Age: 75
End: 2025-01-30
Payer: MEDICARE

## 2025-01-30 DIAGNOSIS — M54.12 CERVICAL RADICULOPATHY: ICD-10-CM

## 2025-01-30 PROCEDURE — 97530 THERAPEUTIC ACTIVITIES: CPT | Mod: GP

## 2025-01-30 NOTE — PROGRESS NOTES
"Physical Therapy                                                                                  Physical Therapy Treatment /PT Discharge summary      Patient name: Klever Moran \"Jose\"  MRN:   [id number]  Today's Date: 1/30/2025  7      Time Calculation  Start Time: 0930  Stop Time: 1005  Time Calculation (min): 35 min    Assessment:  Patient has been seen for 6 visits for therapeutic exercises and therapeutic activities .  He was made very good progress in PT and all  PT/patient goals achieved.  Patient reports symptoms resolved.  He is indep and compliant with HEP as instructed.  Ready for discharge.        Plan:        Discharge PT.  Patient to continue indep with HEP and follow up with MD as needed.      Current Problems  1. Stiffness of cervical spine          2. Cervical radiculopathy  Follow Up In Physical Therapy       3. Left shoulder pain, unspecified chronicity          4. Tingling pain             General  Reason for Referral: Cervical Radiculopathy M54.12, Left shoulder pain M25.512  Referred By: Dr. Jose Miguel Rosario  Past Medical History Relevant to Rehab: HTN, hearing loss - L cochlear implant , GERD, skin RYAN, spondylolisthesis, left shoulder pain, B knee pain, s/p lumbar laminectomy, OA, lumbar spinal stenosis,Anterior fusion C4 through C7  Degenerative disc height loss C3-4 and C7-T1  General Comment: Visit 6    Insurance: MEDICARE A/B - NO AUTH / [place] VISITS / $94.44 USED 2024 OT / [facility]  Precautions Comment: Cochlear implant L, Anterior fusion C4 through C7  Degenerative disc height loss C3-4 and C7-T1     Subjective:  Patient reported no pain and tingling is just about gone.  States it resolves if he moves his head/neck.   Reports at least 90% improvement in symptoms during prolonged sitting and lying down.   Response to last session:   no problems  Performing HEP: yes     Pain  0/10 at the start and end  of session     Objective:  Posture: good.  Improved head and neck alignment.  Improved " "shoulder and UE alignment         Cervical Spine movement Loss - Nil/Min/Mod/Max limit or degrees  Retraction: no loss, no pain  Flexion: 50 deg, no pain  Extension: 40 deg, no pain  R rot: 60 deg, no pain  L rot: 58 deg, no pain  R SB: 32 deg, no pain  L SB: 32 deg, no pain     Repeated Movement Testing -  During/after/mechanical response:  Sx in sittin/10 at start  Ret: x 10 reps - no pain   Ret/ext:  x 10 reps - no pain  L rot : x 10 reps - no pain  R rot: x 10 reps - no pain     Shoulder ROM:  Flexion R 160 deg A, no pain,    L 146 deg A, no pain  Abduction R 148 deg A stiffness,   L 110 deg A , no pain  Extension R 60 deg A no pain, L 45 deg no pain  IR R WFL, L decreased due to TSA  ER R WFL, L WFL     Strength:  Shoulder  Left shoulder grossly 4+/5 -5/5 without pain  Right shoulder  grossly 4/5 without pain  Elbow grossly B 5/5  Weakness in scapular stabilizers as evident by posture and movement of shoulders.       Flexibility:  Pectorals B good     Outcome Measures:  Other Measures  Neck Disability Index: score 11, 22% disability      Treatment:  Therapeutic Exercises - to increase ROM, improve postural alignment and resolve tingling.  Standing   B mid rows with green tband 2 x 10  B shoulder extension with green tband 2 x 10  Verbally instructed in hamstring stretches in standing , supine and sitting per patient request.  Written instructions provided for updated HEP, see below.  Green theraband provided.        Education:  Access Code: [email or web address]  URL: [email or web address]  Date: 2025  Prepared by: Ginny Prajapati    Exercises  - Seated Cervical Retraction  - 2 x daily - 7 x weekly - 2 sets - 10 reps - 5\" hold  - Supine Cervical Retraction with Towel  - 2 x daily - 7 x weekly - 2 sets - 10 reps - 5\" hold  - Supine Cervical Rotation AROM on Pillow  - 2 x daily - 7 x weekly - 2 sets - 10 reps  - Supine Shoulder Horizontal Abduction with Resistance  - 1 x daily - 7 x weekly - 2 " "sets - 10 reps  - Supine PNF D2 Flexion with Resistance  - 1 x daily - 7 x weekly - 2 sets - 10 reps  - Supine Shoulder External Rotation with Resistance  - 1 x daily - 7 x weekly - 2 sets - 10 reps  - Standing Shoulder Row with Anchored Resistance  - 1 x daily - 7 x weekly - 2 sets - 10 reps - 5\" hold  - Single Arm Shoulder Extension with Anchored Resistance  - 1 x daily - 7 x weekly - 2 sets - 10 reps - 5:\" hold  - Hooklying Active Hamstring Stretch  - 1 x daily - 7 x weekly - 1 sets - 3 reps - 30\" hold  - Seated Hamstring Stretch  - 1 x daily - 7 x weekly - 1 sets - 3 reps - 30\" hold  - Standing Hamstring Stretch with Step  - 1 x daily - 7 x weekly - 1 sets - 3 reps - 30\" hold    Goals    Resolved       PT Problem       Pain/tingling (Met)       Start:  12/13/24    Expected End:  02/11/25    Resolved:  01/30/25    Patient will report reduction of pain/tingling by  80%  to increase tolerance for prolonged sitting and lying down.         ROM (Met)       Start:  12/13/24    Expected End:  02/11/25    Resolved:  01/30/25    Patient will increase Cervical AROM  and B shoulder AROM by 15-20 deg where needed without increase in pain  to ease performance of ADLs, leisure activities and turning head R/L while driving.           Strength (Met)       Start:  12/13/24    Expected End:  02/11/25    Resolved:  01/30/25    Patient will increase bilateral scapular strength by 1/3 MMT  in order to improve posture, functional use of BUE's  and decrease pain to return patient to pain-free PLOF.            Flexibility (Met)       Start:  12/13/24    Expected End:  02/11/25    Resolved:  01/30/25    Patient will demonstrate increased B upper quarter and neck  flexibility to Good  in order to decrease pain,tingling,  improve positioning and/or promote improved functional mobility.          HEP (Met)       Start:  12/13/24    Expected End:  02/11/25    Resolved:  01/30/25    Patient will be independent and compliant with safe and " recommended  HEP to enhance functional progress and long term management of condition.           Outcome (Met)       Start:  12/13/24    Expected End:  02/11/25    Resolved:  01/30/25    Patient will improve outcome measures score on  NDI by 4 pts  to show a clinically significant improvement  in functional mobility.

## 2025-02-03 ENCOUNTER — APPOINTMENT (OUTPATIENT)
Dept: PHYSICAL THERAPY | Facility: CLINIC | Age: 75
End: 2025-02-03
Payer: MEDICARE

## 2025-02-06 ENCOUNTER — APPOINTMENT (OUTPATIENT)
Dept: PHYSICAL THERAPY | Facility: CLINIC | Age: 75
End: 2025-02-06
Payer: MEDICARE

## 2025-03-26 NOTE — PROGRESS NOTES
"History Of Present Illness:  Klever Moran \"Don\" is a 74 y.o. male with a history of bilateral progressive SNHL presenting for right CI consultation. The patient is s/p left CI placement on 11/11/22.     The patient notes an intermittent echo sound with his left CI. Also reports an overall feeling of \"off balanced\" when he is using a step ladder. He states he does not have difficulty when he holds onto something. Denies difficulty when going up and down the stairs. Endorses a previous spinal surgery which could possibly be related.        Recall 11/22/22:  Mr. Moran is a 73 yo male, with a history of bilateral progressive SNHL. He is now s/p L CI placement on 11/11/22:      Operative findings include:   1. Standard mastoidectomy performed and facial recess opened, round window visualized but poor trajectory for RW insertion   2. Cochleostomy insertion of  implant from [employer], full insertion achieved   3. Appropriate position of cochlear implant electrode confirmed on post-op xray   4. Electrical conduction studies with audiology at end of case confirmed appropriate stimulation of the implant     He returns for first post op viist. He reports pain has improved - still gets occasional sharp pains. Reports left sided ear fullness. He had some imbalance the first 4 days after surgery but he has returned back to his baseline. He wears a hearing aid in his right ear.    Surgical History:  He has a past surgical history that includes Hernia repair (2009); Knee surgery (2009); Hand surgery (07/08/2013); Cochlear implant (11/11/2022); Arthroplasty (Right, 2010); Arthroplasty (Left, 2011); Colonoscopy; Rotator cuff repair (Right); Spinal fusion (2015); Cataract extraction (Bilateral, 2016); Cervical fusion (2021); Knee Arthroplasty (2022); Back surgery (09/03/2015); and Joint replacement (02/24/2022).     Allergies:  Patient has no known allergies.    Medications:   Current Outpatient Medications   Medication " Instructions    cetirizine (ZYRTEC) 10 mg, Daily    fluocinolone (DermOtic) 0.01 % ear drops 5 drops, Each Ear, Daily PRN    meloxicam (MOBIC) 15 mg, oral, Daily    multivitamin-iron-minerals-folic acid (Centrum Silver) 0.4 mg-300 mcg- 250 mcg tablet Centrum Silver Oral Tablet   Refills: 0        Start : 5-Aug-2020   Active    omeprazole (PriLOSEC) 20 mg DR capsule TAKE 1 CAPSULE BY MOUTH EVERY DAY    orphenadrine (NORFLEX) 100 mg, oral, 2 times daily PRN, Do not crush, chew, or split.    rosuvastatin (CRESTOR) 5 mg, oral, Daily      Review of Systems:   A comprehensive 10-point review of systems was obtained including constitutional, neurological, HEENT, pulmonary, cardiovascular, genito-urinary, and other pertinent systems and was negative except as noted in the HPI.      Physical Exam:  Constitutional   General appearance: Healthy-appearing, well-nourished, well groomed, in no acute distress.   Ability to communicate: Normal communication without aids, normal voice quality.       Head and face: Atraumatic with no masses, lesions, or scarring.   Facial strength: Normal strength and symmetry, no synkinesis or facial tic.     Ears  Otoscopic examination:   Left: Magnet site is clean, dry, and intact. Facial nerve function is 1/6.      Nose: Dorsum symmetric with no visible or palpable deformities.     Oral Cavity/Mouth  Lips, teeth, and gums: Normal lips, gums, and dentition.     Oropharynx: Mucosa moist, no lesions.     Neck: Symmetrical, trachea midline. No masses visible.     Neurological/Psychiatric  Cranial Nerve Examination: II - XII grossly intact.  Orientation to person, place, and time: Normal.  Mood and affect: Normal.     Skin: Normal without rashes or lesions.     Pulmonary  Respiratory effort: Chest expands symmetrically.     Cardiovascular: Good peripheral pulses  Peripheral vascular system: No varicosities, carotid pulse normal, no edema. No jugular venous distension.     Extremities: Appearance of  "extremities: Normal. Gait normal.     Last Recorded Vitals:  Height 1.778 m (5' 10\"), weight 74.4 kg (164 lb).    Relevant Results:  I personally reviewed the audiogram from 1/26/25 which demonstrated an AzBio of 20% on the right in quiet, 9% in +10 dB, and 12% in CNC word understanding, right sided moderate to severe SNHL with poor word understanding, and left sided severe to profound SNHL with 20% word understanding.     I personally reviewed the CT internal auditory canals posterior fossa without contrast from 10/18/22 which demonstrated a well aerated mastoid, normal cochlear atanomy, and normal location of anatomy and facial nerve.      Assessment/Plan   74 y.o. male with a history of bilateral progressive SNHL presenting for right CI consultation. Regarding his feeling of imbalance when using the step ladder, the patient will modify his activity levels. We discussed the risks and benefits of right cochlear implant. These risks include damage to the ear bone, damage to the inner ear, hearing loss, dizziness, facial nerve injury, and taste disturbance. The patient opted to undergo this procedure and will be scheduled at their earliest convenience.    - Patient will modify his activities levels for the time being  - Patient will be scheduled for a right CI at his earliest convenience  - Will prescribe a prednisone 60 mg tablets after the procedure to aid in postoperative dizziness and to try to preserve remaining hearing in the right ear    Scribe Attestation  By signing my name below, I, Madelyn Rincon, Scribe   attest that this documentation has been prepared under the direction and in the presence of Mee Rubalcava MD.     I have reviewed the documentation as scribed by Jhony Rincon and agree with the notes.   Mee Rubalcava MD  "

## 2025-04-01 ENCOUNTER — HOSPITAL ENCOUNTER (OUTPATIENT)
Dept: CARDIOLOGY | Facility: CLINIC | Age: 75
Discharge: HOME | End: 2025-04-01
Payer: MEDICARE

## 2025-04-01 ENCOUNTER — APPOINTMENT (OUTPATIENT)
Dept: OTOLARYNGOLOGY | Facility: CLINIC | Age: 75
End: 2025-04-01
Payer: MEDICARE

## 2025-04-01 VITALS — HEIGHT: 70 IN | WEIGHT: 164 LBS | BODY MASS INDEX: 23.48 KG/M2

## 2025-04-01 DIAGNOSIS — H90.3 SENSORINEURAL HEARING LOSS, BILATERAL: ICD-10-CM

## 2025-04-01 DIAGNOSIS — Z01.810 PREOPERATIVE CARDIOVASCULAR EXAMINATION: ICD-10-CM

## 2025-04-01 DIAGNOSIS — Z96.21 COCHLEAR IMPLANT STATUS: Primary | ICD-10-CM

## 2025-04-01 PROCEDURE — 1157F ADVNC CARE PLAN IN RCRD: CPT | Performed by: OTOLARYNGOLOGY

## 2025-04-01 PROCEDURE — 1036F TOBACCO NON-USER: CPT | Performed by: OTOLARYNGOLOGY

## 2025-04-01 PROCEDURE — G2211 COMPLEX E/M VISIT ADD ON: HCPCS | Performed by: OTOLARYNGOLOGY

## 2025-04-01 PROCEDURE — 1159F MED LIST DOCD IN RCRD: CPT | Performed by: OTOLARYNGOLOGY

## 2025-04-01 PROCEDURE — 3008F BODY MASS INDEX DOCD: CPT | Performed by: OTOLARYNGOLOGY

## 2025-04-01 PROCEDURE — 1123F ACP DISCUSS/DSCN MKR DOCD: CPT | Performed by: OTOLARYNGOLOGY

## 2025-04-01 PROCEDURE — 93005 ELECTROCARDIOGRAM TRACING: CPT

## 2025-04-01 PROCEDURE — 99214 OFFICE O/P EST MOD 30 MIN: CPT | Performed by: OTOLARYNGOLOGY

## 2025-04-01 PROCEDURE — 93010 ELECTROCARDIOGRAM REPORT: CPT | Performed by: STUDENT IN AN ORGANIZED HEALTH CARE EDUCATION/TRAINING PROGRAM

## 2025-04-01 PROCEDURE — 1160F RVW MEDS BY RX/DR IN RCRD: CPT | Performed by: OTOLARYNGOLOGY

## 2025-04-01 ASSESSMENT — PATIENT HEALTH QUESTIONNAIRE - PHQ9
1. LITTLE INTEREST OR PLEASURE IN DOING THINGS: NOT AT ALL
2. FEELING DOWN, DEPRESSED OR HOPELESS: NOT AT ALL
SUM OF ALL RESPONSES TO PHQ9 QUESTIONS 1 AND 2: 0

## 2025-04-01 NOTE — LETTER
"April 1, 2025     Leyla Sellers  [facility]  [address]  [address]  [place] [id number]    Patient: Jose Moran   YOB: 1950   Date of Visit: 4/1/2025       Dear Leyla Cross:    Thank you for referring Jose Moran to me for evaluation. Below are my notes for this consultation.  If you have questions, please do not hesitate to call me. I look forward to following your patient along with you.       Sincerely,     Mee Rubalcava MD      CC: Howard Wright MD  ______________________________________________________________________________________    History Of Present Illness:  Klever Moran \"Don\" is a 74 y.o. male with a history of bilateral progressive SNHL presenting for right CI consultation. The patient is s/p left CI placement on 11/11/22.     The patient notes an intermittent echo sound with his left CI. Also reports an overall feeling of \"off balanced\" when he is using a step ladder. He states he does not have difficulty when he holds onto something. Denies difficulty when going up and down the stairs. Endorses a previous spinal surgery which could possibly be related.        Recall 11/22/22:  Mr. Moran is a 73 yo male, with a history of bilateral progressive SNHL. He is now s/p L CI placement on 11/11/22:      Operative findings include:   1. Standard mastoidectomy performed and facial recess opened, round window visualized but poor trajectory for RW insertion   2. Cochleostomy insertion of  implant from [employer], full insertion achieved   3. Appropriate position of cochlear implant electrode confirmed on post-op xray   4. Electrical conduction studies with audiology at end of case confirmed appropriate stimulation of the implant     He returns for first post op viist. He reports pain has improved - still gets occasional sharp pains. Reports left sided ear fullness. He had some imbalance the first 4 days after surgery but he has returned back " to his baseline. He wears a hearing aid in his right ear.    Surgical History:  He has a past surgical history that includes Hernia repair (2009); Knee surgery (2009); Hand surgery (07/08/2013); Cochlear implant (11/11/2022); Arthroplasty (Right, 2010); Arthroplasty (Left, 2011); Colonoscopy; Rotator cuff repair (Right); Spinal fusion (2015); Cataract extraction (Bilateral, 2016); Cervical fusion (2021); Knee Arthroplasty (2022); Back surgery (09/03/2015); and Joint replacement (02/24/2022).     Allergies:  Patient has no known allergies.    Medications:   Current Outpatient Medications   Medication Instructions   • cetirizine (ZYRTEC) 10 mg, Daily   • fluocinolone (DermOtic) 0.01 % ear drops 5 drops, Each Ear, Daily PRN   • meloxicam (MOBIC) 15 mg, oral, Daily   • multivitamin-iron-minerals-folic acid (Centrum Silver) 0.4 mg-300 mcg- 250 mcg tablet Centrum Silver Oral Tablet   Refills: 0        Start : 5-Aug-2020   Active   • omeprazole (PriLOSEC) 20 mg DR capsule TAKE 1 CAPSULE BY MOUTH EVERY DAY   • orphenadrine (NORFLEX) 100 mg, oral, 2 times daily PRN, Do not crush, chew, or split.   • rosuvastatin (CRESTOR) 5 mg, oral, Daily      Review of Systems:   A comprehensive 10-point review of systems was obtained including constitutional, neurological, HEENT, pulmonary, cardiovascular, genito-urinary, and other pertinent systems and was negative except as noted in the HPI.      Physical Exam:  Constitutional   General appearance: Healthy-appearing, well-nourished, well groomed, in no acute distress.   Ability to communicate: Normal communication without aids, normal voice quality.       Head and face: Atraumatic with no masses, lesions, or scarring.   Facial strength: Normal strength and symmetry, no synkinesis or facial tic.     Ears  Otoscopic examination:   Left: Magnet site is clean, dry, and intact. Facial nerve function is 1/6.      Nose: Dorsum symmetric with no visible or palpable deformities.     Oral  "Cavity/Mouth  Lips, teeth, and gums: Normal lips, gums, and dentition.     Oropharynx: Mucosa moist, no lesions.     Neck: Symmetrical, trachea midline. No masses visible.     Neurological/Psychiatric  Cranial Nerve Examination: II - XII grossly intact.  Orientation to person, place, and time: Normal.  Mood and affect: Normal.     Skin: Normal without rashes or lesions.     Pulmonary  Respiratory effort: Chest expands symmetrically.     Cardiovascular: Good peripheral pulses  Peripheral vascular system: No varicosities, carotid pulse normal, no edema. No jugular venous distension.     Extremities: Appearance of extremities: Normal. Gait normal.     Last Recorded Vitals:  Height 1.778 m (5' 10\"), weight 74.4 kg (164 lb).    Relevant Results:  I personally reviewed the audiogram from 1/26/25 which demonstrated an AzBio of 20% on the right in quiet, 9% in +10 dB, and 12% in CNC word understanding, right sided moderate to severe SNHL with poor word understanding, and left sided severe to profound SNHL with 20% word understanding.     I personally reviewed the CT internal auditory canals posterior fossa without contrast from 10/18/22 which demonstrated a well aerated mastoid, normal cochlear atanomy, and normal location of anatomy and facial nerve.      Assessment/Plan  74 y.o. male with a history of bilateral progressive SNHL presenting for right CI consultation. Regarding his feeling of imbalance when using the step ladder, the patient will modify his activity levels. We discussed the risks and benefits of right cochlear implant. These risks include damage to the ear bone, damage to the inner ear, hearing loss, dizziness, facial nerve injury, and taste disturbance. The patient opted to undergo this procedure and will be scheduled at their earliest convenience.    - Patient will modify his activities levels for the time being  - Patient will be scheduled for a right CI at his earliest convenience  - Will prescribe a " prednisone 60 mg tablets after the procedure to aid in postoperative dizziness and to try to preserve remaining hearing in the right ear    Scribe Attestation  By signing my name below, I, Madelyn Rincon Scrsusana   attest that this documentation has been prepared under the direction and in the presence of Mee Rubalcava MD.     I have reviewed the documentation as scribed by Jhony Rincon and agree with the notes.   Mee Rubalcava MD

## 2025-04-02 LAB
ATRIAL RATE: 75 BPM
P AXIS: 59 DEGREES
P OFFSET: 189 MS
P ONSET: 134 MS
PR INTERVAL: 178 MS
Q ONSET: 223 MS
QRS COUNT: 12 BEATS
QRS DURATION: 142 MS
QT INTERVAL: 410 MS
QTC CALCULATION(BAZETT): 457 MS
QTC FREDERICIA: 441 MS
R AXIS: 20 DEGREES
T AXIS: 31 DEGREES
T OFFSET: 428 MS
VENTRICULAR RATE: 75 BPM

## 2025-04-10 ENCOUNTER — EVALUATION (OUTPATIENT)
Dept: SPEECH THERAPY | Facility: CLINIC | Age: 75
End: 2025-04-10
Payer: MEDICARE

## 2025-04-10 DIAGNOSIS — R48.8 OTHER SYMBOLIC DYSFUNCTIONS: ICD-10-CM

## 2025-04-10 DIAGNOSIS — H90.3 SENSORINEURAL HEARING LOSS, BILATERAL: Primary | ICD-10-CM

## 2025-04-10 DIAGNOSIS — R41.841 COGNITIVE COMMUNICATION DEFICIT: ICD-10-CM

## 2025-04-10 PROCEDURE — 92523 SPEECH SOUND LANG COMPREHEN: CPT | Mod: GN

## 2025-04-10 ASSESSMENT — PAIN - FUNCTIONAL ASSESSMENT: PAIN_FUNCTIONAL_ASSESSMENT: 0-10

## 2025-04-10 ASSESSMENT — PAIN SCALES - GENERAL: PAINLEVEL_OUTOF10: 0 - NO PAIN

## 2025-04-10 NOTE — PROGRESS NOTES
"Speech-Language Pathology    Cognitive Evaluation      Patient Name: Klever Moran \"Jose\"  MRN: [id number]  Today's Date: 4/10/2025     Time Calculation  Start Time: 0900  Stop Time: 0945  Time Calculation (min): 45 min    Current Problem:  Patient Active Problem List   Diagnosis    Hearing loss    Sensorineural hearing loss, bilateral    Cochlear implant status    Other symbolic dysfunctions    Cognitive communication deficit         Recommendations and Impressions:  NORMAL OVERALL COGNITIVE ASSESSMENT  Recommendations: Aural Rehab 1-4 weeks S/P Cochlear Implant Activation    Subjective     General Visit Information:  Recommendations: Aural Rehab 1-4 weeks S/P Cochlear Implant Activation  Living Environment: Home  Language at home: Spoken English  Arrival: Independent  Reason for Referral: Cognitive assessment as part of cochlear implant candidacy determination    Provider:  Audiology: Dr. Koch with [facility]  ENT: Dr. Rubalcava    Pain:  Pain Assessment  Pain Assessment: 0-10  0-10 (Numeric) Pain Score: 0 - No pain    Objective   Baseline Observations:  Hearing Loss: Progressive (Over time)  Current Amplification: Worn during evaluation  Left Ear: Cochlear Implant  Date of amplification left: 12/2022  Right Ear: None    Cognition Skill Assessment:  CLQT: Personal Facts Score: 8  CLQT: Personal Facts Function: WFL  CLQT: Symbol Cancellation Score: 12  CLQT: Symbol Cancellation Function: WFL  CLQT: Confrontational Naming Score: 10  CLQT: Confrontational Naming Function: WFL  CLQT: Clock Drawing Score: 13  CLQT: Clock Drawing Function: WFL  CLQT: Story Retelling Score: 7  CLQT: Story Retelling Function: WFL  CLQT: Symbol Trails Score: 10  CLQT: Symbol Trails Function: WFL  CLQT: Generative Naming Score: 6  CLQT: Generative Naming Function: WFL  CLQT: Design Memory Score: 5  CLQT: Design Memory Function: WFL  CLQT: Mazes Score: 8  CLQT: Mazes Function: WFL  CLQT: Design Generation Score: 5  CLQT: Design Generation " Function: WFL  CLQT: Auditory Comprehension Score: 19  CLQT: Auditory Comprehension Function: WFL    SRCD - Severity Rating Cognitive Domains:  SRCD: Attention Score: 199  SRCD: Attention Ratin  SRCD: Attention Function: WFL  SRCD: Memory Score: 154  SRCD: Memory Ratin  SRCD: Memory Function: WFL  SRCD: Executive Functions Score: 29  SRCD: Executive Functions Ratin  SRCD: Executive Functions Function: WFL  SRCD: Language Score: 31  SRCD: Language Ratin  SRCD: Language Function: WFL  SRCD: Visuospatial Skills Score: 93  SRCD: Visuospatial Skills Ratin  SRCD:Visuospatial Skills Function: WFL  SRCD: Non-Linguistic Cognition Score: 40  SRCD: Non-Linguistic Cognition Ratin  SRCD: Non-Linguistic Cognition Function: WFL  SRCD: Linguistic Cognition Score: 50  SRCD: Clock Drawing Severity Rating Score: 13  SRCD: Clock Drawing Severity Rating Rate: 4  SRCD: Clock Drawing Severity Rating Function: WFL  SRCD: Generative Naming Perseveration Ratio Score: .05  SRCD: Generative Naming Perseveration Ratio Function: WFL  SRCD: Composite Severity Score Rating Function: 4  SRCD: Composite Severity Score Rating Function: WFL    Auditory Education:  Treatment Performed Today: No  Individual(s) Educated: Patient  Verbal Education Provided: Risks/Benefits of Therapy, Results of Testing, Aural Rehabilitation s/p cochlear implant activation  Response to Educaton: Verbalized Understanding, Patient/caregiver asked appropriate questions  Patient's Learning Preference(s): Demonstration, Printed Materials, Explanation/Discussion  Patient/caregiver verbalized understanding and agreement.: Yes

## 2025-04-25 DIAGNOSIS — Z41.9 ELECTIVE SURGERY: ICD-10-CM

## 2025-04-25 DIAGNOSIS — H90.3 SENSORINEURAL HEARING LOSS, BILATERAL: ICD-10-CM

## 2025-04-25 DIAGNOSIS — E78.2 MIXED HYPERLIPIDEMIA: ICD-10-CM

## 2025-04-25 ASSESSMENT — ENCOUNTER SYMPTOMS
NECK PAIN: 1
BACK PAIN: 1

## 2025-04-25 NOTE — H&P (VIEW-ONLY)
CPM/PAT Evaluation       Name: Klever Moran   /Age: 1950       TELEMEDICINE ENCOUNTER  Patient was interviewed by telephone for preadmission testing perioperative risk assessment prior to surgery.    DATE OF CONSULT: 2025  REFERRING PROVIDER: Dr. Mee Rubalcava  SURGERY, DATE, AND LENGTH: Right side cochlear implant; 2025; 175 minutes    CHIEF COMPLAINT  Bilateral sensorineural hearing loss    HPI  Klever Moran is a 74-year-old male whose medical history is assisted by his wife, Trish Moran.  Patient with bilateral sensorineural hearing loss.  He has a left cochlear implant, but continues to struggle with hearing on his right side.  He has been referred to preadmission testing in anticipation of a right side cochlear implant.  Patient with medical history significant for hyperlipidemia; GERD; postlaminectomy syndrome with chronic low back pain.  Patient has no other medical complaints at this time, and reports to be in usual state of health without any current or recent illnesses/infections/fevers, or hospitalizations. In the past month, neither the patient nor anyone in the household has had any respiratory illnesses including Covid 19, Influenza; Respiratory Syncytial Virus (RSV), or pneumonia.      ACTIVE PROBLEMS  Problem List[1]     PAST MEDICAL HISTORY  Medical History[2]     SURGICAL HISTORY  Surgical History[3]     ANESTHESIA HISTORY  Patient reports history of PONV.  Denies other problems with anesthesia in the past such as prolonged sedation, awareness, dental damage, aspiration, cardiac arrest, difficult intubation, or unexpected hospital admissions. Denies family history of malignant hyperthermia, or pseudocholinesterase deficiency.    SOCIAL HISTORY  Former cigarette smoker quit in ; no alcohol or recreational drug use.  Patient states he exercises at the gym 3 times a week and is able to do moderate ADLs such as yard work/gardening, and housework.  He denies cardiac  "chest pain or excessive/activity limiting shortness of breath.  METS >4    FAMILY HISTORY  Family History[4]     ALLERGIES  RX Allergies[5]     MEDICATIONS  Current Medications[6]     REVIEW OF SYSTEMS  Review of Systems   HENT:  Positive for hearing loss.    Musculoskeletal:  Positive for back pain and neck pain.   All other systems reviewed and are negative.    STOP BANG:  Negative for MARCIA    PHYSICAL EXAM  Deferred    AIRWAY EXAM  Deferred    VITALS  No vitals taken for telemedicine visit  BMI Readings from Last 1 Encounters:   04/01/25 23.53 kg/m²      BP Readings from Last 4 Encounters:   01/22/25 (!) 185/104   12/12/24 144/80   11/21/24 138/65   08/27/24 160/85        LABS  Lab Results   Component Value Date    WBC 8.5 11/21/2024    HGB 15.1 11/21/2024    HCT 46.8 11/21/2024     (H) 11/21/2024     11/21/2024      Lab Results   Component Value Date    GLUCOSE 85 11/21/2024    CALCIUM 9.1 11/21/2024     (H) 11/21/2024    K 4.4 11/21/2024    CO2 27 11/21/2024     11/21/2024    BUN 22 11/21/2024    CREATININE 0.90 11/21/2024      No results found for: \"HGBA1C\"   Lab Results   Component Value Date    CHOL 176 11/21/2024    CHOL 164 10/24/2023    CHOL 213 (H) 03/14/2023     Lab Results   Component Value Date    HDL 42.9 11/21/2024    HDL 40.3 10/24/2023    HDL 38.8 (A) 03/14/2023     Lab Results   Component Value Date    LDLCALC 115 (H) 11/21/2024    LDLCALC 107 (H) 10/24/2023     Lab Results   Component Value Date    TRIG 89 11/21/2024    TRIG 86 10/24/2023    TRIG 165 (H) 03/14/2023     Lab order for CBC, CMP placed on 04/25/2025.  Patient verbally acknowledged lab work is to be completed before surgery.      Encounter Date: 04/01/25   ECG 12 lead   Result Value    Ventricular Rate 75    Atrial Rate 75    MA Interval 178    QRS Duration 142    QT Interval 410    QTC Calculation(Bazett) 457    P Axis 59    R Axis 20    T Axis 31    QRS Count 12    Q Onset 223    P Onset 134    P Offset 189 "    T Offset 428    QTC Fredericia 441    Narrative    Normal sinus rhythm  Right bundle branch block  Abnormal ECG  When compared with ECG of 24-JAN-2024 09:33,  No significant change was found  Confirmed by Enrique Najera ([id number]) on 4/2/2025 12:59:49 AM          ASSESSMENT/PLAN  Bilateral sensorineural hearing loss  Right side cochlear implant      Preoperative instructions reviewed in detail with patient during this encounter. A copy of these instructions has been unloaded to  [employer] along with a copy sent to either home email address or mailed to home address.  This note was created in part upon personal review of patient's medical records.  Speech recognition transcription software was used in the creation of this note. Despite proofreading, several typographical errors might be present that might affect the meaning of the content.            [1]   Patient Active Problem List  Diagnosis    B12 deficiency    Cervical radiculitis    Cervical stenosis of spine    Chest pain, pleuritic    Pain in joint, lower leg    Combined hyperlipidemia    Degenerative arthritis of lumbar spine    Disc degeneration, lumbar    GERD (gastroesophageal reflux disease)    Hearing loss    Hyperhidrosis    Impaired fasting glucose    Impingement syndrome of left shoulder    Left shoulder pain    Low back pain    Lumbar postlaminectomy syndrome    Lung mass    Myofascial pain syndrome, cervical    Osteoarthritis of wrist    Osteoarthritis, hand    Sensorineural hearing loss, bilateral    Spondylosis of cervical region without myelopathy or radiculopathy    Status post lumbar spinal fusion    Tinnitus of both ears    Weakness    Bilateral chronic knee pain    Cochlear implant status    Health maintenance examination    History of colon polyps    Primary osteoarthritis, left shoulder    Squamous cell carcinoma of skin of other parts of face    Squamous cell carcinoma of skin of scalp and neck    Lumbar stenosis with neurogenic  claudication    Ulceration of intestine    Primary hypertension    Postoperative state    Paresthesia    Impingement syndrome of shoulder region    History of hearing loss    History of metabolic disorder    Acquired spondylolisthesis    Shoulder stiffness, left    Pain and swelling of left shoulder    Retention of urine    Drug-induced constipation    Stiffness of cervical spine    Tingling pain    Other symbolic dysfunctions    Cognitive communication deficit   [2]   Past Medical History:  Diagnosis Date    Arthritis 01 /01/2000    Cancer (Multi) 01/01/2000    Cochlear implant in place     L side / hearing aid worn    Encounter for screening for cardiovascular disorders 10/08/2019    Screening for abdominal aortic aneurysm    Encounter for screening for cardiovascular disorders 02/02/2021    Screening, heart disease, ischemic    GERD (gastroesophageal reflux disease)     Hyperlipidemia     Hypertension     Injury of conjunctiva and corneal abrasion without foreign body, left eye, initial encounter 04/16/2021    Abrasion of left cornea, initial encounter    Occipital neuralgia 09/12/2018    Occipital neuralgia of right side    Other chest pain 04/16/2021    Chest pain, atypical    Other conditions influencing health status     Adverse Reaction To Anesthesia    Other malaise 04/16/2021    Malaise and fatigue    Personal history of other diseases of the musculoskeletal system and connective tissue 04/16/2021    History of neck pain    Personal history of other diseases of the nervous system and sense organs     History of hearing loss    Personal history of other specified conditions 06/05/2017    History of paresthesia    Personal history of other specified conditions 06/24/2020    History of weakness    PONV (postoperative nausea and vomiting)     Post laminectomy syndrome     Radiculopathy, lumbar region 09/10/2020    Lumbar radiculitis    Superficial foreign body of unspecified hand, initial encounter 04/16/2021     Acute foreign body of hand    Unspecified hearing loss, right ear 04/16/2021    Hearing loss of right ear, unspecified hearing loss type   [3]   Past Surgical History:  Procedure Laterality Date    ARTHROPLASTY Right 2010    Right thumb arthroplasty    ARTHROPLASTY Left 2011    Left thumb arthroplasty    BACK SURGERY  09/03/2015    CATARACT EXTRACTION Bilateral 2016    CERVICAL FUSION  2021    C3-C7    COCHLEAR IMPLANT  11/11/2022    COLONOSCOPY      2011, 2018    HAND SURGERY  07/08/2013    Hand Surgery                                                                                                                                                          HERNIA REPAIR  2009    Hernia Repair    JOINT REPLACEMENT  02/24/2022    KNEE ARTHROPLASTY Right 2022    KNEE SURGERY  2009    Knee Surgery Right    ROTATOR CUFF REPAIR Right     2012, 2013    SPINAL FUSION  2015    L4-S1 fusion   [4]   Family History  Problem Relation Name Age of Onset    Arthritis Mother Mom     Cancer Mother Mom     Hearing loss Father Father    [5] No Known Allergies  [6] No current facility-administered medications for this encounter.    Current Outpatient Medications:     cetirizine (ZyrTEC) 10 mg tablet, Take 1 tablet (10 mg) by mouth once daily., Disp: , Rfl:     fluocinolone (DermOtic) 0.01 % ear drops, Administer 5 drops into each ear once daily as needed (itching and flaking)., Disp: 20 mL, Rfl: 1    meloxicam (Mobic) 15 mg tablet, TAKE 1 TABLET BY MOUTH EVERY DAY, Disp: 90 tablet, Rfl: 1    multivitamin-iron-minerals-folic acid (Centrum Silver) 0.4 mg-300 mcg- 250 mcg tablet, Centrum Silver Oral Tablet  Refills: 0      Start : 5-Aug-2020  Active, Disp: , Rfl:     omeprazole (PriLOSEC) 20 mg DR capsule, TAKE 1 CAPSULE BY MOUTH EVERY DAY, Disp: 90 capsule, Rfl: 3    rosuvastatin (Crestor) 5 mg tablet, TAKE 1 TABLET BY MOUTH EVERY DAY, Disp: 90 tablet, Rfl: 3    orphenadrine (Norflex) 100 mg 12 hr tablet, Take 1 tablet (100 mg) by mouth  2 times a day as needed for muscle spasms for up to 7 days. Do not crush, chew, or split., Disp: 14 tablet, Rfl: 0

## 2025-04-25 NOTE — CPM/PAT H&P
CPM/PAT Evaluation       Name: Klever Moran   /Age: 1950       TELEMEDICINE ENCOUNTER  Patient was interviewed by telephone for preadmission testing perioperative risk assessment prior to surgery.    DATE OF CONSULT: 2025  REFERRING PROVIDER: Dr. Mee Rubalcava  SURGERY, DATE, AND LENGTH: Right side cochlear implant; 2025; 175 minutes    CHIEF COMPLAINT  Bilateral sensorineural hearing loss    HPI  Klever Moran is a 74-year-old male whose medical history is assisted by his wife, Trish Moran.  Patient with bilateral sensorineural hearing loss.  He has a left cochlear implant, but continues to struggle with hearing on his right side.  He has been referred to preadmission testing in anticipation of a right side cochlear implant.  Patient with medical history significant for hyperlipidemia; GERD; postlaminectomy syndrome with chronic low back pain.  Patient has no other medical complaints at this time, and reports to be in usual state of health without any current or recent illnesses/infections/fevers, or hospitalizations. In the past month, neither the patient nor anyone in the household has had any respiratory illnesses including Covid 19, Influenza; Respiratory Syncytial Virus (RSV), or pneumonia.      ACTIVE PROBLEMS  Problem List[1]     PAST MEDICAL HISTORY  Medical History[2]     SURGICAL HISTORY  Surgical History[3]     ANESTHESIA HISTORY  Patient reports history of PONV.  Denies other problems with anesthesia in the past such as prolonged sedation, awareness, dental damage, aspiration, cardiac arrest, difficult intubation, or unexpected hospital admissions. Denies family history of malignant hyperthermia, or pseudocholinesterase deficiency.    SOCIAL HISTORY  Former cigarette smoker quit in ; no alcohol or recreational drug use.  Patient states he exercises at the gym 3 times a week and is able to do moderate ADLs such as yard work/gardening, and housework.  He denies cardiac  "chest pain or excessive/activity limiting shortness of breath.  METS >4    FAMILY HISTORY  Family History[4]     ALLERGIES  RX Allergies[5]     MEDICATIONS  Current Medications[6]     REVIEW OF SYSTEMS  Review of Systems   HENT:  Positive for hearing loss.    Musculoskeletal:  Positive for back pain and neck pain.   All other systems reviewed and are negative.    STOP BANG:  Negative for MARCIA    PHYSICAL EXAM  Deferred    AIRWAY EXAM  Deferred    VITALS  No vitals taken for telemedicine visit  BMI Readings from Last 1 Encounters:   04/01/25 23.53 kg/m²      BP Readings from Last 4 Encounters:   01/22/25 (!) 185/104   12/12/24 144/80   11/21/24 138/65   08/27/24 160/85        LABS  Lab Results   Component Value Date    WBC 8.5 11/21/2024    HGB 15.1 11/21/2024    HCT 46.8 11/21/2024     (H) 11/21/2024     11/21/2024      Lab Results   Component Value Date    GLUCOSE 85 11/21/2024    CALCIUM 9.1 11/21/2024     (H) 11/21/2024    K 4.4 11/21/2024    CO2 27 11/21/2024     11/21/2024    BUN 22 11/21/2024    CREATININE 0.90 11/21/2024      No results found for: \"HGBA1C\"   Lab Results   Component Value Date    CHOL 176 11/21/2024    CHOL 164 10/24/2023    CHOL 213 (H) 03/14/2023     Lab Results   Component Value Date    HDL 42.9 11/21/2024    HDL 40.3 10/24/2023    HDL 38.8 (A) 03/14/2023     Lab Results   Component Value Date    LDLCALC 115 (H) 11/21/2024    LDLCALC 107 (H) 10/24/2023     Lab Results   Component Value Date    TRIG 89 11/21/2024    TRIG 86 10/24/2023    TRIG 165 (H) 03/14/2023     Lab order for CBC, CMP placed on 04/25/2025.  Patient verbally acknowledged lab work is to be completed before surgery.      Encounter Date: 04/01/25   ECG 12 lead   Result Value    Ventricular Rate 75    Atrial Rate 75    [place] Interval 178    QRS Duration 142    QT Interval 410    QTC Calculation(Bazett) 457    P Axis 59    R Axis 20    T Axis 31    QRS Count 12    Q Onset 223    P Onset 134    P Offset 189 "    T Offset 428    QTC Fredericia 441    Narrative    Normal sinus rhythm  Right bundle branch block  Abnormal ECG  When compared with ECG of 24-JAN-2024 09:33,  No significant change was found  Confirmed by Enrique Najera ([id number]) on 4/2/2025 12:59:49 AM          ASSESSMENT/PLAN  Bilateral sensorineural hearing loss  Right side cochlear implant      Preoperative instructions reviewed in detail with patient during this encounter. A copy of these instructions has been unloaded to  [employer] along with a copy sent to either home email address or mailed to home address.  This note was created in part upon personal review of patient's medical records.  Speech recognition transcription software was used in the creation of this note. Despite proofreading, several typographical errors might be present that might affect the meaning of the content.            [1]   Patient Active Problem List  Diagnosis    B12 deficiency    Cervical radiculitis    Cervical stenosis of spine    Chest pain, pleuritic    Pain in joint, lower leg    Combined hyperlipidemia    Degenerative arthritis of lumbar spine    Disc degeneration, lumbar    GERD (gastroesophageal reflux disease)    Hearing loss    Hyperhidrosis    Impaired fasting glucose    Impingement syndrome of left shoulder    Left shoulder pain    Low back pain    Lumbar postlaminectomy syndrome    Lung mass    Myofascial pain syndrome, cervical    Osteoarthritis of wrist    Osteoarthritis, hand    Sensorineural hearing loss, bilateral    Spondylosis of cervical region without myelopathy or radiculopathy    Status post lumbar spinal fusion    Tinnitus of both ears    Weakness    Bilateral chronic knee pain    Cochlear implant status    Health maintenance examination    History of colon polyps    Primary osteoarthritis, left shoulder    Squamous cell carcinoma of skin of other parts of face    Squamous cell carcinoma of skin of scalp and neck    Lumbar stenosis with neurogenic  claudication    Ulceration of intestine    Primary hypertension    Postoperative state    Paresthesia    Impingement syndrome of shoulder region    History of hearing loss    History of metabolic disorder    Acquired spondylolisthesis    Shoulder stiffness, left    Pain and swelling of left shoulder    Retention of urine    Drug-induced constipation    Stiffness of cervical spine    Tingling pain    Other symbolic dysfunctions    Cognitive communication deficit   [2]   Past Medical History:  Diagnosis Date    Arthritis 01 /01/2000    Cancer (Multi) 01/01/2000    Cochlear implant in place     L side / hearing aid worn    Encounter for screening for cardiovascular disorders 10/08/2019    Screening for abdominal aortic aneurysm    Encounter for screening for cardiovascular disorders 02/02/2021    Screening, heart disease, ischemic    GERD (gastroesophageal reflux disease)     Hyperlipidemia     Hypertension     Injury of conjunctiva and corneal abrasion without foreign body, left eye, initial encounter 04/16/2021    Abrasion of left cornea, initial encounter    Occipital neuralgia 09/12/2018    Occipital neuralgia of right side    Other chest pain 04/16/2021    Chest pain, atypical    Other conditions influencing health status     Adverse Reaction To Anesthesia    Other malaise 04/16/2021    Malaise and fatigue    Personal history of other diseases of the musculoskeletal system and connective tissue 04/16/2021    History of neck pain    Personal history of other diseases of the nervous system and sense organs     History of hearing loss    Personal history of other specified conditions 06/05/2017    History of paresthesia    Personal history of other specified conditions 06/24/2020    History of weakness    PONV (postoperative nausea and vomiting)     Post laminectomy syndrome     Radiculopathy, lumbar region 09/10/2020    Lumbar radiculitis    Superficial foreign body of unspecified hand, initial encounter 04/16/2021     Acute foreign body of hand    Unspecified hearing loss, right ear 04/16/2021    Hearing loss of right ear, unspecified hearing loss type   [3]   Past Surgical History:  Procedure Laterality Date    ARTHROPLASTY Right 2010    Right thumb arthroplasty    ARTHROPLASTY Left 2011    Left thumb arthroplasty    BACK SURGERY  09/03/2015    CATARACT EXTRACTION Bilateral 2016    CERVICAL FUSION  2021    C3-C7    COCHLEAR IMPLANT  11/11/2022    COLONOSCOPY      2011, 2018    HAND SURGERY  07/08/2013    Hand Surgery                                                                                                                                                          HERNIA REPAIR  2009    Hernia Repair    JOINT REPLACEMENT  02/24/2022    KNEE ARTHROPLASTY Right 2022    KNEE SURGERY  2009    Knee Surgery Right    ROTATOR CUFF REPAIR Right     2012, 2013    SPINAL FUSION  2015    L4-S1 fusion   [4]   Family History  Problem Relation Name Age of Onset    Arthritis Mother Mom     Cancer Mother Mom     Hearing loss Father Father    [5] No Known Allergies  [6] No current facility-administered medications for this encounter.    Current Outpatient Medications:     cetirizine (ZyrTEC) 10 mg tablet, Take 1 tablet (10 mg) by mouth once daily., Disp: , Rfl:     fluocinolone (DermOtic) 0.01 % ear drops, Administer 5 drops into each ear once daily as needed (itching and flaking)., Disp: 20 mL, Rfl: 1    meloxicam (Mobic) 15 mg tablet, TAKE 1 TABLET BY MOUTH EVERY DAY, Disp: 90 tablet, Rfl: 1    multivitamin-iron-minerals-folic acid (Centrum Silver) 0.4 mg-300 mcg- 250 mcg tablet, Centrum Silver Oral Tablet  Refills: 0      Start : 5-Aug-2020  Active, Disp: , Rfl:     omeprazole (PriLOSEC) 20 mg DR capsule, TAKE 1 CAPSULE BY MOUTH EVERY DAY, Disp: 90 capsule, Rfl: 3    rosuvastatin (Crestor) 5 mg tablet, TAKE 1 TABLET BY MOUTH EVERY DAY, Disp: 90 tablet, Rfl: 3    orphenadrine (Norflex) 100 mg 12 hr tablet, Take 1 tablet (100 mg) by mouth  2 times a day as needed for muscle spasms for up to 7 days. Do not crush, chew, or split., Disp: 14 tablet, Rfl: 0

## 2025-04-25 NOTE — PREPROCEDURE INSTRUCTIONS
Pre-Operative Instructions &?Checklist      Your surgery has been scheduled at [facility] at [address] [place], [address], [address], in the [facility]. Parking is to the left of the main entrance.     You will be contacted about the time of your surgery the day before your surgery (if your surgery is on a Monday, you will be called the Friday before surgery). If you are unable to answer the phone, a detailed voicemail message will be left. Make sure that your voicemail box is not full so a message can be left. If you have not received a call by 3:00 pm you may call [phone] between the hours of 3:00 and 4:00 pm. Please be available by phone the night before/day of surgery in case there is a change in the schedule which may require you to arrive earlier/later.     ?     14 DAYS BEFORE SURGERY STOP TAKING WEIGHT LOSS MEDICATIONS      ?7 DAYS BEFORE SURGERY STOP THESE MEDICATIONS:       *Multiple Vitamins containing Vitamin E       * Herbal supplements, Fish Oil, garlic pills, turmeric, CoQ enzyme       *Stop taking aspirin, aspirin-containing products, and NSAID's like Advil, Motrin, Aleve, Ibuprofen, Meloxicam, Celecoxib, and Diclofenac.. Tylenol is okay to take for pain relief.        *If you are currently taking Coumadin/Warfarin, we will have to coordinate that with your PCP &/or the Anticoagulation Clinic.     THE EVENING BEFORE SURGERY:   Do not eat any food after midnight the night before surgery.    You are permitted to have no more than 4 ounces of clear liquid up to 3 hours before your arrival time.   Clear liquids are liquids you see through such as: water, pulp-free juices like apple or white grape juice, coffee and tea without creamer or milk (sugar is okay); clear soda and sports drinks.     DAY OF SURGERY TAKE THESE MEDICATIONS (if it is not listed, do not take it.)    Take: Rosuvastatin; omeprazole  If taking medications in tablet/capsule form take with a  small sip of water.     ON THE MORNING OF SURGERY:       *Shower either the night before your surgery or the morning of your surgery       *Do not use moisturizers, creams, lotions or perfume, or make-up.       *Wear comfortable, loose fitting clothing.        *All jewelry and valuables should be left at home.       *Prosthetic devices such as contact lenses, hearing aids, dentures, eyelash extensions, hairpins and body piercings must be removed before surgery. Bring         containers for eyeglasses/contacts, dentures, or hearing aids with you.     ? Diabetics: Please check fasting blood sugars upon waking up. ?If fasting blood sugars are <80ml/dl, please drink 100ml/3oz. of apple juice no later than 2 hours prior                    to surgery.     ?BRING WITH YOU:        *Photo ID and insurance card       *Current list of medicines and allergies       *Pacemaker/Defibrillator/Heart stent cards       *Copy of your complete Advanced Directive/DHPOA, or proof of guardianship-if applicable     ?SMOKING:       *Quitting smoking can make a huge difference to your health and recovery from surgery. ?       *If you need help with quitting, call 2-800-QUIT-NOW.       ALCOHOL:       *No alcoholic beverages for 48 hours before surgery.     ?AFTER OUTPATIENT SURGERY:       *A responsible adult MUST accompany you at the time of discharge and stay with you for 24 hours after your surgery.       *You may NOT drive yourself home after surgery.       *You may use a taxi or ride sharing service ([employer], [employer]) to return home ONLY if you are accompanied by a friend or family member       *Instructions for resuming your medications will be provided by your surgeon.     CONTACT SURGEON'S OFFICE IF YOU DEVELOP:       *Fever =/>?100.4 F        *New respiratory symptoms (e.g. cough, shortness of breath, respiratory distress, sore throat)       *Recent loss of taste or smell       *Flu like symptoms such as headache, fatigue or  gastrointestinal symptoms       *If you develop any open sores, shingles, burning or painful urination    AND/OR:       *You no longer wish to have the surgery.       *Any other personal circumstances change that may lead to the need to cancel or defer this surgery.       *You were admitted to any hospital within one week of your planned procedure.     ?If you have any questions regarding these preoperative instructions, you may call [phone]. If you have questions regarding you surgical procedure, or post-operative care/recovery please call your surgeon's office.     Link to [facility] Laboratory Services Locations   [email or web address]     Link to [facility]   [email or web address]\

## 2025-04-30 DIAGNOSIS — K21.9 GASTRO-ESOPHAGEAL REFLUX DISEASE WITHOUT ESOPHAGITIS: ICD-10-CM

## 2025-04-30 RX ORDER — OMEPRAZOLE 20 MG/1
20 CAPSULE, DELAYED RELEASE ORAL DAILY
Qty: 90 CAPSULE | Refills: 3 | Status: SHIPPED | OUTPATIENT
Start: 2025-04-30

## 2025-05-05 ENCOUNTER — LAB (OUTPATIENT)
Dept: LAB | Facility: HOSPITAL | Age: 75
End: 2025-05-05
Payer: MEDICARE

## 2025-05-05 DIAGNOSIS — H90.3 SENSORINEURAL HEARING LOSS, BILATERAL: Primary | ICD-10-CM

## 2025-05-05 LAB
ALBUMIN SERPL BCP-MCNC: 4.4 G/DL (ref 3.4–5)
ALP SERPL-CCNC: 61 U/L (ref 33–136)
ALT SERPL W P-5'-P-CCNC: 18 U/L (ref 10–52)
ANION GAP SERPL CALC-SCNC: 12 MMOL/L (ref 10–20)
AST SERPL W P-5'-P-CCNC: 18 U/L (ref 9–39)
BILIRUB SERPL-MCNC: 0.9 MG/DL (ref 0–1.2)
BUN SERPL-MCNC: 19 MG/DL (ref 6–23)
CALCIUM SERPL-MCNC: 9 MG/DL (ref 8.6–10.3)
CHLORIDE SERPL-SCNC: 104 MMOL/L (ref 98–107)
CO2 SERPL-SCNC: 28 MMOL/L (ref 21–32)
CREAT SERPL-MCNC: 0.97 MG/DL (ref 0.5–1.3)
EGFRCR SERPLBLD CKD-EPI 2021: 82 ML/MIN/1.73M*2
ERYTHROCYTE [DISTWIDTH] IN BLOOD BY AUTOMATED COUNT: 12.8 % (ref 11.5–14.5)
GLUCOSE SERPL-MCNC: 97 MG/DL (ref 74–99)
HCT VFR BLD AUTO: 50.5 % (ref 41–52)
HGB BLD-MCNC: 16.8 G/DL (ref 13.5–17.5)
MCH RBC QN AUTO: 33.3 PG (ref 26–34)
MCHC RBC AUTO-ENTMCNC: 33.3 G/DL (ref 32–36)
MCV RBC AUTO: 100 FL (ref 80–100)
NRBC BLD-RTO: 0 /100 WBCS (ref 0–0)
PLATELET # BLD AUTO: 283 X10*3/UL (ref 150–450)
POTASSIUM SERPL-SCNC: 4.1 MMOL/L (ref 3.5–5.3)
PROT SERPL-MCNC: 6.7 G/DL (ref 6.4–8.2)
RBC # BLD AUTO: 5.05 X10*6/UL (ref 4.5–5.9)
SODIUM SERPL-SCNC: 140 MMOL/L (ref 136–145)
WBC # BLD AUTO: 7.7 X10*3/UL (ref 4.4–11.3)

## 2025-05-05 PROCEDURE — 36415 COLL VENOUS BLD VENIPUNCTURE: CPT

## 2025-05-07 ENCOUNTER — ANESTHESIA EVENT (OUTPATIENT)
Dept: OPERATING ROOM | Facility: CLINIC | Age: 75
End: 2025-05-07
Payer: MEDICARE

## 2025-05-09 ENCOUNTER — ANESTHESIA (OUTPATIENT)
Dept: OPERATING ROOM | Facility: CLINIC | Age: 75
End: 2025-05-09
Payer: MEDICARE

## 2025-05-09 ENCOUNTER — HOSPITAL ENCOUNTER (OUTPATIENT)
Facility: CLINIC | Age: 75
Setting detail: OUTPATIENT SURGERY
Discharge: HOME | End: 2025-05-09
Attending: OTOLARYNGOLOGY | Admitting: OTOLARYNGOLOGY
Payer: MEDICARE

## 2025-05-09 VITALS
WEIGHT: 160.94 LBS | SYSTOLIC BLOOD PRESSURE: 140 MMHG | HEIGHT: 70 IN | RESPIRATION RATE: 16 BRPM | OXYGEN SATURATION: 98 % | BODY MASS INDEX: 23.04 KG/M2 | HEART RATE: 73 BPM | DIASTOLIC BLOOD PRESSURE: 75 MMHG | TEMPERATURE: 96.8 F

## 2025-05-09 DIAGNOSIS — Z41.9 ELECTIVE SURGERY: ICD-10-CM

## 2025-05-09 DIAGNOSIS — G89.18 POST-OPERATIVE PAIN: ICD-10-CM

## 2025-05-09 DIAGNOSIS — H90.3 SENSORINEURAL HEARING LOSS, BILATERAL: Primary | ICD-10-CM

## 2025-05-09 DIAGNOSIS — Z96.21 COCHLEAR IMPLANT STATUS: ICD-10-CM

## 2025-05-09 DIAGNOSIS — Z98.890 POSTOPERATIVE STATE: ICD-10-CM

## 2025-05-09 DIAGNOSIS — E78.2 MIXED HYPERLIPIDEMIA: ICD-10-CM

## 2025-05-09 PROBLEM — F41.9 ANXIETY: Status: ACTIVE | Noted: 2025-05-09

## 2025-05-09 PROCEDURE — 3700000001 HC GENERAL ANESTHESIA TIME - INITIAL BASE CHARGE: Performed by: OTOLARYNGOLOGY

## 2025-05-09 PROCEDURE — 3600000004 HC OR TIME - INITIAL BASE CHARGE - PROCEDURE LEVEL FOUR: Performed by: OTOLARYNGOLOGY

## 2025-05-09 PROCEDURE — 7100000010 HC PHASE TWO TIME - EACH INCREMENTAL 1 MINUTE: Performed by: OTOLARYNGOLOGY

## 2025-05-09 PROCEDURE — 7100000002 HC RECOVERY ROOM TIME - EACH INCREMENTAL 1 MINUTE: Performed by: OTOLARYNGOLOGY

## 2025-05-09 PROCEDURE — 2500000004 HC RX 250 GENERAL PHARMACY W/ HCPCS (ALT 636 FOR OP/ED): Performed by: ANESTHESIOLOGIST ASSISTANT

## 2025-05-09 PROCEDURE — 2720000007 HC OR 272 NO HCPCS: Performed by: OTOLARYNGOLOGY

## 2025-05-09 PROCEDURE — 2500000004 HC RX 250 GENERAL PHARMACY W/ HCPCS (ALT 636 FOR OP/ED): Performed by: OTOLARYNGOLOGY

## 2025-05-09 PROCEDURE — 69930 IMPLANT COCHLEAR DEVICE: CPT | Performed by: OTOLARYNGOLOGY

## 2025-05-09 PROCEDURE — 3700000002 HC GENERAL ANESTHESIA TIME - EACH INCREMENTAL 1 MINUTE: Performed by: OTOLARYNGOLOGY

## 2025-05-09 PROCEDURE — 2500000005 HC RX 250 GENERAL PHARMACY W/O HCPCS: Performed by: OTOLARYNGOLOGY

## 2025-05-09 PROCEDURE — 2780000003 HC OR 278 NO HCPCS: Performed by: OTOLARYNGOLOGY

## 2025-05-09 PROCEDURE — 2500000001 HC RX 250 WO HCPCS SELF ADMINISTERED DRUGS (ALT 637 FOR MEDICARE OP): Performed by: ANESTHESIOLOGY

## 2025-05-09 PROCEDURE — 3600000009 HC OR TIME - EACH INCREMENTAL 1 MINUTE - PROCEDURE LEVEL FOUR: Performed by: OTOLARYNGOLOGY

## 2025-05-09 PROCEDURE — 2500000004 HC RX 250 GENERAL PHARMACY W/ HCPCS (ALT 636 FOR OP/ED): Mod: JZ | Performed by: ANESTHESIOLOGY

## 2025-05-09 PROCEDURE — 2500000004 HC RX 250 GENERAL PHARMACY W/ HCPCS (ALT 636 FOR OP/ED): Performed by: ANESTHESIOLOGY

## 2025-05-09 PROCEDURE — L8614 COCHLEAR DEVICE: HCPCS | Performed by: OTOLARYNGOLOGY

## 2025-05-09 PROCEDURE — 2500000002 HC RX 250 W HCPCS SELF ADMINISTERED DRUGS (ALT 637 FOR MEDICARE OP, ALT 636 FOR OP/ED): Performed by: ANESTHESIOLOGY

## 2025-05-09 PROCEDURE — 7100000009 HC PHASE TWO TIME - INITIAL BASE CHARGE: Performed by: OTOLARYNGOLOGY

## 2025-05-09 PROCEDURE — 7100000001 HC RECOVERY ROOM TIME - INITIAL BASE CHARGE: Performed by: OTOLARYNGOLOGY

## 2025-05-09 DEVICE — IMPLANTABLE DEVICE
Type: IMPLANTABLE DEVICE | Site: EAR | Status: FUNCTIONAL
Brand: COCHLEAR™ NUCLEUS® CI632 COCHLEAR IMPLANT WITH SLIM MODIOLAR ELECTRODE

## 2025-05-09 RX ORDER — CEFAZOLIN 1 G/1
INJECTION, POWDER, FOR SOLUTION INTRAVENOUS AS NEEDED
Status: DISCONTINUED | OUTPATIENT
Start: 2025-05-09 | End: 2025-05-09

## 2025-05-09 RX ORDER — ACETAMINOPHEN 325 MG/1
650 TABLET ORAL EVERY 4 HOURS PRN
Status: DISCONTINUED | OUTPATIENT
Start: 2025-05-09 | End: 2025-05-09 | Stop reason: HOSPADM

## 2025-05-09 RX ORDER — IBUPROFEN 800 MG/1
800 TABLET ORAL EVERY 8 HOURS PRN
Qty: 30 TABLET | Refills: 0 | Status: SHIPPED | OUTPATIENT
Start: 2025-05-09 | End: 2025-05-19

## 2025-05-09 RX ORDER — ALBUTEROL SULFATE 0.83 MG/ML
2.5 SOLUTION RESPIRATORY (INHALATION) ONCE AS NEEDED
Status: DISCONTINUED | OUTPATIENT
Start: 2025-05-09 | End: 2025-05-09 | Stop reason: HOSPADM

## 2025-05-09 RX ORDER — OXYCODONE HYDROCHLORIDE 5 MG/1
5 TABLET ORAL EVERY 4 HOURS PRN
Status: DISCONTINUED | OUTPATIENT
Start: 2025-05-09 | End: 2025-05-09 | Stop reason: HOSPADM

## 2025-05-09 RX ORDER — PHENYLEPHRINE HCL IN 0.9% NACL 0.4MG/10ML
SYRINGE (ML) INTRAVENOUS AS NEEDED
Status: DISCONTINUED | OUTPATIENT
Start: 2025-05-09 | End: 2025-05-09

## 2025-05-09 RX ORDER — MIDAZOLAM HYDROCHLORIDE 1 MG/ML
INJECTION, SOLUTION INTRAMUSCULAR; INTRAVENOUS AS NEEDED
Status: DISCONTINUED | OUTPATIENT
Start: 2025-05-09 | End: 2025-05-09

## 2025-05-09 RX ORDER — PROPOFOL 10 MG/ML
INJECTION, EMULSION INTRAVENOUS AS NEEDED
Status: DISCONTINUED | OUTPATIENT
Start: 2025-05-09 | End: 2025-05-09

## 2025-05-09 RX ORDER — MUPIROCIN 20 MG/G
OINTMENT TOPICAL AS NEEDED
Status: DISCONTINUED | OUTPATIENT
Start: 2025-05-09 | End: 2025-05-09 | Stop reason: HOSPADM

## 2025-05-09 RX ORDER — FENTANYL CITRATE 50 UG/ML
25 INJECTION, SOLUTION INTRAMUSCULAR; INTRAVENOUS EVERY 5 MIN PRN
Status: DISCONTINUED | OUTPATIENT
Start: 2025-05-09 | End: 2025-05-09 | Stop reason: HOSPADM

## 2025-05-09 RX ORDER — APREPITANT 40 MG/1
40 CAPSULE ORAL ONCE
Status: COMPLETED | OUTPATIENT
Start: 2025-05-09 | End: 2025-05-09

## 2025-05-09 RX ORDER — ACETAMINOPHEN 500 MG
1000 TABLET ORAL EVERY 8 HOURS PRN
Qty: 60 TABLET | Refills: 0 | Status: SHIPPED | OUTPATIENT
Start: 2025-05-09 | End: 2025-05-19

## 2025-05-09 RX ORDER — SODIUM CHLORIDE, SODIUM LACTATE, POTASSIUM CHLORIDE, CALCIUM CHLORIDE 600; 310; 30; 20 MG/100ML; MG/100ML; MG/100ML; MG/100ML
100 INJECTION, SOLUTION INTRAVENOUS CONTINUOUS
Status: SHIPPED | OUTPATIENT
Start: 2025-05-09 | End: 2025-05-09

## 2025-05-09 RX ORDER — DEXAMETHASONE SODIUM PHOSPHATE 10 MG/ML
INJECTION INTRAMUSCULAR; INTRAVENOUS AS NEEDED
Status: DISCONTINUED | OUTPATIENT
Start: 2025-05-09 | End: 2025-05-09 | Stop reason: HOSPADM

## 2025-05-09 RX ORDER — SCOPOLAMINE 1 MG/3D
1 PATCH, EXTENDED RELEASE TRANSDERMAL
Status: DISCONTINUED | OUTPATIENT
Start: 2025-05-09 | End: 2025-05-09 | Stop reason: HOSPADM

## 2025-05-09 RX ORDER — ONDANSETRON 4 MG/1
4 TABLET, ORALLY DISINTEGRATING ORAL EVERY 8 HOURS PRN
Status: DISCONTINUED | OUTPATIENT
Start: 2025-05-09 | End: 2025-05-09 | Stop reason: HOSPADM

## 2025-05-09 RX ORDER — ONDANSETRON HYDROCHLORIDE 2 MG/ML
INJECTION, SOLUTION INTRAVENOUS AS NEEDED
Status: DISCONTINUED | OUTPATIENT
Start: 2025-05-09 | End: 2025-05-09

## 2025-05-09 RX ORDER — TRAMADOL HYDROCHLORIDE 50 MG/1
50 TABLET ORAL EVERY 8 HOURS PRN
Qty: 10 TABLET | Refills: 0 | Status: SHIPPED | OUTPATIENT
Start: 2025-05-09 | End: 2025-05-12

## 2025-05-09 RX ORDER — METOCLOPRAMIDE HYDROCHLORIDE 5 MG/ML
10 INJECTION INTRAMUSCULAR; INTRAVENOUS ONCE AS NEEDED
Status: DISCONTINUED | OUTPATIENT
Start: 2025-05-09 | End: 2025-05-09 | Stop reason: HOSPADM

## 2025-05-09 RX ORDER — CEPHALEXIN 500 MG/1
500 CAPSULE ORAL 3 TIMES DAILY
Qty: 21 CAPSULE | Refills: 0 | Status: SHIPPED | OUTPATIENT
Start: 2025-05-09 | End: 2025-05-16

## 2025-05-09 RX ORDER — LABETALOL HYDROCHLORIDE 5 MG/ML
5 INJECTION, SOLUTION INTRAVENOUS ONCE AS NEEDED
Status: DISCONTINUED | OUTPATIENT
Start: 2025-05-09 | End: 2025-05-09 | Stop reason: HOSPADM

## 2025-05-09 RX ORDER — LIDOCAINE HYDROCHLORIDE AND EPINEPHRINE 10; 10 UG/ML; MG/ML
INJECTION, SOLUTION INFILTRATION; PERINEURAL AS NEEDED
Status: DISCONTINUED | OUTPATIENT
Start: 2025-05-09 | End: 2025-05-09 | Stop reason: HOSPADM

## 2025-05-09 RX ORDER — FENTANYL CITRATE 50 UG/ML
50 INJECTION, SOLUTION INTRAMUSCULAR; INTRAVENOUS EVERY 5 MIN PRN
Status: DISCONTINUED | OUTPATIENT
Start: 2025-05-09 | End: 2025-05-09 | Stop reason: HOSPADM

## 2025-05-09 RX ORDER — FENTANYL CITRATE 50 UG/ML
INJECTION, SOLUTION INTRAMUSCULAR; INTRAVENOUS AS NEEDED
Status: DISCONTINUED | OUTPATIENT
Start: 2025-05-09 | End: 2025-05-09

## 2025-05-09 RX ORDER — SODIUM CHLORIDE 0.9 G/100ML
INJECTION, SOLUTION IRRIGATION AS NEEDED
Status: DISCONTINUED | OUTPATIENT
Start: 2025-05-09 | End: 2025-05-09 | Stop reason: HOSPADM

## 2025-05-09 RX ORDER — LIDOCAINE HYDROCHLORIDE 10 MG/ML
0.1 INJECTION, SOLUTION EPIDURAL; INFILTRATION; INTRACAUDAL; PERINEURAL ONCE
Status: DISCONTINUED | OUTPATIENT
Start: 2025-05-09 | End: 2025-05-09 | Stop reason: HOSPADM

## 2025-05-09 RX ORDER — LIDOCAINE HYDROCHLORIDE 20 MG/ML
INJECTION, SOLUTION INFILTRATION; PERINEURAL AS NEEDED
Status: DISCONTINUED | OUTPATIENT
Start: 2025-05-09 | End: 2025-05-09

## 2025-05-09 RX ORDER — ONDANSETRON HYDROCHLORIDE 2 MG/ML
4 INJECTION, SOLUTION INTRAVENOUS ONCE AS NEEDED
Status: DISCONTINUED | OUTPATIENT
Start: 2025-05-09 | End: 2025-05-09 | Stop reason: HOSPADM

## 2025-05-09 RX ADMIN — SODIUM CHLORIDE, POTASSIUM CHLORIDE, SODIUM LACTATE AND CALCIUM CHLORIDE: 600; 310; 30; 20 INJECTION, SOLUTION INTRAVENOUS at 09:23

## 2025-05-09 RX ADMIN — SCOPOLAMINE 1 PATCH: 1.5 PATCH, EXTENDED RELEASE TRANSDERMAL at 07:15

## 2025-05-09 RX ADMIN — Medication 80 MCG: at 09:00

## 2025-05-09 RX ADMIN — Medication 120 MCG: at 09:09

## 2025-05-09 RX ADMIN — ONDANSETRON 4 MG: 2 INJECTION INTRAMUSCULAR; INTRAVENOUS at 09:14

## 2025-05-09 RX ADMIN — FENTANYL CITRATE 25 MCG: 50 INJECTION, SOLUTION INTRAMUSCULAR; INTRAVENOUS at 08:01

## 2025-05-09 RX ADMIN — APREPITANT 40 MG: 40 CAPSULE ORAL at 07:14

## 2025-05-09 RX ADMIN — SODIUM CHLORIDE, POTASSIUM CHLORIDE, SODIUM LACTATE AND CALCIUM CHLORIDE 500 ML: 600; 310; 30; 20 INJECTION, SOLUTION INTRAVENOUS at 12:50

## 2025-05-09 RX ADMIN — FENTANYL CITRATE 50 MCG: 50 INJECTION, SOLUTION INTRAMUSCULAR; INTRAVENOUS at 07:39

## 2025-05-09 RX ADMIN — PROPOFOL 150 MG: 10 INJECTION, EMULSION INTRAVENOUS at 07:39

## 2025-05-09 RX ADMIN — SODIUM CHLORIDE, POTASSIUM CHLORIDE, SODIUM LACTATE AND CALCIUM CHLORIDE 100 ML/HR: 600; 310; 30; 20 INJECTION, SOLUTION INTRAVENOUS at 07:14

## 2025-05-09 RX ADMIN — MIDAZOLAM 2 MG: 1 INJECTION INTRAMUSCULAR; INTRAVENOUS at 07:29

## 2025-05-09 RX ADMIN — PROPOFOL 50 MCG/KG/MIN: 10 INJECTION, EMULSION INTRAVENOUS at 07:48

## 2025-05-09 RX ADMIN — Medication 80 MCG: at 08:22

## 2025-05-09 RX ADMIN — Medication 80 MCG: at 08:57

## 2025-05-09 RX ADMIN — LIDOCAINE HYDROCHLORIDE 60 MG: 20 INJECTION, SOLUTION INFILTRATION; PERINEURAL at 07:39

## 2025-05-09 RX ADMIN — OXYCODONE HYDROCHLORIDE 5 MG: 5 TABLET ORAL at 10:33

## 2025-05-09 RX ADMIN — DEXAMETHASONE SODIUM PHOSPHATE 10 MG: 4 INJECTION, SOLUTION INTRAMUSCULAR; INTRAVENOUS at 07:39

## 2025-05-09 RX ADMIN — ONDANSETRON 4 MG: 4 TABLET, ORALLY DISINTEGRATING ORAL at 11:42

## 2025-05-09 RX ADMIN — CEFAZOLIN 2 G: 1 INJECTION, POWDER, FOR SOLUTION INTRAMUSCULAR; INTRAVENOUS at 07:48

## 2025-05-09 RX ADMIN — SODIUM CHLORIDE, POTASSIUM CHLORIDE, SODIUM LACTATE AND CALCIUM CHLORIDE: 600; 310; 30; 20 INJECTION, SOLUTION INTRAVENOUS at 07:25

## 2025-05-09 RX ADMIN — FENTANYL CITRATE 25 MCG: 50 INJECTION, SOLUTION INTRAMUSCULAR; INTRAVENOUS at 08:26

## 2025-05-09 RX ADMIN — PROMETHAZINE HYDROCHLORIDE 12.5 MG: 25 INJECTION INTRAMUSCULAR; INTRAVENOUS at 12:30

## 2025-05-09 RX ADMIN — Medication 120 MCG: at 08:38

## 2025-05-09 RX ADMIN — Medication 120 MCG: at 08:04

## 2025-05-09 ASSESSMENT — PAIN - FUNCTIONAL ASSESSMENT
PAIN_FUNCTIONAL_ASSESSMENT: UNABLE TO SELF-REPORT
PAIN_FUNCTIONAL_ASSESSMENT: 0-10
PAIN_FUNCTIONAL_ASSESSMENT: UNABLE TO SELF-REPORT

## 2025-05-09 ASSESSMENT — PAIN SCALES - GENERAL
PAINLEVEL_OUTOF10: 1
PAINLEVEL_OUTOF10: 4
PAINLEVEL_OUTOF10: 1
PAINLEVEL_OUTOF10: 1
PAINLEVEL_OUTOF10: 2
PAINLEVEL_OUTOF10: 1
PAINLEVEL_OUTOF10: 2
PAINLEVEL_OUTOF10: 2
PAINLEVEL_OUTOF10: 1
PAINLEVEL_OUTOF10: 1
PAINLEVEL_OUTOF10: 4

## 2025-05-09 ASSESSMENT — PAIN DESCRIPTION - ORIENTATION: ORIENTATION: RIGHT

## 2025-05-09 ASSESSMENT — PAIN DESCRIPTION - LOCATION: LOCATION: EAR

## 2025-05-09 NOTE — OP NOTE
OPERATIVE NOTE     Date:  2025 OR Location: [facility] OR    Name: Klever Moran : 1950, Age: 74 y.o., MRN: [id number], Sex: male      Surgeons      Mee Rubalcava MD    Resident/Fellow/Other Assistant:  Nik Gay DO    Anesthesia: General  ASA: III  Anesthesia Staff: Anesthesiologist: Pat Pesron DO  C-AA: DERICK Shelton  Staff: Circulator: Noa العراقي RN  Scrub Person: Rosanne Crowe; Carina Bullard         Preoperative Diagnosis:  1. Sensorineural Hearing Loss   - Bilateral.    Postoperative Diagnosis:  1. Sensorineural Hearing Loss   - Bilateral.      Procedure Performed:  1.  Cochlear Implantation (57357)   - Right  2. Needle electromyography; cranial nerve supplied muscle(s), unilateral   - Facial nerve.   - Right  3. Microsurgical techniques, requiring use of operating microscope   - Right      Indications:  Klever Moran is a very pleasant 74 y.o. male who presents with Bilateral severe-to-profound, sensorineural hearing loss and poor discrimination. The patient meets criteria for FDA insertion of cochlear implant. The risks, indications, and complications of surgery were discussed including, but not limited to facial nerve injury, deafness in the operated ear, vertigo, dizziness, imbalance, facial weakness or paralysis, change in sense of taste, perforation of eardrum, pain, bleeding, infection, scarring, need for further surgery, device failure, device extrusion, spinal fluid leak, meningitis, brain damage, brain abscess, stroke, and death. Informed consent was obtained. We also confirmed the patient received the Pneumococcus vaccine prior to surgery. Because of the extensive nature of the dissection and the close proximity of the facial nerve, facial nerve monitoring was used throughout the case.       Operative Findings:  1. Well aerated mastoid.  2.  stimulator position: under temporalis muscle.  2. Facial nerve intact in normal position.  3. Chorda  tympani: intact.   4. Approach: Extended round window  5. Implant/ electrode: Cochlear  placed.  6. Favorable anatomy.   7. Insertion:   - Very smooth full insertion.  - Speed:  60-90sec.  - Marker: at RW.   - No resistance.  8. Neural response telemetry: good response.  9. Intraop testing:   - X-ray: Not Performed.   - SmartNav position check: Normal.   - ECOG: Not performed.    - eSRT: Performed.      Operative Technique:   After informed consent was obtained, the patient was taken to the operating room and placed on the operating room table in the supine position. Anesthesia was induced by the anesthesia team without difficulty. Facial nerve monitoring electrodes were placed in the orbicularis oris muscle and orbicularis oculi muscle and the monitor was activated. Lidocaine with epinephrine was injected in the postauricular area of the incision and the patient was then prepped and draped in standard sterile fashion.    The postauricular incision was made down through the skin and soft tissue to the temporalis muscle and the ear was reflected forward in its avascular plane. Bovie cautery was then used to make a cut through musculofascial layers along the linea temporalis and this then was dissected down to the mastoid tip. Lempert periosteal elevator was used to elevate the soft tissues anteriorly, superiorly, and posteriorly, thus exposing the mastoid and the opening of the external auditory canal. We then used the Lempert elevator to elevate the periosteum off of the skull anteriorly for the ground electrode. The Lempert was then used posterior and superior to the mastoid to develop a tight pocket. Muscle from the temporalis muscle was then harvested for use later in the procedure. Dura hooks where used to hold our view.    A standard mastoidectomy was then completed, carefully identifying the tegmen, sinodural angle and posterior canal wall. We identify the lateral semicircular canal and short process of  the incus. Using 3-hoa mackenzie, and progressing down to a 2-hoa mackenzie we opened the facial recess. We carefully identified the chorda tympani, the incus buttress, and the facial nerve. The facial recess was opened exposing the round window and the rest of the middle ear. The mucosa over the round window niche was elevated using the right angle. The round window niche was identified and its lip was drilled to have a 360-degree view of it. Decadron was then injected into the middle ear.    A 2 mm cutting drill was used to created holes at the lateral edge of the mastoidectomy to secure the internal  in place. The ear was copiously irrigated with saline.  The cochlear implant was then brought onto the operative field and placed in our subperiosteal pocket.     Copious irrigation was then performed and the middle ear was again filled with Decadron. At this point, we performed the opening of the round window with a small right angle. Using microsuction and the jeweler's forceps, we inserted the cochlear implant electrode without resistance using off-sheath technique. The electrode was advanced slowly passed the marker on the array, and a pull-back technique was used. The insertion was very smooth. The insertion site was then packed with muscle, leaving the electrode lead inferiorly in the facial recess. The electrode was then carefully coiled back into the mastoid and a piece of Gelfoam was placed over the mastoid to protect the electrodes during closure. The middle ear was further injected with Decadron.    The wound was then closed in 3 layers using a 3-0 Vicryl for the muscle, 4-0 Vicryl for the subcuticular stitches and 5-0 fast gut suture for the dermis. During this time, the audiologist was here to induct neural response telemetry of the cochlear implant and got good responses. eSRT and SmartNav were performed. At the conclusion of this procedure the incision was covered with a sterile dressing and the  draping was removed. A Diana dressing was applied to the patient's ear. The patient's care was returned to anesthesia for awakening.    This completed the procedure. The patient was then emerged from anesthesia and extubated without difficulty. The patient was then transported back to PACU. There were no apparent complications. Following the procedure, the findings were discussed with the patient's family and all of their questions were answered.    Attending Attestation: I was present and scrubbed for the entire procedure.      Implants:   Implant Name Type Inv. Item Serial No.  Lot No. LRB No. Used Action   COCHLEAR, NUCLEUS , PROFILE PLUS W/SLIM MODIOLAR ELECTRODE - [id number] - [id number] ENT Implant COCHLEAR, NUCLEUS , PROFILE PLUS W/SLIM MODIOLAR ELECTRODE [id number] [employer] [id number] Right 1 Implanted     Specimens: No specimens collected  Estimated Blood Loss: None  Complications: none  Condition of the patient: Stable  Disposition: PACU    ____________________________________________________  Mee Rubalcava MD    Otology/Neurotology/Lateral Skull-Base Surgery   [facility]  Phone: [phone]  Fax: [phone]

## 2025-05-09 NOTE — ANESTHESIA PREPROCEDURE EVALUATION
"Patient: Klever Moran \"Don\"    Procedure Information       Date/Time: 05/09/25 0715    Procedure: RIGHT SIDE COCHLEAR IMPLANT NUCLEUS 632/622 AS BACK UP SINGLE PROCESSOR (Right: Ear)    [place] [facility] [place] [facility]    Surgeons: Mee Rubalcava MD            Relevant Problems   Anesthesia   (+) PONV (postoperative nausea and vomiting)      Cardiac   (+) Chest pain, pleuritic   (+) Combined hyperlipidemia   (+) Primary hypertension      Pulmonary (within normal limits)      Neuro   (+) Anxiety   (+) Cervical radiculitis      GI   (+) GERD (gastroesophageal reflux disease) (Well controlled)      Musculoskeletal   (+) Cervical stenosis of spine   (+) Disc degeneration, lumbar   (+) Lumbar stenosis with neurogenic claudication   (+) Myofascial pain syndrome, cervical   (+) Osteoarthritis of wrist   (+) Osteoarthritis, hand   (+) Primary osteoarthritis, left shoulder   (+) Spondylosis of cervical region without myelopathy or radiculopathy      HEENT   (+) Hearing loss   (+) Sensorineural hearing loss, bilateral      Skin   (+) Squamous cell carcinoma of skin of other parts of face       Clinical information reviewed:   Tobacco  Allergies  Meds  Problems  Med Hx  Surg Hx   Fam Hx  Soc   Hx        NPO Detail:  NPO/Void Status  Date of Last Liquid: 05/09/25  Time of Last Liquid: 0500  Date of Last Solid: 05/08/25  Time of Last Solid: 2030         Physical Exam    Airway  Mallampati: III  TM distance: >3 FB  Neck ROM: full  Mouth opening: 3 or more finger widths     Cardiovascular    Dental - normal exam     Pulmonary    Abdominal            Anesthesia Plan    History of general anesthesia?: yes  History of complications of general anesthesia?: no    ASA 3     general   (Emend and scopolamine)  intravenous induction   Anesthetic plan and risks discussed with patient and spouse.    Plan discussed with CAA.      "

## 2025-05-09 NOTE — ANESTHESIA PROCEDURE NOTES
Airway  Date/Time: 5/9/2025 7:40 AM  Reason: elective    Airway not difficult    Staffing  Performed: CAA   Authorized by: Pat Person DO    Performed by: DERICK Shelton  Patient location during procedure: OR    Patient Condition  Indications for airway management: anesthesia and airway protection  Patient position: sniffing  Planned trial extubation  Sedation level: deep     Final Airway Details   Preoxygenated: yes  Final airway type: supraglottic airway  Successful airway: classic  Size: 4   Ventilation between attempts: none  Number of attempts at approach: 1  Number of other approaches attempted: 0

## 2025-05-09 NOTE — PROCEDURES
Patient Name Klever Moran  Date of birth 1950  Date 5/9/2025  Surgeon Mee Rubalcava MD  Audiologist  Leyla Raphael, CCC-A    Implanted Ear RIGHT   Coch[employer]ar [employer]  Implant   Serial Number [id number]      Electrode insertion achieved: Full. Placement check within normal limits    The following electrodes were noted to be outside the cochlea N/A    Impedances:  Impedances were measured on electrodes electrodes 1-22. Impedance values were within normal limits on electrodes 1-22 .     aNRT:  Positive neural response on electrodes 1 through 22    ESRT: ESRTs were obtained using a pulse width of 37 and a stimulation rate of 900 Hz.  Responses were obtained at electrodes 1 (215), 6 (205), 12 (205), 17 (195) and 22 (215)    ECoG: Intracochlear electrocochleography was not clinically indicated      Equipment and Forms:   The patient's equipment backpack was provided to the family. They were counseled to bring the backpack to activation. Post-operative course was reviewed with the family. The patient is instructed to stop using their hearing aid on the newly implanted ear. They are encouraged to continue using their hearing aid on the non-implanted ear.     The family was provided with the internal implant guide, MRI compatibility booklet, and implant identification card specific to the patient's implant. The patient should place this in their wallet. The family was counseled that the internal implant was registered; indicating the start of the 10 year 's warranty. The external equipment, located in the backpack provided, will be registered at the activation; indicating the start of the 5 year 's warranty on the processor.     Post operative questions should be guided to the Patient Navigator Cochlear Implant Program, Lula Shea RN at [phone].    This patient was referred to [facility] for cochlear implantation surgery via a CPN clinic. Their  activation and follow up programming will be completed at their referring clinic. The patient should contact their audiologist to scheduled activation appointments.         Intraoperative testing was completed by Robby Mayer, DARRELL-A

## 2025-05-09 NOTE — ANESTHESIA POSTPROCEDURE EVALUATION
"Patient: Klever Moran \"Don\"    Procedure Summary       Date: 05/09/25 Room / [place] [facility] [place] [facility]    Anesthesia Start: 0725 Anesthesia Stop: 0940    Procedure: RIGHT SIDE COCHLEAR IMPLANT NUCLEUS 632/622 AS BACK UP SINGLE PROCESSOR (Right: Ear) Diagnosis:       Sensorineural hearing loss, bilateral      (Sensorineural hearing loss, bilateral [H90.3])    Surgeons: Mee Rubalcava MD Responsible Provider: Pat Person DO    Anesthesia Type: general ASA Status: 3            Anesthesia Type: general    Vitals Value Taken Time   /65 05/09/25 10:19   Temp 36 °C (96.8 °F) 05/09/25 10:19   Pulse 73 05/09/25 10:19   Resp 16 05/09/25 10:19   SpO2 94 % 05/09/25 10:19       Anesthesia Post Evaluation    Patient location during evaluation: PACU  Patient participation: complete - patient participated  Level of consciousness: awake  Pain management: satisfactory to patient  Multimodal analgesia pain management approach  Airway patency: patent  Cardiovascular status: acceptable  Respiratory status: acceptable  Hydration status: acceptable  Postoperative Nausea and Vomiting: none        No notable events documented.    "

## 2025-05-13 ENCOUNTER — TELEPHONE (OUTPATIENT)
Dept: OTOLARYNGOLOGY | Facility: CLINIC | Age: 75
End: 2025-05-13
Payer: MEDICARE

## 2025-05-15 ENCOUNTER — TELEPHONE (OUTPATIENT)
Dept: OTOLARYNGOLOGY | Facility: CLINIC | Age: 75
End: 2025-05-15
Payer: MEDICARE

## 2025-05-15 NOTE — TELEPHONE ENCOUNTER
Patient called regarding swelling of implant site. I did advised to have patient send Guang Lian Shi Dait message with picture of site to Dr. Rubalcava when able. He is able to use cold/hot compresses with a barrier (such as washcloth) over the area if swollen. Instructed patient to call back if he has additional questions or concerns.

## 2025-05-16 DIAGNOSIS — M25.569 PAIN IN UNSPECIFIED KNEE: ICD-10-CM

## 2025-05-16 RX ORDER — MELOXICAM 15 MG/1
15 TABLET ORAL DAILY
Qty: 90 TABLET | Refills: 1 | Status: SHIPPED | OUTPATIENT
Start: 2025-05-16

## 2025-05-20 ENCOUNTER — OFFICE VISIT (OUTPATIENT)
Dept: ORTHOPEDIC SURGERY | Facility: CLINIC | Age: 75
End: 2025-05-20
Payer: MEDICARE

## 2025-05-20 DIAGNOSIS — M19.012 OSTEOARTHRITIS OF LEFT SHOULDER, UNSPECIFIED OSTEOARTHRITIS TYPE: Primary | ICD-10-CM

## 2025-05-20 PROCEDURE — 99213 OFFICE O/P EST LOW 20 MIN: CPT | Performed by: ORTHOPAEDIC SURGERY

## 2025-05-20 PROCEDURE — 1160F RVW MEDS BY RX/DR IN RCRD: CPT | Performed by: ORTHOPAEDIC SURGERY

## 2025-05-20 PROCEDURE — 1159F MED LIST DOCD IN RCRD: CPT | Performed by: ORTHOPAEDIC SURGERY

## 2025-05-20 PROCEDURE — 1036F TOBACCO NON-USER: CPT | Performed by: ORTHOPAEDIC SURGERY

## 2025-05-20 ASSESSMENT — COLUMBIA-SUICIDE SEVERITY RATING SCALE - C-SSRS
1. IN THE PAST MONTH, HAVE YOU WISHED YOU WERE DEAD OR WISHED YOU COULD GO TO SLEEP AND NOT WAKE UP?: NO
6. HAVE YOU EVER DONE ANYTHING, STARTED TO DO ANYTHING, OR PREPARED TO DO ANYTHING TO END YOUR LIFE?: NO
2. HAVE YOU ACTUALLY HAD ANY THOUGHTS OF KILLING YOURSELF?: NO

## 2025-05-20 ASSESSMENT — PAIN SCALES - GENERAL: PAINLEVEL_OUTOF10: 5 - MODERATE PAIN

## 2025-05-20 ASSESSMENT — PAIN - FUNCTIONAL ASSESSMENT: PAIN_FUNCTIONAL_ASSESSMENT: 0-10

## 2025-05-22 ENCOUNTER — APPOINTMENT (OUTPATIENT)
Dept: PRIMARY CARE | Facility: CLINIC | Age: 75
End: 2025-05-22
Payer: MEDICARE

## 2025-05-22 VITALS
DIASTOLIC BLOOD PRESSURE: 70 MMHG | TEMPERATURE: 98 F | HEART RATE: 79 BPM | HEIGHT: 70 IN | WEIGHT: 160.4 LBS | BODY MASS INDEX: 22.96 KG/M2 | OXYGEN SATURATION: 95 % | SYSTOLIC BLOOD PRESSURE: 136 MMHG

## 2025-05-22 DIAGNOSIS — E78.2 COMBINED HYPERLIPIDEMIA: ICD-10-CM

## 2025-05-22 DIAGNOSIS — Z00.00 ROUTINE GENERAL MEDICAL EXAMINATION AT HEALTH CARE FACILITY: Primary | ICD-10-CM

## 2025-05-22 DIAGNOSIS — Z96.21 COCHLEAR IMPLANT STATUS: ICD-10-CM

## 2025-05-22 DIAGNOSIS — I10 PRIMARY HYPERTENSION: ICD-10-CM

## 2025-05-22 PROCEDURE — 1160F RVW MEDS BY RX/DR IN RCRD: CPT | Performed by: FAMILY MEDICINE

## 2025-05-22 PROCEDURE — 1036F TOBACCO NON-USER: CPT | Performed by: FAMILY MEDICINE

## 2025-05-22 PROCEDURE — G0439 PPPS, SUBSEQ VISIT: HCPCS | Performed by: FAMILY MEDICINE

## 2025-05-22 PROCEDURE — 1159F MED LIST DOCD IN RCRD: CPT | Performed by: FAMILY MEDICINE

## 2025-05-22 PROCEDURE — 99214 OFFICE O/P EST MOD 30 MIN: CPT | Performed by: FAMILY MEDICINE

## 2025-05-22 PROCEDURE — 3008F BODY MASS INDEX DOCD: CPT | Performed by: FAMILY MEDICINE

## 2025-05-22 PROCEDURE — 3078F DIAST BP <80 MM HG: CPT | Performed by: FAMILY MEDICINE

## 2025-05-22 PROCEDURE — 1170F FXNL STATUS ASSESSED: CPT | Performed by: FAMILY MEDICINE

## 2025-05-22 PROCEDURE — 3075F SYST BP GE 130 - 139MM HG: CPT | Performed by: FAMILY MEDICINE

## 2025-05-22 ASSESSMENT — ENCOUNTER SYMPTOMS
FATIGUE: 0
UNEXPECTED WEIGHT CHANGE: 0
OCCASIONAL FEELINGS OF UNSTEADINESS: 1
WHEEZING: 0
BRUISES/BLEEDS EASILY: 0
PALPITATIONS: 0
CHILLS: 0
SHORTNESS OF BREATH: 0
VOMITING: 0
FEVER: 0
DEPRESSION: 0
LOSS OF SENSATION IN FEET: 0
NUMBNESS: 0
CONSTIPATION: 0

## 2025-05-22 ASSESSMENT — ACTIVITIES OF DAILY LIVING (ADL)
TAKING_MEDICATION: INDEPENDENT
DRESSING: INDEPENDENT
GROCERY_SHOPPING: INDEPENDENT
DOING_HOUSEWORK: INDEPENDENT
MANAGING_FINANCES: INDEPENDENT
BATHING: INDEPENDENT

## 2025-05-22 ASSESSMENT — PATIENT HEALTH QUESTIONNAIRE - PHQ9
1. LITTLE INTEREST OR PLEASURE IN DOING THINGS: NOT AT ALL
SUM OF ALL RESPONSES TO PHQ9 QUESTIONS 1 AND 2: 0
2. FEELING DOWN, DEPRESSED OR HOPELESS: NOT AT ALL

## 2025-05-23 NOTE — PROGRESS NOTES
Reason for Appointment  Chief Complaint   Patient presents with    Right Shoulder - Pain     History of Present Illness  Patient is a 74 y.o. male here today for follow-up evaluation of left shoulder 15 months out from a reverse shoulder arthroplasty overall doing well.  No falls or injuries no significant changes in his history does have the pain in the right shoulder.  Has had previous injections.  No significant changes in his history..     Medical History[1]    Surgical History[2]    Medication Documentation Review Audit       Reviewed by Josué Blanco MD (Physician) on 25 at 2337      Medication Order Taking? Sig Documenting Provider Last Dose Status   acetaminophen (Tylenol Extra Strength) 500 mg tablet [id number]  Take 2 tablets (1,000 mg) by mouth every 8 hours if needed for mild pain (1 - 3) for up to 10 days. Mee Rubalcava MD   25 235     Discontinued 25   Patient not taking:  Discontinued 25   fluocinolone (DermOtic) 0.01 % ear drops [id number] No Administer 5 drops into each ear once daily as needed (itching and flaking). Howard Wright MD Past Week Active   ibuprofen 800 mg tablet [id number]  Take 1 tablet (800 mg) by mouth every 8 hours if needed for mild pain (1 - 3) for up to 10 days. Mee Rubalcava MD   25 235   Discontinued 25 1134   meloxicam (Mobic) 15 mg tablet [id number]  TAKE 1 TABLET BY MOUTH EVERY DAY Howard Wright MD  Active   multivitamin-iron-minerals-folic acid (Centrum Silver) 0.4 mg-300 mcg- 250 mcg tablet [id number] No Centrum Silver Oral Tablet   Refills: 0        Start : 5-Aug-2020   Active Historical Provider, MD Past Week Active   omeprazole (PriLOSEC) 20 mg DR capsule [id number] No TAKE 1 CAPSULE BY MOUTH EVERY DAY Howard Wright MD 2025  5:00 AM Active     Discontinued 25   rosuvastatin (Crestor) 5 mg tablet [id number] No TAKE 1 TABLET BY MOUTH EVERY DAY Howard Wright MD 2025 Active                     RX Allergies[3]    Review of Systems   Constitutional:  Negative for chills, fatigue and fever.   Respiratory:  Negative for shortness of breath and wheezing.    Cardiovascular:  Negative for chest pain and leg swelling.   Allergic/Immunologic: Negative for immunocompromised state.   Neurological:  Negative for numbness.   Hematological:  Does not bruise/bleed easily.       Exam   Shoulder on the left shows excellent range of motion of 140 degrees incision looks excellent no acromial or scapular tenderness.  Good deltoid function good pulses good sensation both upper extremities  Assessment   Encounter Diagnosis   Name Primary?    Osteoarthritis of left shoulder, unspecified osteoarthritis type Yes       Plan   Doing well, we talked about follow-up on a yearly basis.  Any increased pain or new issues she should call immediately.                       [1]   Past Medical History:  Diagnosis Date    Arthritis 01 /01/2000    Cancer (Multi) 01/01/2000    Cochlear implant in place     L side / hearing aid worn    Encounter for screening for cardiovascular disorders 10/08/2019    Screening for abdominal aortic aneurysm    Encounter for screening for cardiovascular disorders 02/02/2021    Screening, heart disease, ischemic    GERD (gastroesophageal reflux disease)     Hyperlipidemia     Hypertension     Injury of conjunctiva and corneal abrasion without foreign body, left eye, initial encounter 04/16/2021    Abrasion of left cornea, initial encounter    Occipital neuralgia 09/12/2018    Occipital neuralgia of right side    Other chest pain 04/16/2021    Chest pain, atypical    Other conditions influencing health status     Adverse Reaction To Anesthesia    Other malaise 04/16/2021    Malaise and fatigue    Personal history of other diseases of the musculoskeletal system and connective tissue 04/16/2021    History of neck pain    Personal history of other diseases of the nervous system and sense organs     History of  hearing loss    Personal history of other specified conditions 06/05/2017    History of paresthesia    Personal history of other specified conditions 06/24/2020    History of weakness    PONV (postoperative nausea and vomiting)     Post laminectomy syndrome     Radiculopathy, lumbar region 09/10/2020    Lumbar radiculitis    Superficial foreign body of unspecified hand, initial encounter 04/16/2021    Acute foreign body of hand    Unspecified hearing loss, right ear 04/16/2021    Hearing loss of right ear, unspecified hearing loss type   [2]   Past Surgical History:  Procedure Laterality Date    ARTHROPLASTY Right 2010    Right thumb arthroplasty    ARTHROPLASTY Left 2011    Left thumb arthroplasty    BACK SURGERY  09/03/2015    CATARACT EXTRACTION Bilateral 2016    CERVICAL FUSION  2021    C3-C7    COCHLEAR IMPLANT  11/11/2022    COLONOSCOPY      2011, 2018    HAND SURGERY  07/08/2013    Hand Surgery                                                                                                                                                          HERNIA REPAIR  2009    Hernia Repair    JOINT REPLACEMENT  02/24/2022    KNEE ARTHROPLASTY Right 2022    KNEE SURGERY  2009    Knee Surgery Right    ROTATOR CUFF REPAIR Right     2012, 2013    SPINAL FUSION  2015    L4-S1 fusion   [3] No Known Allergies

## 2025-05-25 NOTE — PROGRESS NOTES
"History Of Present Illness:  Klever Moran \"Don\" is a 74 y.o. male with a history of bilateral progressive SNHL, patient is s/p left CI placement on 11/11/22, now s/p right CI placement on 5/9/25. He experienced 3 days of pain and a week of dizziness post operatively. Symptoms have resolved and he has no current complaints. No issues with post auricular incision.      Recall 4/1/25:  Klever Moran \"Don\" is a 74 y.o. male with a history of bilateral progressive SNHL presenting for right CI consultation. The patient is s/p left CI placement on 11/11/22.      The patient notes an intermittent echo sound with his left CI. Also reports an overall feeling of \"off balanced\" when he is using a step ladder. He states he does not have difficulty when he holds onto something. Denies difficulty when going up and down the stairs. Endorses a previous spinal surgery which could possibly be related.        Surgical History:  He has a past surgical history that includes Hernia repair (2009); Knee surgery (2009); Hand surgery (07/08/2013); Cochlear implant (11/11/2022); Arthroplasty (Right, 2010); Arthroplasty (Left, 2011); Colonoscopy; Rotator cuff repair (Right); Spinal fusion (2015); Cataract extraction (Bilateral, 2016); Cervical fusion (2021); Knee Arthroplasty (Right, 2022); Back surgery (09/03/2015); and Joint replacement (02/24/2022).     Allergies:  Patient has no known allergies.    Medications:   Current Outpatient Medications   Medication Instructions    fluocinolone (DermOtic) 0.01 % ear drops 5 drops, Each Ear, Daily PRN    meloxicam (MOBIC) 15 mg, oral, Daily    multivitamin-iron-minerals-folic acid (Centrum Silver) 0.4 mg-300 mcg- 250 mcg tablet Centrum Silver Oral Tablet   Refills: 0        Start : 5-Aug-2020   Active    omeprazole (PRILOSEC) 20 mg, oral, Daily    rosuvastatin (CRESTOR) 5 mg, oral, Daily      Review of Systems:   A comprehensive 10-point review of systems was obtained including " constitutional, neurological, HEENT, pulmonary, cardiovascular, genito-urinary, and other pertinent systems and was negative except as noted in the HPI.      Physical Exam:  Constitutional   General appearance: Healthy-appearing, well-nourished, well groomed, in no acute distress.   Ability to communicate: Normal communication without aids, normal voice quality.       Head and face: Atraumatic with no masses, lesions, or scarring.   Facial strength: Normal strength and symmetry, no synkinesis or facial tic.     Ears  Otoscopic examination: Left post auricular incision clean, dry and intact. No evidence of surrounding erythema or edema.     Nose: Dorsum symmetric with no visible or palpable deformities.     Oral Cavity/Mouth  Lips, teeth, and gums: Normal lips, gums, and dentition.     Oropharynx: Mucosa moist, no lesions.     Neck: Symmetrical, trachea midline. No masses visible.     Neurological/Psychiatric  Cranial Nerve Examination: II - XII grossly intact.  Orientation to person, place, and time: Normal.  Mood and affect: Normal.     Skin: Normal without rashes or lesions.     Pulmonary  Respiratory effort: Chest expands symmetrically.     Cardiovascular: Good peripheral pulses  Peripheral vascular system: No varicosities, carotid pulse normal, no edema. No jugular venous distension.     Extremities: Appearance of extremities: Normal. Gait normal.          Assessment/Plan   74 y.o. male with a history of bilateral progressive SNHL, patient is s/p left CI placement on 11/11/22, now s/p right CI placement on 5/9/25. He is doing well with no current concerns. He is able to return to normal daily activities without restrictions. He already has his activation appointment with audiology scheduled.    - Follow up in 3 months. If doing well, he will then proceed to yearly visits      Nik Gay DO PGY-3    I saw and evaluated the patient. I personally obtained the key and critical portions of the history and  physical exam or was physically present for key and critical portions performed by the resident/fellow. I reviewed the resident/fellow's documentation and discussed the patient with the resident/fellow. I agree with the resident/fellow's medical decision making as documented in the note.    Mee Rubalcava MD

## 2025-05-27 ENCOUNTER — APPOINTMENT (OUTPATIENT)
Dept: OTOLARYNGOLOGY | Facility: CLINIC | Age: 75
End: 2025-05-27
Payer: MEDICARE

## 2025-05-27 DIAGNOSIS — Z98.890 POSTOPERATIVE STATE: ICD-10-CM

## 2025-05-27 DIAGNOSIS — Z96.21 COCHLEAR IMPLANT STATUS: ICD-10-CM

## 2025-05-27 DIAGNOSIS — H90.3 SENSORINEURAL HEARING LOSS, BILATERAL: Primary | ICD-10-CM

## 2025-05-27 PROCEDURE — 1036F TOBACCO NON-USER: CPT | Performed by: OTOLARYNGOLOGY

## 2025-05-27 PROCEDURE — 99024 POSTOP FOLLOW-UP VISIT: CPT | Performed by: OTOLARYNGOLOGY

## 2025-05-27 PROCEDURE — 1160F RVW MEDS BY RX/DR IN RCRD: CPT | Performed by: OTOLARYNGOLOGY

## 2025-05-27 PROCEDURE — 1159F MED LIST DOCD IN RCRD: CPT | Performed by: OTOLARYNGOLOGY

## 2025-05-27 RX ORDER — CIPROFLOXACIN AND DEXAMETHASONE 3; 1 MG/ML; MG/ML
4 SUSPENSION/ DROPS AURICULAR (OTIC) DAILY
Qty: 7.5 ML | Refills: 2 | Status: SHIPPED | OUTPATIENT
Start: 2025-05-27 | End: 2025-05-27 | Stop reason: WASHOUT

## 2025-06-11 ENCOUNTER — APPOINTMENT (OUTPATIENT)
Dept: SPEECH THERAPY | Facility: CLINIC | Age: 75
End: 2025-06-11
Payer: MEDICARE

## 2025-06-12 ENCOUNTER — EVALUATION (OUTPATIENT)
Dept: SPEECH THERAPY | Facility: CLINIC | Age: 75
End: 2025-06-12
Payer: MEDICARE

## 2025-06-12 DIAGNOSIS — H90.3 SENSORINEURAL HEARING LOSS, BILATERAL: Primary | ICD-10-CM

## 2025-06-12 DIAGNOSIS — R48.8 OTHER SYMBOLIC DYSFUNCTIONS: ICD-10-CM

## 2025-06-12 PROBLEM — R41.841 COGNITIVE COMMUNICATION DEFICIT: Status: RESOLVED | Noted: 2025-04-10 | Resolved: 2025-06-12

## 2025-06-12 PROCEDURE — 92507 TX SP LANG VOICE COMM INDIV: CPT | Mod: GN

## 2025-06-12 PROCEDURE — 92523 SPEECH SOUND LANG COMPREHEN: CPT | Mod: GN

## 2025-06-12 ASSESSMENT — PAIN - FUNCTIONAL ASSESSMENT: PAIN_FUNCTIONAL_ASSESSMENT: 0-10

## 2025-06-12 ASSESSMENT — PAIN SCALES - GENERAL: PAINLEVEL_OUTOF10: 0 - NO PAIN

## 2025-06-12 NOTE — PROGRESS NOTES
"Speech-Language Pathology    Auditory Evaluation and Treatment      Patient Name: Klever Moran \"Jose\"  MRN: [id number]  Today's Date: 6/12/2025     Time Calculation  Start Time: 1305  Stop Time: 1400  Time Calculation (min): 55 min  Time in evaluation: 33 min  Time in treatment: 22 min    Current Problem:  Problem List[1]     Impressions and Recommendations:   Patient with moderate auditory skill deficits with new cochlear implant characterized by challenges with listening in noise.  Patient requires continued skilled intervention that is medically necessary and recommended by a physician to increase listening skills to promote overall communication.  Without this medically necessary service, the patient will not be able to achieve best outcomes with their surgical device (cochlear implant) and is anticipated to have negative impact on their involvement in the work place, community activities, and home communication.    Recommendations: Communication Therapy, Home Program    Long Term Goal: Comprehend sentences in competing noise with 80% accuracy in 6 months  Establish Date: 06/12/25    Comprehend conversation in competing noise with 80% accuracy in 6 months  Establish Date: 06/12/25     Short Term Goals:  Goal: Demonstrate assistive technology and home program therapy applications as evidence by personal report in 3 months.  Establish Date: 06/12/25  Status: Initiated  Progress: Provided education re: Hearing Success portal, [employer] torrey, [facility] conversation practice website, and audiobooks.     Goal: Comprehend 1-2 paragraphs in quiet with no visual cues by recalling the story or answering WH questions with 80% accuracy in 6 months.   Establish Date: 06/12/25  Status: Initiated  Progress: Not targeted    Goal: Comprehend words in a known category in noise of increasing complexity with no visual cues with 80% accuracy in 3 months.  Establish Date: 06/12/25  Status: Initiated  Progress: Not targeted     Goal: " Comprehend sentences about a picture in competing noise with no visual cues with 80% accuracy in 3 months.  Establish Date: 06/12/25  Status: Initiated  Progress: 50% accuracy     Goal: Comprehend sentences about a known topic in competing noise with no visual cues with 80% accuracy in 3 months.  Establish Date: 06/12/25  Status: Initiated  Progress: Not targeted     Goal: Comprehend sentences in competing noise with no visual cues with 80% accuracy in 6 months.   Establish Date: 06/12/25  Status: Initiated  Progress: Not targeted    Resonance-Voice Assessment:  Assessments Used: Informal  Articulation Screening: Age appropriate  Nasal Resonance: Normal  Nasal Air Emissions: Not present  Voice: Normal  Speech Inteligibility: 100%    Auditory Plan of Care:  Recommend Treatment: Yes  Frequency: Other: (comment) (Every other week)  Duration: 6 months  Prognosis: Excellent  Factors Affecting Prognosis: None  Discussed POC: Patient  The patient's family/caregiver was educated regarding appropriate motivation and expectations for a cochlear implant (CI) and the CI process.: Yes      Subjective   Current Problem: Pt presents for aural rehabilitation evaluation. Pt reported home practice through use of [employer] torrey for right sided cochlear implant.     General Visit Information:  Recommendations: Communication Therapy, Home Program  Living Environment: Home  Language at home: Spoken English   Present: No  Arrival: Independent  Reason for Referral: Aural rehabilitation s/p cochlear implantation  Certification Period Start Date: 06/12/25  Certification Period End Date: 09/12/25    Supervising clinician was present and made all clinical decisions.    Provider:  Audiology: Dr. Hawkins with [facility]  ENT: Dr. Rubalcava    Pain:  Pain Assessment  Pain Assessment: 0-10  0-10 (Numeric) Pain Score: 0 - No pain    Objective     Baseline Observations:  Hearing Loss: Progressive (Over time)  Current Amplification: Worn during  evaluation  Right Ear: Cochlear Implant  Date of amplification right: 5/28/2025  Amplification Worn During Evaluation: Yes  Hours Worn: Whenever awake    Cochlear [facility] Manual Screening & Exercise:  Sentence Length Identification : 100%  Phrase Length Identification : 90%  Paragraph Tracking : 70%    Common Phrase Test:  Common Phrase Test (CPT)  In Quiet : 80%  In Environmental Noise : 40%  In Competing Speech Noise : 70%       CAST - Recognition of:  Level 1: Suprasegmental Features : 20  Level 1: Presentations Correct : 100%  Level 2: Phonemically Dissimilar Words : 20  Level 2: Presentations Correct : 100%  Level 3: Vowels : 20  Level 3: Presentations Correct : 100%  Level 4: Consonant Manner Features : 20  Level 4: Presentations Correct : 100%  Level 5: Consonant Voicing Features : 16  Level 5: Presentations Correct : 80%  Level 6: Consonant Place Features : 17  Level 6: Presentations Correct : 85%  Level 7: Final Consonant Differences : 18  Level 7: Presentations Correct : 90%       Auditory Education:  Treatment Performed Today: Yes  Individual(s) Educated: Patient  Verbal Education Provided: Risks/Benefits of Therapy, Results of Testing, Technology Assistance  Written Education Provided: Exercises (Hearing Success Portal, Word Success torrey, audiobooks, ESL conversation practice website)  Response to Educaton: Verbalized Understanding, Patient/caregiver asked appropriate questions  Patient's Learning Preference(s): Demonstration, Printed Materials, Explanation/Discussion  Patient/caregiver verbalized understanding and agreement.: Yes                    [1]   Patient Active Problem List  Diagnosis    B12 deficiency    Cervical radiculitis    Cervical stenosis of spine    Chest pain, pleuritic    Pain in joint, lower leg    Combined hyperlipidemia    Degenerative arthritis of lumbar spine    Disc degeneration, lumbar    GERD (gastroesophageal reflux disease)    Hearing loss    Hyperhidrosis    Impaired  fasting glucose    Impingement syndrome of left shoulder    Left shoulder pain    Low back pain    Lumbar postlaminectomy syndrome    Lung mass    Myofascial pain syndrome, cervical    Osteoarthritis of wrist    Osteoarthritis, hand    Sensorineural hearing loss, bilateral    Spondylosis of cervical region without myelopathy or radiculopathy    Status post lumbar spinal fusion    Tinnitus of both ears    Weakness    Bilateral chronic knee pain    Cochlear implant status    Health maintenance examination    History of colon polyps    Primary osteoarthritis, left shoulder    Squamous cell carcinoma of skin of other parts of face    Squamous cell carcinoma of skin of scalp and neck    Lumbar stenosis with neurogenic claudication    Ulceration of intestine    Primary hypertension    Postoperative state    Paresthesia    Impingement syndrome of shoulder region    History of hearing loss    History of metabolic disorder    Acquired spondylolisthesis    PONV (postoperative nausea and vomiting)    Shoulder stiffness, left    Pain and swelling of left shoulder    Retention of urine    Drug-induced constipation    Stiffness of cervical spine    Tingling pain    Other symbolic dysfunctions    Anxiety

## 2025-06-26 ENCOUNTER — TREATMENT (OUTPATIENT)
Dept: SPEECH THERAPY | Facility: CLINIC | Age: 75
End: 2025-06-26
Payer: MEDICARE

## 2025-06-26 DIAGNOSIS — H90.3 SENSORINEURAL HEARING LOSS, BILATERAL: Primary | ICD-10-CM

## 2025-06-26 DIAGNOSIS — R48.8 OTHER SYMBOLIC DYSFUNCTIONS: ICD-10-CM

## 2025-06-26 PROCEDURE — 92507 TX SP LANG VOICE COMM INDIV: CPT | Mod: GN

## 2025-06-26 ASSESSMENT — PAIN SCALES - GENERAL: PAINLEVEL_OUTOF10: 0 - NO PAIN

## 2025-06-26 ASSESSMENT — PAIN - FUNCTIONAL ASSESSMENT: PAIN_FUNCTIONAL_ASSESSMENT: 0-10

## 2025-06-26 NOTE — PROGRESS NOTES
"Speech-Language Pathology    SLP Adult Outpatient Speech-Language Pathology Treatment     Patient Name: Klever Moran \"Jose\"  MRN: [id number]  Today's Date: 6/26/2025     Time Calculation  Start Time: 0905  Stop Time: 0955  Time Calculation (min): 50 min      Current Problem:   1. Sensorineural hearing loss, bilateral        2. Other symbolic dysfunctions             Plan:  SLP TX Plan: Continue Plan of Care  SLP Frequency: Other (Comment) (Pt instructed to scheudule if concerns arise)  Duration: 6 months  Discussed POC: Patient  Discussed Risks/Benefits: Yes  Patient/Caregiver Agreeable: Yes      Subjective   Pt reported continued home practice using the [employer] torrey. Pt reported that speech is more clear since his second mapping appointment.     General Visit Information:  Arrival: Independent  Certification Period Start Date: 06/12/25  Certification Period End Date: 09/12/25  Total Number of Visits : 2  Supervising clinician was present and made all clinical decisions.      Pain Assessment:  Pain Assessment  Pain Assessment: 0-10  0-10 (Numeric) Pain Score: 0 - No pain      Objective    Long Term Goal: Comprehend sentences in competing noise with 80% accuracy in 6 months  Establish Date: 06/12/25     Comprehend conversation in competing noise with 80% accuracy in 6 months  Establish Date: 06/12/25     Short Term Goals:  Goal: Demonstrate assistive technology and home program therapy applications as evidence by personal report in 3 months.  Establish Date: 06/12/25  Status: Progressing  Progress: Provided education re: [employer], Cochlear support contact information, and Cochlear's Telephone with Confidence.     Goal: Comprehend 1-2 paragraphs in quiet with no visual cues by recalling the story or answering WH questions with 80% accuracy in 6 months.   Establish Date: 06/12/25  Status: GOAL MET   Progress: 100% accuracy (answered 5/5 comprehension questions)     Goal: Comprehend words in a known category in " noise of increasing complexity with no visual cues with 80% accuracy in 3 months.  Establish Date: 06/12/25  Status: Progressing  Progress: Environmental noise: 60% accuracy; increasing to 80% accuracy with repetition; increasing to 90% accuracy with known linguistic message  Conversational noise: 40% accuracy; increasing to 50% with repetition; increasing to 70% with known linguistic message     Goal: Comprehend sentences about a picture in competing noise with no visual cues with 80% accuracy in 3 months.  Establish Date: 06/12/25  Status: Progressing  Progress: 60% accuracy; increasing to 90% with repetition      Goal: Comprehend sentences about a known topic in competing noise with no visual cues with 80% accuracy in 3 months.  Establish Date: 06/12/25  Status: Progressing  Progress: 60% accuracy; increasing to 90% with repetition     Goal: Comprehend sentences in competing noise with no visual cues with 80% accuracy in 6 months.   Establish Date: 06/12/25  Status: Progressing  Progress: 70% accuracy; increasing to 100% with repetition       Outpatient Education:  Adult Outpatient Education  Individual(s) Educated: Patient  Written Education : [employer]ks, Cochlear support contact information, Cochlear's Telephone with [employer]idence  Risk and Benefits Discussed with Patient/Caregiver/Other: yes  Patient/Caregiver Demonstrated Understanding: yes  Plan of Care Discussed and Agreed Upon: yes  Patient Response to Education: Patient/Caregiver Verbalized Understanding of Information, Patient/Caregiver Asked Appropriate Questions

## 2025-08-07 ENCOUNTER — PREP FOR PROCEDURE (OUTPATIENT)
Dept: ORTHOPEDIC SURGERY | Facility: HOSPITAL | Age: 75
End: 2025-08-07

## 2025-08-07 ENCOUNTER — OFFICE VISIT (OUTPATIENT)
Dept: ORTHOPEDIC SURGERY | Facility: HOSPITAL | Age: 75
End: 2025-08-07
Payer: MEDICARE

## 2025-08-07 ENCOUNTER — HOSPITAL ENCOUNTER (OUTPATIENT)
Dept: RADIOLOGY | Facility: HOSPITAL | Age: 75
Discharge: HOME | End: 2025-08-07
Payer: MEDICARE

## 2025-08-07 DIAGNOSIS — M19.011 OSTEOARTHRITIS OF RIGHT SHOULDER, UNSPECIFIED OSTEOARTHRITIS TYPE: Primary | ICD-10-CM

## 2025-08-07 DIAGNOSIS — Z01.818 PREOP EXAMINATION: ICD-10-CM

## 2025-08-07 DIAGNOSIS — M19.011 OSTEOARTHRITIS OF RIGHT SHOULDER, UNSPECIFIED OSTEOARTHRITIS TYPE: ICD-10-CM

## 2025-08-07 DIAGNOSIS — M25.511 ACUTE PAIN OF RIGHT SHOULDER: ICD-10-CM

## 2025-08-07 DIAGNOSIS — M25.511 ACUTE PAIN OF RIGHT SHOULDER: Primary | ICD-10-CM

## 2025-08-07 PROCEDURE — 1160F RVW MEDS BY RX/DR IN RCRD: CPT | Performed by: ORTHOPAEDIC SURGERY

## 2025-08-07 PROCEDURE — L3670 SO ACRO/CLAV CAN WEB PRE OTS: HCPCS | Performed by: ORTHOPAEDIC SURGERY

## 2025-08-07 PROCEDURE — 73200 CT UPPER EXTREMITY W/O DYE: CPT | Mod: RT

## 2025-08-07 PROCEDURE — 1159F MED LIST DOCD IN RCRD: CPT | Performed by: ORTHOPAEDIC SURGERY

## 2025-08-07 PROCEDURE — 99213 OFFICE O/P EST LOW 20 MIN: CPT | Performed by: ORTHOPAEDIC SURGERY

## 2025-08-07 PROCEDURE — 1125F AMNT PAIN NOTED PAIN PRSNT: CPT | Performed by: ORTHOPAEDIC SURGERY

## 2025-08-07 RX ORDER — CEFAZOLIN SODIUM 2 G/100ML
2 INJECTION, SOLUTION INTRAVENOUS ONCE
OUTPATIENT
Start: 2025-08-07 | End: 2025-08-07

## 2025-08-07 ASSESSMENT — ENCOUNTER SYMPTOMS
NUMBNESS: 1
BRUISES/BLEEDS EASILY: 0
WHEEZING: 0
FEVER: 0
SHORTNESS OF BREATH: 0
FATIGUE: 0
CHILLS: 0

## 2025-08-07 ASSESSMENT — PAIN SCALES - GENERAL: PAINLEVEL_OUTOF10: 4

## 2025-08-07 ASSESSMENT — PAIN - FUNCTIONAL ASSESSMENT: PAIN_FUNCTIONAL_ASSESSMENT: 0-10

## 2025-08-07 NOTE — PROGRESS NOTES
Reason for Appointment  Chief Complaint   Patient presents with    Right Shoulder - Pain     History of Present Illness  Patient is a 75 y.o. male here today for follow-up evaluation of right shoulder significant pain, he only had 4 weeks of improvement from the right shoulder subacromial injection x-rays from last November show rotator cuff arthritis with previous anchors in place.  Difficult to sleep difficult to do sustained activities doing excellent from his left shoulder replacement and wants to go ahead with reverse shoulder replacement on the right and he has failed all conservative measures and has had 2 previous surgeries on that shoulder.  No cervical radicular findings.  Full history reviewed.     Medical History[1]    Surgical History[2]    Medication Documentation Review Audit       Reviewed by Josué Blanco MD (Physician) on 08/07/25 at 0803      Medication Order Taking? Sig Documenting Provider Last Dose Status   fluocinolone (DermOtic) 0.01 % ear drops [id number] Yes Administer 5 drops into each ear once daily as needed (itching and flaking). Howard Wright MD Past Week Active   meloxicam (Mobic) 15 mg tablet [id number] Yes TAKE 1 TABLET BY MOUTH EVERY DAY Howard Wright MD  Active   multivitamin-iron-minerals-folic acid (Centrum Silver) 0.4 mg-300 mcg- 250 mcg tablet [id number] Yes Centrum Silver Oral Tablet   Refills: 0        Start : 5-Aug-2020   Active Historical Provider, MD Past Week Active   omeprazole (PriLOSEC) 20 mg DR capsule [id number] Yes TAKE 1 CAPSULE BY MOUTH EVERY DAY Howard Wright MD 5/9/2025  5:00 AM Active   rosuvastatin (Crestor) 5 mg tablet [id number] Yes TAKE 1 TABLET BY MOUTH EVERY DAY Howard Wright MD 5/8/2025 Active                    RX Allergies[3]    Review of Systems   Constitutional:  Negative for chills, fatigue and fever.   Respiratory:  Negative for shortness of breath and wheezing.    Cardiovascular:  Negative for chest pain and leg swelling.    Allergic/Immunologic: Negative for immunocompromised state.   Neurological:  Positive for numbness.   Hematological:  Does not bruise/bleed easily.       Exam   Exam reveals well-healed incisions over right shoulder good deltoid function but extremely weak in the right rotator cuff musculature difficult to get the arm above 120 degrees with pain no joint effusion good pulses good sensation distally  Assessment   Right shoulder rotator cuff arthritis    Plan   Wrist benefits alternatives and recovery explained at length wished to proceed.  Has been through this on the left side we will get a preoperative CT scan and proceed with scheduling right shoulder reverse replacement                       [1]   Past Medical History:  Diagnosis Date    Arthritis 01 /01/2000    Cancer (Multi) 01/01/2000    Cochlear implant in place     L side / hearing aid worn    Encounter for screening for cardiovascular disorders 10/08/2019    Screening for abdominal aortic aneurysm    Encounter for screening for cardiovascular disorders 02/02/2021    Screening, heart disease, ischemic    GERD (gastroesophageal reflux disease)     Hyperlipidemia     Hypertension     Injury of conjunctiva and corneal abrasion without foreign body, left eye, initial encounter 04/16/2021    Abrasion of left cornea, initial encounter    Occipital neuralgia 09/12/2018    Occipital neuralgia of right side    Other chest pain 04/16/2021    Chest pain, atypical    Other conditions influencing health status     Adverse Reaction To Anesthesia    Other malaise 04/16/2021    Malaise and fatigue    Personal history of other diseases of the musculoskeletal system and connective tissue 04/16/2021    History of neck pain    Personal history of other diseases of the nervous system and sense organs     History of hearing loss    Personal history of other specified conditions 06/05/2017    History of paresthesia    Personal history of other specified conditions 06/24/2020     History of weakness    PONV (postoperative nausea and vomiting)     Post laminectomy syndrome     Radiculopathy, lumbar region 09/10/2020    Lumbar radiculitis    Superficial foreign body of unspecified hand, initial encounter 04/16/2021    Acute foreign body of hand    Unspecified hearing loss, right ear 04/16/2021    Hearing loss of right ear, unspecified hearing loss type   [2]   Past Surgical History:  Procedure Laterality Date    ARTHROPLASTY Right 2010    Right thumb arthroplasty    ARTHROPLASTY Left 2011    Left thumb arthroplasty    BACK SURGERY  09/03/2015    CATARACT EXTRACTION Bilateral 2016    CERVICAL FUSION  2021    C3-C7    COCHLEAR IMPLANT  11/11/2022    COLONOSCOPY      2011, 2018    HAND SURGERY  07/08/2013    Hand Surgery                                                                                                                                                          HERNIA REPAIR  2009    Hernia Repair    JOINT REPLACEMENT  02/24/2022    KNEE ARTHROPLASTY Right 2022    KNEE SURGERY  2009    Knee Surgery Right    ROTATOR CUFF REPAIR Right     2012, 2013    SPINAL FUSION  2015    L4-S1 fusion   [3] No Known Allergies

## 2025-08-26 ENCOUNTER — APPOINTMENT (OUTPATIENT)
Dept: OTOLARYNGOLOGY | Facility: CLINIC | Age: 75
End: 2025-08-26
Payer: MEDICARE

## 2025-09-22 ENCOUNTER — APPOINTMENT (OUTPATIENT)
Dept: RADIOLOGY | Facility: HOSPITAL | Age: 75
End: 2025-09-22
Payer: MEDICARE

## 2025-09-23 ENCOUNTER — APPOINTMENT (OUTPATIENT)
Dept: OTOLARYNGOLOGY | Facility: CLINIC | Age: 75
End: 2025-09-23
Payer: MEDICARE

## 2025-09-24 ENCOUNTER — APPOINTMENT (OUTPATIENT)
Dept: DERMATOLOGY | Facility: CLINIC | Age: 75
End: 2025-09-24
Payer: MEDICARE

## 2025-10-06 ENCOUNTER — APPOINTMENT (OUTPATIENT)
Dept: PRIMARY CARE | Facility: CLINIC | Age: 75
End: 2025-10-06
Payer: MEDICARE

## 2025-12-02 ENCOUNTER — APPOINTMENT (OUTPATIENT)
Dept: PRIMARY CARE | Facility: CLINIC | Age: 75
End: 2025-12-02
Payer: MEDICARE

## (undated) DEVICE — BLADE, SAW, SAGITTAL, 25.0 X 1.27 X 90MM

## (undated) DEVICE — 12MM MEDTRONIC PAIRED SUBDERMAL ELECTRODE, 2-CHANNEL, 12.0MM***DUPLICATE, PLEASE TRANSITION TO CAT #8227410

## (undated) DEVICE — ELECTRODE, PAIRED SUBDERMAL OTO

## (undated) DEVICE — Device

## (undated) DEVICE — MASK, FACE, TENET, FOAM POSITIONING, DISPOSABLE

## (undated) DEVICE — COVER, MICROSCOPE, ZEISS, 48X82, OPMI, STERILE

## (undated) DEVICE — STRIP, SKIN CLOSURE, STERI STRIP, REINFORCED, 0.5 X 4 IN

## (undated) DEVICE — TUBING, SUCTION, OTOMED

## (undated) DEVICE — CLEANER, WIPE, INSTRUMENT, 3.25 X 3.25 IN

## (undated) DEVICE — PAD, GROUNDING, ELECTROSURGICAL, W/9 FT CABLE, POLYHESIVE II, ADULT, LF

## (undated) DEVICE — DRESSING, TRANSPARENT, TEGADERM, 2-3/8 X 2-3/4 IN

## (undated) DEVICE — CATHETER, IV, ANGIOCATH, 20 G X 1.88 IN, FEP POLYMER

## (undated) DEVICE — GOWN, SURGICAL, SIRUS, NON REINFORCED, LARGE

## (undated) DEVICE — COMB, HAIR, 7 IN, PLASTIC, BLACK

## (undated) DEVICE — SUTURE, SILK, 2-0, 30 IN, RB-1, BLACK

## (undated) DEVICE — DRAPE, TOWEL, STERI DRAPE, 17 X 11 IN, PLASTIC, STERILE

## (undated) DEVICE — SUTURE, VICRYL, 3-0,18 IN, SH, UNDYED

## (undated) DEVICE — CONTAINER, SPECIMEN, 4 OZ, OR PEEL PACK, STERILE

## (undated) DEVICE — BUR, BALL 0.5MM  DIAMOND EXTENDED LENGTH

## (undated) DEVICE — TIP,  ELECTRODE COATED INSULATED, EXTENDED, LF

## (undated) DEVICE — SUTURE, ETHIBOND, P2, V-37, 30 IN, GREEN

## (undated) DEVICE — DRESSING, GAUZE, 16 PLY, 4 X 4 IN, STERILE

## (undated) DEVICE — TOWEL, SURGICAL, NEURO, O/R, 16 X 26, BLUE, STERILE

## (undated) DEVICE — DRAPE, INSTRUMENT, W/POUCH, STERI DRAPE, 7 X 11 IN, DISPOSABLE, STERILE

## (undated) DEVICE — BALL, FLUTED, 6 MM

## (undated) DEVICE — PREP TRAY, SKIN, DRY, W/GLOVES

## (undated) DEVICE — HOOD, SURGICAL, FLYTE HYBRID

## (undated) DEVICE — DRAPE, SURGICAL, OTOLOGY GLASSCOCK

## (undated) DEVICE — BLANKET, LOWER BODY, VHA PLUS, ADULT

## (undated) DEVICE — SYRINGE, MONOJECT, LUER LOCK, 3 CC, LF

## (undated) DEVICE — 3.0MM ARTHREX MGS DRILL BIT, STERILE

## (undated) DEVICE — STOCKINETTE, IMPERVIOUS, 9 X 48 IN, STERILE

## (undated) DEVICE — SYRINGE, 60 CC, IRRIGATION, BULB, CONTRO-BULB, PAPER POUCH

## (undated) DEVICE — DRAPE, SHEET, 17 X 23 IN

## (undated) DEVICE — BUR, 1.5MM DIAMOND EXT

## (undated) DEVICE — SYRINGE, 1 CC, LUER LOCK

## (undated) DEVICE — CAUTERY, PENCIL, PUSH BUTTON, SMOKE EVAC, 70MM

## (undated) DEVICE — TAPE, SILK, DURAPORE, 3 IN X 10 YD, LF

## (undated) DEVICE — BUR, 2MM DIAMOND BALL STD

## (undated) DEVICE — GLOVE, SURGICAL, PROTEXIS PI , 7.5, PF, LF

## (undated) DEVICE — BALL, FLUTED, 4 MM

## (undated) DEVICE — GLOVE, SURGICAL, PROTEXIS PI , 6.5, PF, LF

## (undated) DEVICE — DRAPE, SHEET, U, STERI DRAPE, 47 X 51 IN, DISPOSABLE, STERILE

## (undated) DEVICE — DRESSING, EAR, GLASSCOCK, ADULT

## (undated) DEVICE — SUTURE, MONOCRYL, 3-0, 27 IN, PS-2, UNDYED

## (undated) DEVICE — PROBE COVER, ULTRASOUND, 6 X 96, GEL PACKET

## (undated) DEVICE — SOLUTION, IRRIGATION, X RX SODIUM CHL 0.9%, 1000ML BTL

## (undated) DEVICE — SUTURE, PLAIN, 5-0, 18 IN, PC1, YELLOW

## (undated) DEVICE — NEEDLE, HYPODERMIC, NEEDLE PRO, 25G X 1.5, ORANGE

## (undated) DEVICE — NEEDLE, HYPODERMIC, MONOJECT, 27 G X 1.5 IN